# Patient Record
Sex: MALE | Race: WHITE | NOT HISPANIC OR LATINO | Employment: OTHER | URBAN - METROPOLITAN AREA
[De-identification: names, ages, dates, MRNs, and addresses within clinical notes are randomized per-mention and may not be internally consistent; named-entity substitution may affect disease eponyms.]

---

## 2017-01-10 RX ORDER — AMLODIPINE BESYLATE 10 MG/1
TABLET ORAL
Qty: 90 TABLET | Refills: 1 | Status: SHIPPED | OUTPATIENT
Start: 2017-01-10 | End: 2017-05-30 | Stop reason: SDUPTHER

## 2017-01-24 RX ORDER — VALSARTAN 320 MG/1
TABLET ORAL
Qty: 90 TABLET | Refills: 1 | Status: SHIPPED | OUTPATIENT
Start: 2017-01-24 | End: 2017-06-13 | Stop reason: SDUPTHER

## 2017-02-13 ENCOUNTER — LAB VISIT (OUTPATIENT)
Dept: LAB | Facility: HOSPITAL | Age: 77
End: 2017-02-13
Attending: FAMILY MEDICINE
Payer: MEDICARE

## 2017-02-13 DIAGNOSIS — E78.5 HYPERLIPIDEMIA LDL GOAL <100: ICD-10-CM

## 2017-02-13 DIAGNOSIS — I10 ESSENTIAL HYPERTENSION: ICD-10-CM

## 2017-02-13 DIAGNOSIS — E11.49 TYPE 2 DIABETES MELLITUS WITH NEUROLOGICAL MANIFESTATIONS: ICD-10-CM

## 2017-02-13 LAB
ALBUMIN SERPL BCP-MCNC: 4.1 G/DL
ALP SERPL-CCNC: 60 U/L
ALT SERPL W/O P-5'-P-CCNC: 15 U/L
ANION GAP SERPL CALC-SCNC: 10 MMOL/L
AST SERPL-CCNC: 18 U/L
BILIRUB SERPL-MCNC: 0.9 MG/DL
BUN SERPL-MCNC: 19 MG/DL
CALCIUM SERPL-MCNC: 9.6 MG/DL
CHLORIDE SERPL-SCNC: 105 MMOL/L
CO2 SERPL-SCNC: 24 MMOL/L
CREAT SERPL-MCNC: 1.1 MG/DL
EST. GFR  (AFRICAN AMERICAN): >60 ML/MIN/1.73 M^2
EST. GFR  (NON AFRICAN AMERICAN): >60 ML/MIN/1.73 M^2
GLUCOSE SERPL-MCNC: 88 MG/DL
POTASSIUM SERPL-SCNC: 4.4 MMOL/L
PROT SERPL-MCNC: 6.9 G/DL
SODIUM SERPL-SCNC: 139 MMOL/L

## 2017-02-13 PROCEDURE — 36415 COLL VENOUS BLD VENIPUNCTURE: CPT | Mod: PO

## 2017-02-13 PROCEDURE — 80053 COMPREHEN METABOLIC PANEL: CPT

## 2017-02-13 PROCEDURE — 83036 HEMOGLOBIN GLYCOSYLATED A1C: CPT

## 2017-02-14 LAB
ESTIMATED AVG GLUCOSE: 114 MG/DL
HBA1C MFR BLD HPLC: 5.6 %

## 2017-02-16 ENCOUNTER — OFFICE VISIT (OUTPATIENT)
Dept: FAMILY MEDICINE | Facility: CLINIC | Age: 77
End: 2017-02-16
Payer: MEDICARE

## 2017-02-16 VITALS
BODY MASS INDEX: 23.51 KG/M2 | SYSTOLIC BLOOD PRESSURE: 132 MMHG | OXYGEN SATURATION: 96 % | HEART RATE: 52 BPM | HEIGHT: 74 IN | RESPIRATION RATE: 18 BRPM | WEIGHT: 183.19 LBS | DIASTOLIC BLOOD PRESSURE: 64 MMHG

## 2017-02-16 DIAGNOSIS — M10.9 GOUT OF FOOT, UNSPECIFIED CAUSE, UNSPECIFIED CHRONICITY, UNSPECIFIED LATERALITY: ICD-10-CM

## 2017-02-16 DIAGNOSIS — E78.5 HYPERLIPIDEMIA LDL GOAL <100: ICD-10-CM

## 2017-02-16 DIAGNOSIS — E11.49 TYPE 2 DIABETES MELLITUS WITH NEUROLOGICAL MANIFESTATIONS: Primary | ICD-10-CM

## 2017-02-16 DIAGNOSIS — I10 ESSENTIAL HYPERTENSION: ICD-10-CM

## 2017-02-16 DIAGNOSIS — K21.9 GASTROESOPHAGEAL REFLUX DISEASE, ESOPHAGITIS PRESENCE NOT SPECIFIED: ICD-10-CM

## 2017-02-16 DIAGNOSIS — Z12.5 ENCOUNTER FOR SCREENING FOR MALIGNANT NEOPLASM OF PROSTATE: ICD-10-CM

## 2017-02-16 PROCEDURE — 1159F MED LIST DOCD IN RCRD: CPT | Mod: S$GLB,,, | Performed by: FAMILY MEDICINE

## 2017-02-16 PROCEDURE — 4010F ACE/ARB THERAPY RXD/TAKEN: CPT | Mod: S$GLB,,, | Performed by: FAMILY MEDICINE

## 2017-02-16 PROCEDURE — 3044F HG A1C LEVEL LT 7.0%: CPT | Mod: S$GLB,,, | Performed by: FAMILY MEDICINE

## 2017-02-16 PROCEDURE — 99499 UNLISTED E&M SERVICE: CPT | Mod: S$GLB,,, | Performed by: FAMILY MEDICINE

## 2017-02-16 PROCEDURE — 1126F AMNT PAIN NOTED NONE PRSNT: CPT | Mod: S$GLB,,, | Performed by: FAMILY MEDICINE

## 2017-02-16 PROCEDURE — 99999 PR PBB SHADOW E&M-EST. PATIENT-LVL III: CPT | Mod: PBBFAC,,, | Performed by: FAMILY MEDICINE

## 2017-02-16 PROCEDURE — 1157F ADVNC CARE PLAN IN RCRD: CPT | Mod: S$GLB,,, | Performed by: FAMILY MEDICINE

## 2017-02-16 PROCEDURE — 3075F SYST BP GE 130 - 139MM HG: CPT | Mod: S$GLB,,, | Performed by: FAMILY MEDICINE

## 2017-02-16 PROCEDURE — 99214 OFFICE O/P EST MOD 30 MIN: CPT | Mod: S$GLB,,, | Performed by: FAMILY MEDICINE

## 2017-02-16 PROCEDURE — 3078F DIAST BP <80 MM HG: CPT | Mod: S$GLB,,, | Performed by: FAMILY MEDICINE

## 2017-02-16 PROCEDURE — 1160F RVW MEDS BY RX/DR IN RCRD: CPT | Mod: S$GLB,,, | Performed by: FAMILY MEDICINE

## 2017-02-16 RX ORDER — METFORMIN HYDROCHLORIDE 500 MG/1
500 TABLET ORAL 2 TIMES DAILY WITH MEALS
Qty: 180 TABLET | Refills: 3
Start: 2017-02-16 | End: 2017-08-28 | Stop reason: SDUPTHER

## 2017-02-16 NOTE — MR AVS SNAPSHOT
[facility]  [address] [facility]  [place] [address]  Phone: [phone]  Fax: [phone]                  Carolann Waite   2017 9:00 AM   Office Visit    Description:  Male : 1940   Provider:  LENNY Wells MD   Department:  [facility]           Reason for Visit     Follow-up           Diagnoses this Visit        Comments    Type 2 diabetes mellitus with neurological manifestations    -  Primary     Essential hypertension         Hyperlipidemia LDL goal <100         Gastroesophageal reflux disease, esophagitis presence not specified         Gout of foot, unspecified cause, unspecified chronicity, unspecified laterality         Encounter for screening for malignant neoplasm of prostate                To Do List           Future Appointments        Provider Department Dept Phone    2017 8:00 AM LAB, [place] [facility] [phone]    2017 10:00 AM LAB, [place] [facility] [phone]    2017 9:00 AM LENNY Wells MD [facility] [phone]      Goals (5 Years of Data)     None      Follow-Up and Disposition     Return in about 3 months (around 2017), or if symptoms worsen or fail to improve.    Follow-up and Disposition History       These Medications        Disp Refills Start End    metformin (GLUCOPHAGE) 500 MG tablet 180 tablet 3 2017     Take 1 tablet (500 mg total) by mouth 2 (two) times daily with meals. - Oral    Pharmacy: [facility] - [address] #: [phone]         [facility]     [facility] Nurse Care Line -  Assistance  Registered nurses in the [facility] provide clinical advisement, health education, appointment booking, and other advisory services.  Call for this free service at [phone].             Medications           Message regarding Medications     Verify the  changes and/or additions to your medication regime listed below are the same as discussed with your clinician today.  If any of these changes or additions are incorrect, please notify your healthcare provider.        CHANGE how you are taking these medications     Start Taking Instead of    metformin (GLUCOPHAGE) 500 MG tablet metformin (GLUCOPHAGE) 500 MG tablet    Dosage:  Take 1 tablet (500 mg total) by mouth 2 (two) times daily with meals. Dosage:  1000 mg qam and 500 mg qpm    Reason for Change:  Reorder            Verify that the below list of medications is an accurate representation of the medications you are currently taking.  If none reported, the list may be blank. If incorrect, please contact your healthcare provider. Carry this list with you in case of emergency.           Current Medications     ACCU-CHEK MILENA PLUS METER Misc USE AS DIRECTED    ACCU-CHEK MILENA PLUS TEST STRP Strp USE TO CHECK BLOOD SUGAR DAILY    acyclovir (ZOVIRAX) 400 MG tablet     amlodipine (NORVASC) 10 MG tablet TAKE 1 TABLET EVERY DAY    atorvastatin (LIPITOR) 20 MG tablet TAKE 1 TABLET ONE TIME DAILY    etodolac (LODINE) 200 MG Cap Take 1 capsule (200 mg total) by mouth every 8 (eight) hours as needed. To use for gout attack related pain instead of indocin.    fluticasone (FLONASE) 50 mcg/actuation nasal spray 2 sprays by Nasal route daily as needed. 1 - 2 Spray(s) Nasal daily.    metformin (GLUCOPHAGE) 500 MG tablet Take 1 tablet (500 mg total) by mouth 2 (two) times daily with meals.    pantoprazole (PROTONIX) 40 MG tablet TAKE 1 TABLET ONE TIME DAILY    valsartan (DIOVAN) 320 MG tablet TAKE 1 TABLET ONE TIME DAILY    fexofenadine (ALLEGRA) 180 MG tablet Take 1 tablet by mouth daily as needed.     triamcinolone acetonide 0.1% (KENALOG) 0.1 % cream            Clinical Reference Information           Your Vitals Were     BP Pulse Resp Height Weight SpO2    132/64 (BP Location: Left arm, Patient Position: Sitting, BP Method:  "Manual) 52 18 6' 2" (1.88 m) 83.1 kg (183 lb 3.2 oz) 96%    BMI                23.52 kg/m2          Blood Pressure          Most Recent Value    BP  132/64      Allergies as of 2/16/2017     No Known Drug Allergies      Immunizations Administered on Date of Encounter - 2/16/2017     None      Orders Placed During Today's Visit     Future Labs/Procedures Expected by Expires    CBC auto differential  2/16/2017 8/15/2017    Comprehensive metabolic panel  2/16/2017 8/15/2017    Hemoglobin A1c  2/16/2017 8/15/2017    Lipid panel  2/16/2017 8/15/2017    Microalbumin/creatinine urine ratio  2/16/2017 8/15/2017    PSA, Screening  2/16/2017 8/15/2017    TSH  2/16/2017 8/15/2017    Uric acid  2/16/2017 4/17/2018      Smoking Cessation     If you would like to quit smoking:   You may be eligible for free services if you are a [place] resident and started smoking cigarettes before September 1, 1988.  Call the Smoking Cessation [employer] ([facility]) toll free at ([phone] or [phone] 547-7845.   Call 4-825-QUIT-NOW if you do not meet the above criteria.            Language Assistance Services     ATTENTION: Language assistance services are available, free of charge. Please call [phone].      ATENCIÓN: Si habla español, tiene a curtis disposición servicios gratuitos de asistencia lingüística. Llame al [phone].     CHÚ Ý: N?u b?n nói Ti?ng Vi?t, có các d?ch v? h? tr? ngôn ng? mi?n phí dành cho b?n. G?i s? [phone].         [facility] complies with applicable Federal civil rights laws and does not discriminate on the basis of race, color, national origin, age, disability, or sex.        "

## 2017-02-16 NOTE — PROGRESS NOTES
Subjective:       Patient ID: Carolann Waite is a 76 y.o. male.    Chief Complaint: Follow-up (3 month f/u)    HPI Comments: Here for f/u chronic medical issues.  States doing well overall.    Hypertension   This is a chronic problem. The current episode started more than 1 year ago. The problem is controlled. Pertinent negatives include no chest pain, palpitations or shortness of breath. Past treatments include angiotensin blockers. The current treatment provides moderate improvement. There are no compliance problems.    Hyperlipidemia   This is a chronic problem. The current episode started more than 1 year ago. The problem is controlled. Recent lipid tests were reviewed and are normal. Pertinent negatives include no chest pain or shortness of breath. Current antihyperlipidemic treatment includes statins. The current treatment provides moderate improvement of lipids. There are no compliance problems.    Diabetes   He presents for his follow-up diabetic visit. He has type 2 diabetes mellitus. His disease course has been stable. Pertinent negatives for hypoglycemia include no nervousness/anxiousness. Pertinent negatives for diabetes include no chest pain and no fatigue. Current diabetic treatment includes oral agent (monotherapy). He is compliant with treatment all of the time. An ACE inhibitor/angiotensin II receptor blocker is being taken.     Review of Systems   Constitutional: Negative for chills, fatigue and fever.   Respiratory: Negative for cough, chest tightness and shortness of breath.    Cardiovascular: Negative for chest pain, palpitations and leg swelling.   Gastrointestinal: Negative for abdominal distention and abdominal pain.   Endocrine: Negative for cold intolerance and heat intolerance.   Skin: Negative for rash and wound.   Psychiatric/Behavioral: Negative for dysphoric mood. The patient is not nervous/anxious.        Objective:      Physical Exam   Constitutional: He appears well-developed and  well-nourished.   HENT:   Head: Normocephalic and atraumatic.   Cardiovascular: Normal rate, regular rhythm and normal heart sounds.    Pulmonary/Chest: Effort normal and breath sounds normal.   Abdominal: Soft. Bowel sounds are normal.   Musculoskeletal: Normal range of motion.   Psychiatric: He has a normal mood and affect.   Nursing note and vitals reviewed.      Assessment:       1. Type 2 diabetes mellitus with neurological manifestations    2. Essential hypertension    3. Hyperlipidemia LDL goal <100    4. Gastroesophageal reflux disease, esophagitis presence not specified    5. Gout of foot, unspecified cause, unspecified chronicity, unspecified laterality    6. Encounter for screening for malignant neoplasm of prostate         Plan:       Type 2 diabetes mellitus with neurological manifestations  -     CBC auto differential; Future; Expected date: 2/16/17  -     Comprehensive metabolic panel; Future; Expected date: 2/16/17  -     Hemoglobin A1c; Future; Expected date: 2/16/17  -     Lipid panel; Future; Expected date: 2/16/17  -     Microalbumin/creatinine urine ratio; Future; Expected date: 2/16/17  -     PSA, Screening; Future; Expected date: 2/16/17  -     TSH; Future; Expected date: 2/16/17  -     Uric acid; Future; Expected date: 2/16/17    Essential hypertension  -     CBC auto differential; Future; Expected date: 2/16/17  -     Comprehensive metabolic panel; Future; Expected date: 2/16/17  -     Hemoglobin A1c; Future; Expected date: 2/16/17  -     Lipid panel; Future; Expected date: 2/16/17  -     Microalbumin/creatinine urine ratio; Future; Expected date: 2/16/17  -     PSA, Screening; Future; Expected date: 2/16/17  -     TSH; Future; Expected date: 2/16/17  -     Uric acid; Future; Expected date: 2/16/17    Hyperlipidemia LDL goal <100  -     CBC auto differential; Future; Expected date: 2/16/17  -     Comprehensive metabolic panel; Future; Expected date: 2/16/17  -     Hemoglobin A1c; Future;  Expected date: 2/16/17  -     Lipid panel; Future; Expected date: 2/16/17  -     Microalbumin/creatinine urine ratio; Future; Expected date: 2/16/17  -     PSA, Screening; Future; Expected date: 2/16/17  -     TSH; Future; Expected date: 2/16/17  -     Uric acid; Future; Expected date: 2/16/17    Gastroesophageal reflux disease, esophagitis presence not specified  -     CBC auto differential; Future; Expected date: 2/16/17  -     Comprehensive metabolic panel; Future; Expected date: 2/16/17  -     Hemoglobin A1c; Future; Expected date: 2/16/17  -     Lipid panel; Future; Expected date: 2/16/17  -     Microalbumin/creatinine urine ratio; Future; Expected date: 2/16/17  -     PSA, Screening; Future; Expected date: 2/16/17  -     TSH; Future; Expected date: 2/16/17  -     Uric acid; Future; Expected date: 2/16/17    Gout of foot, unspecified cause, unspecified chronicity, unspecified laterality  -     CBC auto differential; Future; Expected date: 2/16/17  -     Comprehensive metabolic panel; Future; Expected date: 2/16/17  -     Hemoglobin A1c; Future; Expected date: 2/16/17  -     Lipid panel; Future; Expected date: 2/16/17  -     Microalbumin/creatinine urine ratio; Future; Expected date: 2/16/17  -     PSA, Screening; Future; Expected date: 2/16/17  -     TSH; Future; Expected date: 2/16/17  -     Uric acid; Future; Expected date: 2/16/17    Encounter for screening for malignant neoplasm of prostate   -     PSA, Screening; Future; Expected date: 2/16/17    Other orders  -     metformin (GLUCOPHAGE) 500 MG tablet; Take 1 tablet (500 mg total) by mouth 2 (two) times daily with meals.  Dispense: 180 tablet; Refill: 3    Will monitor chronic medical issues and continue current plan of care.  hga1c and lipid at goal  Eye and foot exam utd    Return in about 3 months (around 5/16/2017), or if symptoms worsen or fail to improve.

## 2017-05-09 ENCOUNTER — TELEPHONE (OUTPATIENT)
Dept: FAMILY MEDICINE | Facility: CLINIC | Age: 77
End: 2017-05-09

## 2017-05-09 NOTE — TELEPHONE ENCOUNTER
----- Message from Callie Smith sent at 5/9/2017 12:40 PM CDT -----  Contact: pt   Patient called and asked if you will be able to see him today for his allergies I offered other location he wants  To be seen in [place] only. [phone]

## 2017-05-16 RX ORDER — ATORVASTATIN CALCIUM 20 MG/1
TABLET, FILM COATED ORAL
Qty: 90 TABLET | Refills: 1 | Status: SHIPPED | OUTPATIENT
Start: 2017-05-16 | End: 2017-10-02 | Stop reason: SDUPTHER

## 2017-05-17 ENCOUNTER — LAB VISIT (OUTPATIENT)
Dept: LAB | Facility: HOSPITAL | Age: 77
End: 2017-05-17
Attending: FAMILY MEDICINE
Payer: MEDICARE

## 2017-05-17 DIAGNOSIS — E11.49 TYPE 2 DIABETES MELLITUS WITH NEUROLOGICAL MANIFESTATIONS: ICD-10-CM

## 2017-05-17 DIAGNOSIS — K21.9 GASTROESOPHAGEAL REFLUX DISEASE, ESOPHAGITIS PRESENCE NOT SPECIFIED: ICD-10-CM

## 2017-05-17 DIAGNOSIS — E78.5 HYPERLIPIDEMIA LDL GOAL <100: ICD-10-CM

## 2017-05-17 DIAGNOSIS — Z12.5 ENCOUNTER FOR SCREENING FOR MALIGNANT NEOPLASM OF PROSTATE: ICD-10-CM

## 2017-05-17 DIAGNOSIS — I10 ESSENTIAL HYPERTENSION: ICD-10-CM

## 2017-05-17 DIAGNOSIS — M10.9 GOUT OF FOOT, UNSPECIFIED CAUSE, UNSPECIFIED CHRONICITY, UNSPECIFIED LATERALITY: ICD-10-CM

## 2017-05-17 LAB
ALBUMIN SERPL BCP-MCNC: 4.2 G/DL
ALP SERPL-CCNC: 57 U/L
ALT SERPL W/O P-5'-P-CCNC: 12 U/L
ANION GAP SERPL CALC-SCNC: 8 MMOL/L
AST SERPL-CCNC: 16 U/L
BASOPHILS # BLD AUTO: 0.04 K/UL
BASOPHILS NFR BLD: 0.6 %
BILIRUB SERPL-MCNC: 0.9 MG/DL
BUN SERPL-MCNC: 20 MG/DL
CALCIUM SERPL-MCNC: 9.6 MG/DL
CHLORIDE SERPL-SCNC: 105 MMOL/L
CHOLEST/HDLC SERPL: 3.1 {RATIO}
CO2 SERPL-SCNC: 27 MMOL/L
COMPLEXED PSA SERPL-MCNC: 1.6 NG/ML
CREAT SERPL-MCNC: 1.4 MG/DL
DIFFERENTIAL METHOD: ABNORMAL
EOSINOPHIL # BLD AUTO: 0.1 K/UL
EOSINOPHIL NFR BLD: 0.9 %
ERYTHROCYTE [DISTWIDTH] IN BLOOD BY AUTOMATED COUNT: 12.9 %
EST. GFR  (AFRICAN AMERICAN): 56.8 ML/MIN/1.73 M^2
EST. GFR  (NON AFRICAN AMERICAN): 49.1 ML/MIN/1.73 M^2
GLUCOSE SERPL-MCNC: 120 MG/DL
HCT VFR BLD AUTO: 45 %
HDL/CHOLESTEROL RATIO: 32.4 %
HDLC SERPL-MCNC: 182 MG/DL
HDLC SERPL-MCNC: 59 MG/DL
HGB BLD-MCNC: 15.9 G/DL
LDLC SERPL CALC-MCNC: 98.2 MG/DL
LYMPHOCYTES # BLD AUTO: 2.4 K/UL
LYMPHOCYTES NFR BLD: 33.7 %
MCH RBC QN AUTO: 31.5 PG
MCHC RBC AUTO-ENTMCNC: 35.3 %
MCV RBC AUTO: 89 FL
MONOCYTES # BLD AUTO: 0.9 K/UL
MONOCYTES NFR BLD: 13.5 %
NEUTROPHILS # BLD AUTO: 3.6 K/UL
NEUTROPHILS NFR BLD: 51 %
NONHDLC SERPL-MCNC: 123 MG/DL
PLATELET # BLD AUTO: 165 K/UL
PMV BLD AUTO: 10.6 FL
POTASSIUM SERPL-SCNC: 4.2 MMOL/L
PROT SERPL-MCNC: 7.1 G/DL
RBC # BLD AUTO: 5.04 M/UL
SODIUM SERPL-SCNC: 140 MMOL/L
TRIGL SERPL-MCNC: 124 MG/DL
TSH SERPL DL<=0.005 MIU/L-ACNC: 1.82 UIU/ML
URATE SERPL-MCNC: 6.6 MG/DL
WBC # BLD AUTO: 6.97 K/UL

## 2017-05-17 PROCEDURE — 84550 ASSAY OF BLOOD/URIC ACID: CPT

## 2017-05-17 PROCEDURE — 85025 COMPLETE CBC W/AUTO DIFF WBC: CPT

## 2017-05-17 PROCEDURE — 83036 HEMOGLOBIN GLYCOSYLATED A1C: CPT

## 2017-05-17 PROCEDURE — 36415 COLL VENOUS BLD VENIPUNCTURE: CPT | Mod: PO

## 2017-05-17 PROCEDURE — 84153 ASSAY OF PSA TOTAL: CPT

## 2017-05-17 PROCEDURE — 84443 ASSAY THYROID STIM HORMONE: CPT

## 2017-05-17 PROCEDURE — 80053 COMPREHEN METABOLIC PANEL: CPT

## 2017-05-17 PROCEDURE — 80061 LIPID PANEL: CPT

## 2017-05-18 LAB
ESTIMATED AVG GLUCOSE: 126 MG/DL
HBA1C MFR BLD HPLC: 6 %

## 2017-05-24 ENCOUNTER — OFFICE VISIT (OUTPATIENT)
Dept: FAMILY MEDICINE | Facility: CLINIC | Age: 77
End: 2017-05-24
Payer: MEDICARE

## 2017-05-24 VITALS
HEART RATE: 52 BPM | WEIGHT: 181 LBS | DIASTOLIC BLOOD PRESSURE: 64 MMHG | BODY MASS INDEX: 23.23 KG/M2 | HEIGHT: 74 IN | SYSTOLIC BLOOD PRESSURE: 128 MMHG

## 2017-05-24 DIAGNOSIS — I10 ESSENTIAL HYPERTENSION: ICD-10-CM

## 2017-05-24 DIAGNOSIS — E11.49 TYPE 2 DIABETES MELLITUS WITH NEUROLOGICAL MANIFESTATIONS: Primary | ICD-10-CM

## 2017-05-24 DIAGNOSIS — M10.9 GOUT OF FOOT, UNSPECIFIED CAUSE, UNSPECIFIED CHRONICITY, UNSPECIFIED LATERALITY: ICD-10-CM

## 2017-05-24 DIAGNOSIS — E78.5 HYPERLIPIDEMIA LDL GOAL <100: ICD-10-CM

## 2017-05-24 PROCEDURE — 3044F HG A1C LEVEL LT 7.0%: CPT | Mod: S$GLB,,, | Performed by: FAMILY MEDICINE

## 2017-05-24 PROCEDURE — 99499 UNLISTED E&M SERVICE: CPT | Mod: S$GLB,,, | Performed by: FAMILY MEDICINE

## 2017-05-24 PROCEDURE — 1159F MED LIST DOCD IN RCRD: CPT | Mod: S$GLB,,, | Performed by: FAMILY MEDICINE

## 2017-05-24 PROCEDURE — 99999 PR PBB SHADOW E&M-EST. PATIENT-LVL III: CPT | Mod: PBBFAC,,, | Performed by: FAMILY MEDICINE

## 2017-05-24 PROCEDURE — 1126F AMNT PAIN NOTED NONE PRSNT: CPT | Mod: S$GLB,,, | Performed by: FAMILY MEDICINE

## 2017-05-24 PROCEDURE — 1160F RVW MEDS BY RX/DR IN RCRD: CPT | Mod: S$GLB,,, | Performed by: FAMILY MEDICINE

## 2017-05-24 PROCEDURE — 4010F ACE/ARB THERAPY RXD/TAKEN: CPT | Mod: S$GLB,,, | Performed by: FAMILY MEDICINE

## 2017-05-24 PROCEDURE — 99214 OFFICE O/P EST MOD 30 MIN: CPT | Mod: S$GLB,,, | Performed by: FAMILY MEDICINE

## 2017-05-24 PROCEDURE — 3078F DIAST BP <80 MM HG: CPT | Mod: S$GLB,,, | Performed by: FAMILY MEDICINE

## 2017-05-24 PROCEDURE — 3074F SYST BP LT 130 MM HG: CPT | Mod: S$GLB,,, | Performed by: FAMILY MEDICINE

## 2017-05-24 RX ORDER — AZELASTINE HYDROCHLORIDE 0.5 MG/ML
SOLUTION/ DROPS OPHTHALMIC
Status: ON HOLD | COMMUNITY
Start: 2017-05-09 | End: 2018-08-15

## 2017-05-24 RX ORDER — LANCETS
EACH MISCELLANEOUS
COMMUNITY
Start: 2017-04-17 | End: 2022-03-09 | Stop reason: CLARIF

## 2017-05-24 NOTE — PROGRESS NOTES
Subjective:       Patient ID: Carolann Waite is a 76 y.o. male.    Chief Complaint: 3 Month Follow-up, Daibetes MEllitus    Here for f/u chronic medical issues.  States doing well.  Had recent labs.  Home bp running 130s/70s.      Hypertension   This is a chronic problem. The current episode started more than 1 year ago. The problem is controlled. Pertinent negatives include no chest pain, palpitations or shortness of breath. Past treatments include angiotensin blockers and calcium channel blockers.   Diabetes   He presents for his follow-up diabetic visit. He has type 2 diabetes mellitus. His disease course has been stable. Pertinent negatives for hypoglycemia include no nervousness/anxiousness. Pertinent negatives for diabetes include no chest pain and no fatigue. An ACE inhibitor/angiotensin II receptor blocker is being taken.   Hyperlipidemia   This is a chronic problem. The current episode started more than 1 year ago. The problem is controlled. Recent lipid tests were reviewed and are normal. Pertinent negatives include no chest pain or shortness of breath. Current antihyperlipidemic treatment includes statins.     Review of Systems   Constitutional: Negative for chills, fatigue and fever.   Respiratory: Negative for cough, chest tightness and shortness of breath.    Cardiovascular: Negative for chest pain, palpitations and leg swelling.   Gastrointestinal: Negative for abdominal distention and abdominal pain.   Endocrine: Negative for cold intolerance and heat intolerance.   Skin: Negative for rash.   Psychiatric/Behavioral: Negative for dysphoric mood. The patient is not nervous/anxious.        Objective:      Physical Exam   Constitutional: He appears well-developed and well-nourished.   HENT:   Head: Normocephalic and atraumatic.   Cardiovascular: Normal rate, regular rhythm and normal heart sounds.    Pulmonary/Chest: Effort normal and breath sounds normal.   Abdominal: Soft. Bowel sounds are normal.    Psychiatric: He has a normal mood and affect.   Nursing note and vitals reviewed.      Assessment:       1. Type 2 diabetes mellitus with neurological manifestations    2. Hyperlipidemia LDL goal <100    3. Essential hypertension    4. Gout of foot, unspecified cause, unspecified chronicity, unspecified laterality        Plan:       Type 2 diabetes mellitus with neurological manifestations  -     Ambulatory referral to Optometry  -     CBC auto differential; Future; Expected date: 05/24/2017  -     Comprehensive metabolic panel; Future; Expected date: 05/24/2017  -     Hemoglobin A1c; Future; Expected date: 05/24/2017  -     TSH; Future; Expected date: 05/24/2017  -     Microalbumin/creatinine urine ratio; Future; Expected date: 05/24/2017    Hyperlipidemia LDL goal <100  -     CBC auto differential; Future; Expected date: 05/24/2017  -     Comprehensive metabolic panel; Future; Expected date: 05/24/2017  -     Hemoglobin A1c; Future; Expected date: 05/24/2017  -     TSH; Future; Expected date: 05/24/2017  -     Microalbumin/creatinine urine ratio; Future; Expected date: 05/24/2017    Essential hypertension  -     CBC auto differential; Future; Expected date: 05/24/2017  -     Comprehensive metabolic panel; Future; Expected date: 05/24/2017  -     Hemoglobin A1c; Future; Expected date: 05/24/2017  -     TSH; Future; Expected date: 05/24/2017  -     Microalbumin/creatinine urine ratio; Future; Expected date: 05/24/2017    Gout of foot, unspecified cause, unspecified chronicity, unspecified laterality  -     Uric acid; Future; Expected date: 05/24/2017      Labs look great.  Will monitor chronic medical issues and continue current plan of care.    Return in about 6 months (around 11/24/2017), or if symptoms worsen or fail to improve.

## 2017-05-31 RX ORDER — AMLODIPINE BESYLATE 10 MG/1
TABLET ORAL
Qty: 90 TABLET | Refills: 1 | Status: SHIPPED | OUTPATIENT
Start: 2017-05-31 | End: 2017-10-16 | Stop reason: SDUPTHER

## 2017-06-06 ENCOUNTER — TELEPHONE (OUTPATIENT)
Dept: FAMILY MEDICINE | Facility: CLINIC | Age: 77
End: 2017-06-06

## 2017-06-06 NOTE — TELEPHONE ENCOUNTER
----- Message from Indira Barclay sent at 6/5/2017  2:48 PM CDT -----  Contact:  call  [phone]    Calling to  Speak to the  Nurse // please  Call  For  details

## 2017-06-07 ENCOUNTER — OFFICE VISIT (OUTPATIENT)
Dept: PODIATRY | Facility: CLINIC | Age: 77
End: 2017-06-07
Payer: MEDICARE

## 2017-06-07 VITALS — HEIGHT: 74 IN | BODY MASS INDEX: 23.25 KG/M2 | WEIGHT: 181.19 LBS

## 2017-06-07 DIAGNOSIS — M20.5X9 HALLUX LIMITUS, UNSPECIFIED LATERALITY: ICD-10-CM

## 2017-06-07 DIAGNOSIS — L60.2 ONYCHAUXIS: ICD-10-CM

## 2017-06-07 DIAGNOSIS — M21.6X9 CAVUS DEFORMITY, UNSPECIFIED LATERALITY: ICD-10-CM

## 2017-06-07 DIAGNOSIS — M19.90 ARTHRITIS: ICD-10-CM

## 2017-06-07 DIAGNOSIS — M20.10 HALLUX ABDUCTO VALGUS, UNSPECIFIED LATERALITY: ICD-10-CM

## 2017-06-07 DIAGNOSIS — E08.42 DIABETIC POLYNEUROPATHY ASSOCIATED WITH DIABETES MELLITUS DUE TO UNDERLYING CONDITION: Primary | ICD-10-CM

## 2017-06-07 PROCEDURE — 99213 OFFICE O/P EST LOW 20 MIN: CPT | Mod: S$GLB,,,

## 2017-06-07 PROCEDURE — 3044F HG A1C LEVEL LT 7.0%: CPT | Mod: S$GLB,,,

## 2017-06-07 PROCEDURE — 99999 PR PBB SHADOW E&M-EST. PATIENT-LVL III: CPT | Mod: PBBFAC,,,

## 2017-06-07 PROCEDURE — 99499 UNLISTED E&M SERVICE: CPT | Mod: S$GLB,,,

## 2017-06-07 PROCEDURE — 1126F AMNT PAIN NOTED NONE PRSNT: CPT | Mod: S$GLB,,,

## 2017-06-07 NOTE — PROGRESS NOTES
Chief Complaint   Patient presents with    Diabetic Foot Exam     HgbA1c: 6.0 5/17/17 PCP: [facility] 5/24/17       Referred by Dr. Wells  History of present illness: Patient presents to the clinic for at risk diabetic foot care.   Patient is reporting thick and yellow discolored painful toenails and burning tingling and numbness to the extremities.  Patient does not wear diabetic shoes.    Review of Systems:   Vascular : negative rest pain, claudication, bruising   Musculoskeletal:+ for joint pain   Skin: negative for rashes, open wounds and lesions, + for thickened, painful and discolored toenails  Neurological: + for burning, tingling, paresthesia and numbness   All other unremarkable.    Past Medical, Family and Social History reviewed.    Constitutional:   Patient is oriented to person, place, and time. Vital signs are normal. Appears well-developed and well-nourished.     Vascular:   Dorsalis pedis pulses are 2+ on the right side, and 2+ on the left side.   Posterior tibial pulses are 2+ on the right side, and 2+ on the left side.   - digital hair growth, capillary fill time to all toes <3 seconds, toes are cool to touch  + swelling feet and ankles    Skin/Dermatological:   Skin is thin, warm, shiny and atrophic. No cyanosis or clubbing. No rashes noted. No open wounds.   Nails yellow discolored, thickened 3 mm, elongated 3 mm with subungual debris and tenderness    Musculoskeletal:   Multiple digitial and forefoot spurring.  Mild bunions, hammertoes and bunionettes observed.  Decreased range of motion of bilateral midtarsal, subtalar joints, ankle joint dorsiflexion is restricted bilaterally. Muscle strength to tibialis anterior, extensor hallucis longus, extensor digitorum longus, peroneal muscles, flexor hallucis/digotorum longus, posterior tibial and gastrosoleal complex is 5/5.    Neurological:   + deficits to sharp/dull, light touch or vibratory sensation bilateral feet     Assessment/Plan:   #1  Diabetes, neuropathy, foot deformity: Discussed diabetic footcare, daily foot inspections, tight blood sugar control and proper shoe gear.   #2 Onychomycosis/onychocryptosis:  Discussed etiology of the problem and treatment options, including topical, oral and laser therapy.  With patient permission the toenails were debrided to the base with nail nipper.  Patient tolerated well.  Patient will continue Kerydin 5% solution topically at night for up to a year.  Keep feet clean and dry.  Avoid cotton socks.  Lysol to this shoes.    RTC 6 months.

## 2017-06-14 RX ORDER — VALSARTAN 320 MG/1
TABLET ORAL
Qty: 90 TABLET | Refills: 1 | Status: SHIPPED | OUTPATIENT
Start: 2017-06-14 | End: 2017-11-01 | Stop reason: SDUPTHER

## 2017-06-28 ENCOUNTER — OFFICE VISIT (OUTPATIENT)
Dept: OPTOMETRY | Facility: CLINIC | Age: 77
End: 2017-06-28
Payer: MEDICARE

## 2017-06-28 DIAGNOSIS — Z13.5 GLAUCOMA SCREENING: ICD-10-CM

## 2017-06-28 DIAGNOSIS — H01.00B BLEPHARITIS OF UPPER AND LOWER EYELIDS OF BOTH EYES, UNSPECIFIED TYPE: ICD-10-CM

## 2017-06-28 DIAGNOSIS — E11.9 DIABETES MELLITUS TYPE 2 WITHOUT RETINOPATHY: Primary | ICD-10-CM

## 2017-06-28 DIAGNOSIS — H43.812 POSTERIOR VITREOUS DETACHMENT, LEFT: ICD-10-CM

## 2017-06-28 DIAGNOSIS — H52.03 HYPEROPIA WITH ASTIGMATISM AND PRESBYOPIA, BILATERAL: ICD-10-CM

## 2017-06-28 DIAGNOSIS — H52.203 HYPEROPIA WITH ASTIGMATISM AND PRESBYOPIA, BILATERAL: ICD-10-CM

## 2017-06-28 DIAGNOSIS — H01.00A BLEPHARITIS OF UPPER AND LOWER EYELIDS OF BOTH EYES, UNSPECIFIED TYPE: ICD-10-CM

## 2017-06-28 DIAGNOSIS — H52.4 HYPEROPIA WITH ASTIGMATISM AND PRESBYOPIA, BILATERAL: ICD-10-CM

## 2017-06-28 DIAGNOSIS — H25.13 NUCLEAR SCLEROSIS, BILATERAL: ICD-10-CM

## 2017-06-28 PROCEDURE — 92015 DETERMINE REFRACTIVE STATE: CPT | Mod: S$GLB,,, | Performed by: OPTOMETRIST

## 2017-06-28 PROCEDURE — 99499 UNLISTED E&M SERVICE: CPT | Mod: S$GLB,,, | Performed by: OPTOMETRIST

## 2017-06-28 PROCEDURE — 92014 COMPRE OPH EXAM EST PT 1/>: CPT | Mod: S$GLB,,, | Performed by: OPTOMETRIST

## 2017-06-28 PROCEDURE — 99999 PR PBB SHADOW E&M-EST. PATIENT-LVL II: CPT | Mod: PBBFAC,,, | Performed by: OPTOMETRIST

## 2017-06-28 RX ORDER — BLOOD SUGAR DIAGNOSTIC
STRIP MISCELLANEOUS
Qty: 200 STRIP | Refills: 11 | Status: SHIPPED | OUTPATIENT
Start: 2017-06-28 | End: 2018-08-28 | Stop reason: SDUPTHER

## 2017-06-28 NOTE — LETTER
June 28, 2017      LENNY Wells MD  [address] [facility] [address] [place] [address]           [facility]  [address] [facility] [address] [place] [address]  Phone: [phone]  Fax: [phone]          Patient: Carolann Waite   MR Number: [id number]   YOB: 1940   Date of Visit: 6/28/2017       Dear Dr. LENNY Wells:    Thank you for referring Carolann Waite to me for evaluation. Attached you will find relevant portions of my assessment and plan of care.    If you have questions, please do not hesitate to call me. I look forward to following Carolann Waite along with you.    Sincerely,    MAGNOLIA Mcintosh, OD    Enclosure  CC:  No Recipients    If you would like to receive this communication electronically, please contact [email or web address] or [phone] to request more information on [facility] [employer] access.    For providers and/or their staff who would like to refer a patient to [facility], please contact us through our one-stop-shop provider referral line, [facility] , at [phone].    If you feel you have received this communication in error or would no longer like to receive these types of communications, please e-mail [email or web address]

## 2017-06-28 NOTE — PATIENT INSTRUCTIONS
BLEPHARITIS & TREATMENT   Definition  Blepharitis is an inflammation of the eyelid margin, which causes itchy, irritated, and often red eyes. One common cause is staphylococcus, skin bacteria, which can thrive in these conditions and can possibly cause eye damage such as corneal scarring.   Occurrence  Blepharitis is a very common problem seen particularly in people with a tendency toward oily skin, dandruff, or dry eyes. Though most frequently seen later in life, blepharitis can begin in childhood.  It can also be associated with hormonal changes (puberty, menopause), or changes in medications.  It is also common in patients with Rosacea.  Symptoms  Lid Crusting - The lids are often reddened, somewhat swollen and scaly appearing at the base of the lashes. These scales, as they become coarser, form crusts that cause the lids to stick together. This is especially prominent upon waking.   Itching - The inflammation of the lids will cause itching and irritation.   Tearing/ Light Sensitivity -The eyes may tear more frequently and may also be sensitive to bright light.  Treatment  Treatment is aimed at lowering the number of bacteria along the eyelid margin and decreasing the scales by lid hygiene and in some cases antibiotic/steroid medications. The goal is to control this problem and prevent it from becoming a more serious condition. Treatment is focused on keeping the condition under control by routine daily lid hygiene. Unfortunately, if the treatment is stopped the symptoms often recur.    1. Upon waking, place a clean, warm, wet, washcloth over your closed eyelids (upper and lower) for several minutes.  This may be accomplished by allowing warm shower water to run with eyes closed.  2. Place a small amount of diluted (1/4 strength) Baby Shampoo or a commercial lid cleaning solution on a cotton swab, washcloth, or your clean fingertip. Gently pull down on your lower lid and use a horizontal back and forth motion to  scrub the edge of your lid at the base of the eyelashes. Do not scrub the inside of the lid or the skin on the outside. Close the eye and use the cotton swab to scrub along the base of the upper lid lashes. Rinse the shampoo away with warm water.   3. Additionally your doctor may ask that you use an antibiotic/steroid drop or ointment for a few weeks. Use as directed.   Perform this procedure 2 times a day for the first 7 days and then cut back to once a day. (Or per your doctors recommendation.) You may need to continue lid scrubs on a daily basis, though some find they can successfully control their blepharitis symptoms with scrubs done 2 to 3 times a week.    Medication--use as directed if medication is prescribed    ________________________________________________________________________    FLASHES / FLOATERS / POSTERIOR VITREOUS DETACHMENT    Call the clinic if you have any further changes in symptoms.  Including:  Increased numbers of floaters or flashing lights, dimness or darkness that moves through or stays constant in your vision, or any pain in the eye (s).            Early Cataracts--not visually significant for surgery consultation.    What Are Cataracts?  A clear lens in the eye focuses light. This lets the eye see images sharply. With age, the lens slowly becomes cloudy. The cloudy lens is a cataract. A cataract scatters light and makes it hard for the eye to focus. Cataracts often form in both eyes. But one lens may cloud faster than the other.      The Aging of Your Lens    Your lens may cloud so slowly that you don`t notice any vision changes at first. But as the cataract gets worse, the eye has a harder time focusing. In early stages, glasses may help you see better. As the lens gets cloudier, your doctor may recommend surgery to restore your vision.  DRY EYES:  Use Over The Counter artificial tears as needed for dry eye symptoms.  Some common brands include:  Systane, Optive, and Refresh.  These  "drops can be used as frequently as desired, but may be most helpful use during long periods of concentrated work.  For example, reading / working at the computer.  Avoid drops that "get redness out", as these contain medication that may further irritate the eyes.    ALLERGY EYES / SYMPTOMS:    Over the counter medications include--Zaditor and Alaway  Use as directed 1-2 drops daily for symptoms of itching / watering eyes.  These drops will not help for dry eye or exposure symptoms.    DIABETES AND THE EYE / DIABETIC RETINOPATHY    Diabetic retinopathy is a condition occurring in persons with diabetes, which causes progressive damage to the retina, the light sensitive lining at the back of the eye. It is a serious sight-threatening complication of diabetes.    Diabetic retinopathy is the result of damage to the tiny blood vessels that nourish the retina. They leak blood and other fluids that cause swelling of retinal tissue and clouding of vision. The condition usually affects both eyes. The longer a person has diabetes, the more likely they will develop diabetic retinopathy. If left untreated, diabetic retinopathy can cause blindness.  There are two basic types of diabetic retinopathy:    Background or nonproliferative diabetic retinopathy (NPDR)  Nonproliferative diabetic retinopathy (NPDR) is the earliest stage of diabetic retinopathy. With this condition, damaged blood vessels in the retina begin to leak extra fluid and small amounts of blood into the eye. Sometimes, deposits of cholesterol or other fats from the blood may leak into the retina. Many people with diabetes have mild NPDR, which usually does not affect their vision. However, if their vision is affected, it is the result of macular edema and macular ischemia.    If vision is affected due to macular changes, a consult with a Retina Specialist may be advised.  This is an ophthalmologist that treats retina conditions, including diabetic retinopathy. "     Proliferative diabetic retinopathy (PDR)  Proliferative diabetic retinopathy (PDR) mainly occurs when many of the blood vessels in the retina close, preventing enough blood flow. In an attempt to supply blood to the area where the original vessels closed, the retina responds by growing new blood vessels. This is called neovascularization. However, these new blood vessels are abnormal and do not supply the retina with proper blood flow. The new vessels are also often accompanied by scar tissue that may cause the retina to wrinkle or detach. PDR may cause more severe vision loss than NPDR because it can affect both central and peripheral vision.     A patient diagnosed with proliferative diabetic eye disease will be referred to a retinal specialist for consultation.    Often there are no visual symptoms in the early stages of diabetic retinopathy. That is why our eye care professionals recommend that everyone with diabetes have a comprehensive dilated eye examination once a year. Early detection and treatment can limit the potential for significant vision loss from diabetic retinopathy.

## 2017-06-28 NOTE — PROGRESS NOTES
HPI     Annual Exam    Additional comments: DLE 6-16 (cristiane)    ocular health exam            Diabetic Eye Exam    Additional comments: BSL controlled           Blurred Vision    Additional comments: at both near & distance --- trouble w/ night   driving, headlights bothersome           Dry Eye    Additional comments: OU tearing -- no gtts           Eye Pain    Additional comments: OS sensitive to pressure when touching/pressing on   it           Comments   Hemoglobin A1C       Date                     Value               Ref Range             Status                05/17/2017               6.0                 4.5 - 6.2 %           Final              Comment:    According to ADA guidelines, hemoglobin A1C <7.0% represents  optimal   control in non-pregnant diabetic patients.  Different  metrics may apply   to specific populations.   Standards of Medical Care in Diabetes -   2016.  For the purpose of screening for the presence of diabetes:  <5.7%       Consistent with the absence of diabetes  5.7-6.4%  Consistent with   increasing risk for diabetes   (prediabetes)  >or=6.5%  Consistent with   diabetes  Currently no consensus exists for use of hemoglobin A1C  for   diagnosis of diabetes for children.         02/13/2017               5.6                 4.5 - 6.2 %           Final              Comment:    According to ADA guidelines, hemoglobin A1C <7.0% represents  optimal   control in non-pregnant diabetic patients.  Different  metrics may apply   to specific populations.   Standards of Medical Care in Diabetes -   2016.  For the purpose of screening for the presence of diabetes:  <5.7%       Consistent with the absence of diabetes  5.7-6.4%  Consistent with   increasing risk for diabetes   (prediabetes)  >or=6.5%  Consistent with   diabetes  Currently no consensus exists for use of hemoglobin A1C  for   diagnosis of diabetes for children.         11/14/2016               5.7                 4.5 - 6.2 %            Final              Comment:    According to ADA guidelines, hemoglobin A1C <7.0% represents  optimal   control in non-pregnant diabetic patients.  Different  metrics may apply   to specific populations.   Standards of Medical Care in Diabetes -   2016.  For the purpose of screening for the presence of diabetes:  <5.7%       Consistent with the absence of diabetes  5.7-6.4%  Consistent with   increasing risk for diabetes   (prediabetes)  >or=6.5%  Consistent with   diabetes  Currently no consensus exists for use of hemoglobin A1C  for   diagnosis of diabetes for children.    ----------    Agree above  Good control glucose  BP reported doing well  Strain and blur when reading rebeka small print  Some dry eye and tearing, same  No other new issues         Last edited by MAGNOLIA Mcintosh, OD on 6/28/2017  9:38 AM. (History)        ROS     Positive for: Eyes    Negative for: Constitutional, Gastrointestinal, Neurological, Skin,   Genitourinary, Musculoskeletal, HENT, Endocrine, Cardiovascular,   Respiratory, Psychiatric, Allergic/Imm, Heme/Lymph    Last edited by MAGNOLIA Mcintosh, OD on 6/28/2017  9:38 AM. (History)        Assessment /Plan     For exam results, see Encounter Report.    Diabetes mellitus type 2 without retinopathy    Nuclear sclerosis, bilateral    Posterior vitreous detachment, left    Glaucoma screening    Blepharitis of upper and lower eyelids of both eyes, unspecified type    Hyperopia with astigmatism and presbyopia, bilateral      1. No ret/ no csme, gave info, control glucose, annual DFE  2. Early vis sig, not ready for consult  3. Rd precautions given  4. Not suspect  5. Longstanding w/ transient s/s  Encouraged resume lid hygiene / scrubs qhs and ATs daily  Message if not effective    6. Updated specs rx, gave copy, fill prn    Discussed and educated patient on current findings /plan.  RTC 1 year, prn if any changes / issues

## 2017-08-02 ENCOUNTER — OFFICE VISIT (OUTPATIENT)
Dept: OPTOMETRY | Facility: CLINIC | Age: 77
End: 2017-08-02
Payer: MEDICARE

## 2017-08-02 DIAGNOSIS — H10.829 ROSACEA BLEPHAROCONJUNCTIVITIS: Primary | ICD-10-CM

## 2017-08-02 PROCEDURE — 92012 INTRM OPH EXAM EST PATIENT: CPT | Mod: S$GLB,,, | Performed by: OPTOMETRIST

## 2017-08-02 PROCEDURE — 99999 PR PBB SHADOW E&M-EST. PATIENT-LVL II: CPT | Mod: PBBFAC,,, | Performed by: OPTOMETRIST

## 2017-08-02 RX ORDER — NEOMYCIN SULFATE, POLYMYXIN B SULFATE, AND DEXAMETHASONE 3.5; 10000; 1 MG/G; [USP'U]/G; MG/G
OINTMENT OPHTHALMIC
Qty: 3.5 G | Refills: 1 | Status: SHIPPED | OUTPATIENT
Start: 2017-08-02 | End: 2018-04-04

## 2017-08-02 RX ORDER — KETOTIFEN FUMARATE 0.35 MG/ML
1 SOLUTION/ DROPS OPHTHALMIC 2 TIMES DAILY
COMMUNITY
End: 2018-04-04

## 2017-08-02 NOTE — PROGRESS NOTES
HPI     Eye Problem    Additional comments: Red, itchy, burning eyes.           Comments   DLS: 6/28/17    Pt states still having trouble with itching, burning and red eyes. Has   been doing the lid scrubs BID OU.     Gtts: Zaditor BID OU       Last edited by Cheryle Quintana on 8/2/2017  3:17 PM. (History)        ROS     Positive for: Eyes    Negative for: Constitutional, Gastrointestinal, Neurological, Skin,   Genitourinary, Musculoskeletal, HENT, Endocrine, Cardiovascular,   Respiratory, Psychiatric, Allergic/Imm, Heme/Lymph    Last edited by MAGNOLIA Mcintosh, OD on 8/2/2017  3:53 PM. (History)        Assessment /Plan     For exam results, see Encounter Report.    Rosacea blepharoconjunctivitis  -     neomycin-polymyxin-dexamethasone (MAXITROL) 3.5 mg/g-10,000 unit/g-0.1 % Oint; Apply sparingly to eyelid margins OU, qhs x 7 days then prn to control blepharitis symptoms  Dispense: 3.5 g; Refill: 1      Discussed s/s and probable chronic nature as patient ages  Continue lid scrubs qhs  ATs daily tid+ rebeka with near work    Add maxitrol dom qhs for 7 days the prn for flare ups  Discussed possible course of oral doxy   Call/ message with report 7-10 days

## 2017-08-09 ENCOUNTER — TELEPHONE (OUTPATIENT)
Dept: OPTOMETRY | Facility: CLINIC | Age: 77
End: 2017-08-09

## 2017-08-09 DIAGNOSIS — H10.829 ROSACEA BLEPHAROCONJUNCTIVITIS: Primary | ICD-10-CM

## 2017-08-09 RX ORDER — DOXYCYCLINE 50 MG/1
50 CAPSULE ORAL DAILY
Qty: 30 CAPSULE | Refills: 0 | Status: SHIPPED | OUTPATIENT
Start: 2017-08-09 | End: 2017-09-08

## 2017-08-09 NOTE — TELEPHONE ENCOUNTER
----- Message from Bambi Ro sent at 8/9/2017  2:12 PM CDT -----  Contact: self  Wants to try Doxy --- sched for recheck 8-17.    ----- Message -----  From: MAGNOLIA Mcintosh OD  Sent: 8/9/2017   1:25 PM  To: Bambi Ro    Next on the list to add: Oral doxycycline.      I can send to pharmacy, and have pt follow up next week.  Advise pt that bleph symtpoms for many patients are not cured, but only controlled with meds.      ----- Message -----  From: Bambi Ro  Sent: 8/9/2017   1:15 PM  To: MAGNOLIA Mcintosh OD        ----- Message -----  From: Glenda Morfin  Sent: 8/9/2017  11:31 AM  To: Dayna [facility] Staff    Meds that were prescribed have not cleared everything up.  Please call back at [phone] cell

## 2017-08-17 ENCOUNTER — OFFICE VISIT (OUTPATIENT)
Dept: OPTOMETRY | Facility: CLINIC | Age: 77
End: 2017-08-17
Payer: MEDICARE

## 2017-08-17 DIAGNOSIS — H10.829 ROSACEA BLEPHAROCONJUNCTIVITIS: Primary | ICD-10-CM

## 2017-08-17 PROCEDURE — 92012 INTRM OPH EXAM EST PATIENT: CPT | Mod: S$GLB,,, | Performed by: OPTOMETRIST

## 2017-08-17 PROCEDURE — 99999 PR PBB SHADOW E&M-EST. PATIENT-LVL III: CPT | Mod: PBBFAC,,, | Performed by: OPTOMETRIST

## 2017-08-17 NOTE — PROGRESS NOTES
HPI     Eye Problem    Additional comments: f/u bleph - OU, pt started on doxycycine 50mg po x 1   week // pt states no improvement, c/o OU red, irritated --- OD tearing //   discontinued gtts & dom when started on Doxy           Eye Pain    Additional comments: can feel some irritation & discomfort OS only when   touching on it           Comments   Agree above  Complaint still w/ redness and crusting worse in am  Still lid scrubs qhs  D/c gtts/ dom x 1 week  Doxy as directed x 1 week         Last edited by MAGNOLIA Mcintosh, OD on 8/17/2017 10:28 AM. (History)        ROS     Positive for: Eyes    Negative for: Constitutional, Gastrointestinal, Neurological, Skin,   Genitourinary, Musculoskeletal, HENT, Endocrine, Cardiovascular,   Respiratory, Psychiatric, Allergic/Imm, Heme/Lymph    Last edited by MAGNOLIA Mcintosh, OD on 8/17/2017 10:28 AM. (History)        Assessment /Plan     For exam results, see Encounter Report.    Rosacea blepharoconjunctivitis      Improved per appearance and history  Continue lid hygiene w/ scrubs qhs  Add ATs daily tid+ rebeka with near work  Continue oral doxy as previous--discussed again long term care and chronic nature of condition    Call/ message 2 weeks with updated, consider Dr Jarquin if worsening

## 2017-08-29 RX ORDER — METFORMIN HYDROCHLORIDE 500 MG/1
TABLET ORAL
Qty: 180 TABLET | Refills: 3 | Status: SHIPPED | OUTPATIENT
Start: 2017-08-29 | End: 2018-11-29 | Stop reason: ALTCHOICE

## 2017-09-11 RX ORDER — PANTOPRAZOLE SODIUM 40 MG/1
TABLET, DELAYED RELEASE ORAL
Qty: 90 TABLET | Refills: 1 | Status: SHIPPED | OUTPATIENT
Start: 2017-09-11 | End: 2018-01-16 | Stop reason: SDUPTHER

## 2017-09-11 NOTE — PROGRESS NOTES
Refill Authorization Note     is requesting a refill authorization.    Brief assessment and rational for refill: APPROVE; prr  Name of medication: pantoprazole 40 mg  How patient will take medication: t1t po daily  Amount/Quantity of medication ordered: 90  Medication reconciliation completed: No        Refills Authorized: Yes  If authorized number of refills: 1        Medication Therapy Plan: Efficacy of pantoprazole for GERD not commented on recently.  Approve for 6mo   Comments:

## 2017-10-03 NOTE — PROGRESS NOTES
Refill Authorization Note     is requesting a refill authorization.    Brief assessment and rational for refill: APPROVE: prr  Name of medication: ATORVASTATIN CALCIUM 20 MG Tablet  How patient will take medication: t1t po daily   Amount/Quantity of medication ordered: 90d  Medication reconciliation completed: No        Refills Authorized: Yes  If authorized number of refills: 1        Medication Therapy Plan: last lip 5/17.  Will approve 6 mo.  Based on ASCVD risk calculator pt may benefit from high intens statin (40mg vs 20mg)   Comments:   Lab Results   Component Value Date    CHOL 182 05/17/2017    CHOL 176 11/14/2016    CHOL 178 05/18/2016     Lab Results   Component Value Date    HDL 59 05/17/2017    HDL 50 11/14/2016    HDL 58 05/18/2016     Lab Results   Component Value Date    LDLCALC 98.2 05/17/2017    LDLCALC 94.2 11/14/2016    LDLCALC 100.4 05/18/2016     Lab Results   Component Value Date    TRIG 124 05/17/2017    TRIG 159 (H) 11/14/2016    TRIG 98 05/18/2016     Lab Results   Component Value Date    CHOLHDL 32.4 05/17/2017    CHOLHDL 28.4 11/14/2016    CHOLHDL 32.6 05/18/2016      The 10-year ASCVD risk score (Zachariah PEREZ Jr., et al., 2013) is: 47.4%    Values used to calculate the score:      Age: 77 years      Sex: Male      Is Non- : No      Diabetic: Yes      Tobacco smoker: Yes      Systolic Blood Pressure: 128 mmHg      Is BP treated: Yes      HDL Cholesterol: 59 mg/dL      Total Cholesterol: 182 mg/dL     Lab Results   Component Value Date    ALT 12 05/17/2017    AST 16 05/17/2017    ALKPHOS 57 05/17/2017    BILITOT 0.9 05/17/2017

## 2017-10-04 ENCOUNTER — OFFICE VISIT (OUTPATIENT)
Dept: FAMILY MEDICINE | Facility: CLINIC | Age: 77
End: 2017-10-04
Payer: MEDICARE

## 2017-10-04 VITALS
DIASTOLIC BLOOD PRESSURE: 70 MMHG | HEART RATE: 60 BPM | BODY MASS INDEX: 23.51 KG/M2 | HEIGHT: 74 IN | SYSTOLIC BLOOD PRESSURE: 148 MMHG | WEIGHT: 183.19 LBS

## 2017-10-04 DIAGNOSIS — M54.50 ACUTE LEFT-SIDED LOW BACK PAIN WITHOUT SCIATICA: ICD-10-CM

## 2017-10-04 DIAGNOSIS — S29.012A STRAIN OF MUSCLE AND TENDON OF BACK WALL OF THORAX, INITIAL ENCOUNTER: Primary | ICD-10-CM

## 2017-10-04 LAB
BILIRUB UR QL STRIP: NEGATIVE
CLARITY UR: CLEAR
COLOR UR: YELLOW
GLUCOSE UR QL STRIP: NEGATIVE
HGB UR QL STRIP: NEGATIVE
KETONES UR QL STRIP: NEGATIVE
LEUKOCYTE ESTERASE UR QL STRIP: NEGATIVE
NITRITE UR QL STRIP: NEGATIVE
PH UR STRIP: 6 [PH] (ref 5–8)
PROT UR QL STRIP: NEGATIVE
SP GR UR STRIP: <=1.005 (ref 1–1.03)
URN SPEC COLLECT METH UR: ABNORMAL

## 2017-10-04 PROCEDURE — 99999 PR PBB SHADOW E&M-EST. PATIENT-LVL III: CPT | Mod: PBBFAC,,, | Performed by: PHYSICIAN ASSISTANT

## 2017-10-04 PROCEDURE — 99214 OFFICE O/P EST MOD 30 MIN: CPT | Mod: S$GLB,,, | Performed by: PHYSICIAN ASSISTANT

## 2017-10-04 PROCEDURE — 81003 URINALYSIS AUTO W/O SCOPE: CPT | Mod: PO

## 2017-10-04 RX ORDER — ATORVASTATIN CALCIUM 20 MG/1
TABLET, FILM COATED ORAL
Qty: 90 TABLET | Refills: 1 | Status: SHIPPED | OUTPATIENT
Start: 2017-10-04 | End: 2018-02-23 | Stop reason: SDUPTHER

## 2017-10-04 RX ORDER — MELOXICAM 15 MG/1
15 TABLET ORAL DAILY
Qty: 7 TABLET | Refills: 0 | Status: SHIPPED | OUTPATIENT
Start: 2017-10-04 | End: 2017-10-11

## 2017-10-04 NOTE — PROGRESS NOTES
Subjective:       Patient ID: Carolann Waite is a 77 y.o. male    Chief Complaint: Back Pain (L side)    HPI  Mr Waite presents today CO left sided mid back pain for the past 1 week.  Pain began after he did some lifting while doing yard work.  He denies any dysuria, no urinary frequency, he admits to some urinary incontinence but no more than usual.  No fever, no abdominal pain. His back pain is worse with certain positions.  He is requesting urine testing today because he is diabetic and he is concerned that his pain is coming form his left kidney.      Review of Systems   Constitutional: Negative for chills and fever.   Cardiovascular: Negative for chest pain.   Gastrointestinal: Negative for abdominal pain.   Genitourinary: Negative for difficulty urinating, dysuria, flank pain and frequency.   Musculoskeletal: Positive for back pain. Negative for arthralgias.   Skin: Negative for rash and wound.   Neurological: Negative for numbness.         Objective:   Physical Exam   Constitutional: He is oriented to person, place, and time. He appears well-developed and well-nourished. No distress.   HENT:   Head: Normocephalic and atraumatic.   Right Ear: External ear normal.   Left Ear: External ear normal.   Nose: Nose normal.   Mouth/Throat: Oropharynx is clear and moist.   Eyes: Conjunctivae and EOM are normal. Pupils are equal, round, and reactive to light.   Neck: Normal range of motion. Neck supple. No JVD present.   Cardiovascular: Normal rate, regular rhythm and normal heart sounds.  Exam reveals no gallop and no friction rub.    No murmur heard.  Pulmonary/Chest: Effort normal and breath sounds normal. No respiratory distress. He has no wheezes. He has no rales.   Abdominal: Soft. Bowel sounds are normal. He exhibits no distension and no mass. There is no tenderness. There is no guarding.   Musculoskeletal: Normal range of motion. He exhibits no edema.   ttp of the left mid back over the musculature, no crepitus  felt, no edema or obvious deformity   Neurological: He is alert and oriented to person, place, and time.   Skin: Skin is warm and dry.   Psychiatric: He has a normal mood and affect. His behavior is normal. Judgment and thought content normal.         Assessment:       1. Strain of muscle and tendon of back wall of thorax, initial encounter  meloxicam (MOBIC) 15 MG tablet   2. Acute left-sided low back pain without sciatica  URINALYSIS         Plan:       Strain of muscle and tendon of back wall of thorax, initial encounter  -     meloxicam (MOBIC) 15 MG tablet; Take 1 tablet (15 mg total) by mouth once daily.  Dispense: 7 tablet; Refill: 0    Acute left-sided low back pain without sciatica  -     URINALYSIS

## 2017-10-11 ENCOUNTER — TELEPHONE (OUTPATIENT)
Dept: FAMILY MEDICINE | Facility: CLINIC | Age: 77
End: 2017-10-11

## 2017-10-17 RX ORDER — AMLODIPINE BESYLATE 10 MG/1
TABLET ORAL
Qty: 90 TABLET | Refills: 1 | Status: SHIPPED | OUTPATIENT
Start: 2017-10-17 | End: 2018-03-05 | Stop reason: SDUPTHER

## 2017-10-17 NOTE — TELEPHONE ENCOUNTER
Refill Authorization Note     is requesting a refill authorization.    Brief assessment and rational for refill: APPROVE: prr  Name of medication: AMLODIPINE BESYLATE 10 MG Tablet  How patient will take medication: t1t po daily   Amount/Quantity of medication ordered: 90d   Medication reconciliation completed: No        Refills Authorized: Yes  If authorized number of refills: 1        Medication Therapy Plan: Last BP elevated although was in pain.  Normally controlled.  Will approve 6 mo from last visit  Comments:   BP Readings from Last 3 Encounters:   10/04/17 (!) 148/70   05/24/17 128/64   02/16/17 132/64

## 2017-10-30 NOTE — TELEPHONE ENCOUNTER
----- Message from Caroline Valentine sent at 10/30/2017  8:28 AM CDT -----  Patient requested refill on  Etodolac 200mg, call into [facility] pharmacy at  [phone]. Please call patient at  [phone], patient states he is out of medicaton,  if you have any questions. Thank you.           [facility]/pharmacy #[id number] - [place] - [address] [place] [address] [address]  Phone: [phone] Fax: [phone]

## 2017-10-31 RX ORDER — ETODOLAC 200 MG/1
CAPSULE ORAL
Qty: 30 CAPSULE | Refills: 0 | OUTPATIENT
Start: 2017-10-31

## 2017-10-31 NOTE — TELEPHONE ENCOUNTER
Request is already pending in another encounter.  Please consult Patient Station for more details.  Thanks

## 2017-10-31 NOTE — PROGRESS NOTES
Refill Authorization Note     is requesting a refill authorization.    Brief assessment and rational for refill: APPROVE; prr  Name of medication: etodolac (LODINE) 200 MG Cap  How patient will take medication: t1t po q8h prn gout  Amount/Quantity of medication ordered: 90  Medication reconciliation completed: No        Refills Authorized: Yes  If authorized number of refills: 1     Medication-related problems identified: Drug-drug interaction  Medication Therapy Plan: Pt last seen 5/17.  last kidney labs WNL. BP normally controlled.  Kimo 6 mo.  Of note; recently seen by NP for muscle strain and was prescribed meloxicam, also an NSAID.    Comments:   Lab Results   Component Value Date    CREATININE 1.4 05/17/2017    BUN 20 05/17/2017     05/17/2017    K 4.2 05/17/2017     05/17/2017    CO2 27 05/17/2017      BP Readings from Last 3 Encounters:   10/04/17 (!) 148/70   05/24/17 128/64   02/16/17 132/64

## 2017-11-01 RX ORDER — ETODOLAC 200 MG/1
200 CAPSULE ORAL EVERY 8 HOURS PRN
Qty: 90 CAPSULE | Refills: 1 | Status: SHIPPED | OUTPATIENT
Start: 2017-11-01

## 2017-11-01 RX ORDER — VALSARTAN 320 MG/1
TABLET ORAL
Qty: 90 TABLET | Refills: 0 | Status: SHIPPED | OUTPATIENT
Start: 2017-11-01 | End: 2018-01-03 | Stop reason: SDUPTHER

## 2017-11-01 NOTE — PROGRESS NOTES
Refill Authorization Note     is requesting a refill authorization.    Brief assessment and rational for refill: APPROVE: prr  Name of medication: VALSARTAN 320 MG Tablet  How patient will take medication: t1t po daily   Amount/Quantity of medication ordered: 90d   Medication reconciliation completed: No        Refills Authorized: Yes  If authorized number of refills: 0        Medication Therapy Plan: BP uncontrolled; will RTC in 1 mo w/ f/u labs already scheduled.  Approve 3 mo   Comments:   Lab Results   Component Value Date    CREATININE 1.4 05/17/2017    BUN 20 05/17/2017     05/17/2017    K 4.2 05/17/2017     05/17/2017    CO2 27 05/17/2017      BP Readings from Last 3 Encounters:   10/04/17 (!) 148/70   05/24/17 128/64   02/16/17 132/64

## 2017-12-07 ENCOUNTER — PATIENT OUTREACH (OUTPATIENT)
Dept: ADMINISTRATIVE | Facility: HOSPITAL | Age: 77
End: 2017-12-07

## 2017-12-07 NOTE — LETTER
December 7, 2017    Carolann Waite  [address] [address]             [facility]  [address]  [place] [address]  Phone: [phone] Dear Mr. Waite:    [facility] is committed to your overall health!    Your last recorded [facility] blood pressure reading was elevated.  Please schedule a nurse visit or Doctor visit to have your blood pressure retaken.    Please take any prescribed medication as directed the day of the appointment.  Please bring in a home blood pressure machine to compare if you have one.      Please schedule these appointments at your earliest convenience by calling [phone] or going to [email or web address].       Sincerely,    Wendy Goodwin  Clinical Care Coordinator  [facility] [address] [place] [address]  Phone: [phone]   Fax: [phone]

## 2018-01-04 NOTE — PROGRESS NOTES
Refill Authorization Note     is requesting a refill authorization.    Brief assessment and rationale for refill: APPROVE: prr  Amount/Quantity of medication ordered: 90d         Refills Authorized: Yes  If authorized number of refills: 2           Medication Therapy Plan: BP normally controlled; was recently elevated 2/2 muscle strain; kidney labs WNL; approve 9 more mo   Name and strength of medication: VALSARTAN 320 MG Tablet  How patient will take medication: t1t po daily   Medication reconciliation completed: No  Comments:   Lab Results   Component Value Date    CREATININE 1.01 11/06/2017    BUN 21 11/06/2017     11/06/2017    K 4.4 11/06/2017     11/06/2017    CO2 24 11/06/2017      BP Readings from Last 3 Encounters:   10/04/17 (!) 148/70   05/24/17 128/64   02/16/17 132/64

## 2018-01-05 RX ORDER — VALSARTAN 320 MG/1
TABLET ORAL
Qty: 90 TABLET | Refills: 2 | Status: SHIPPED | OUTPATIENT
Start: 2018-01-05 | End: 2018-08-14

## 2018-01-08 ENCOUNTER — PES CALL (OUTPATIENT)
Dept: ADMINISTRATIVE | Facility: CLINIC | Age: 78
End: 2018-01-08

## 2018-01-17 NOTE — PROGRESS NOTES
Refill Authorization Note     is requesting a refill authorization.    Brief assessment and rationale for refill: APPROVE: prr  Amount/Quantity of medication ordered: 90d         Refills Authorized: Yes  If authorized number of refills: 0           Medication Therapy Plan: GERD not commented on recently; kapil 3more mo   Name and strength of medication: PANTOPRAZOLE SODIUM 40 MG Tablet Delayed Release  How patient will take medication: t1t po daily   Medication reconciliation completed: No  Comments:   Lab Results   Component Value Date    WBC 7.33 11/06/2017    HGB 16.0 11/06/2017    HCT 46.5 11/06/2017    MCV 93 11/06/2017     11/06/2017

## 2018-01-18 RX ORDER — PANTOPRAZOLE SODIUM 40 MG/1
TABLET, DELAYED RELEASE ORAL
Qty: 90 TABLET | Refills: 0 | Status: SHIPPED | OUTPATIENT
Start: 2018-01-18 | End: 2018-03-26 | Stop reason: SDUPTHER

## 2018-02-23 NOTE — PROGRESS NOTES
Refill Authorization Note     is requesting a refill authorization.    Brief assessment and rationale for refill: APPROVE; prr  Amount/Quantity of medication ordered: 90d        Refills Authorized: Yes  If authorized number of refills: 2           Medication Therapy Plan: Lastlipids 11/17; approve 9 more mo   Name and strength of medication: ATORVASTATIN CALCIUM 20 MG Tablet  How patient will take medication: t1t po daily  Medication reconciliation completed: No  Comments:   Lab Results   Component Value Date    CHOL 203 (H) 11/06/2017    CHOL 182 05/17/2017    CHOL 176 11/14/2016     Lab Results   Component Value Date    HDL 62 11/06/2017    HDL 59 05/17/2017    HDL 50 11/14/2016     Lab Results   Component Value Date    LDLCALC 112.4 11/06/2017    LDLCALC 98.2 05/17/2017    LDLCALC 94.2 11/14/2016     Lab Results   Component Value Date    TRIG 143 11/06/2017    TRIG 124 05/17/2017    TRIG 159 (H) 11/14/2016     Lab Results   Component Value Date    CHOLHDL 30.5 11/06/2017    CHOLHDL 32.4 05/17/2017    CHOLHDL 28.4 11/14/2016

## 2018-02-26 RX ORDER — ATORVASTATIN CALCIUM 20 MG/1
TABLET, FILM COATED ORAL
Qty: 90 TABLET | Refills: 2 | Status: SHIPPED | OUTPATIENT
Start: 2018-02-26

## 2018-03-06 NOTE — PROGRESS NOTES
Refill Authorization Note     is requesting a refill authorization.    Brief assessment and rationale for refill: APPROVE: prr  Amount/Quantity of medication ordered: 90d         Refills Authorized: Yes  If authorized number of refills: 0           Medication Therapy Plan: BP typically controlled but latest reading 2/2 to acute issue; will approve 3 more mo; will need an annual before next refill  Name and strength of medication: AMLODIPINE BESYLATE 10 MG Tablet  How patient will take medication: t1t po daily   Medication reconciliation completed: No  Comments:   BP Readings from Last 3 Encounters:   10/04/17 (!) 148/70   05/24/17 128/64   02/16/17 132/64

## 2018-03-07 RX ORDER — AMLODIPINE BESYLATE 10 MG/1
TABLET ORAL
Qty: 90 TABLET | Refills: 0 | Status: SHIPPED | OUTPATIENT
Start: 2018-03-07 | End: 2018-05-10 | Stop reason: SDUPTHER

## 2018-03-26 DIAGNOSIS — K21.9 GASTROESOPHAGEAL REFLUX DISEASE, ESOPHAGITIS PRESENCE NOT SPECIFIED: Primary | ICD-10-CM

## 2018-03-27 ENCOUNTER — OFFICE VISIT (OUTPATIENT)
Dept: NEUROSURGERY | Facility: CLINIC | Age: 78
End: 2018-03-27
Payer: MEDICARE

## 2018-03-27 VITALS
HEIGHT: 74 IN | DIASTOLIC BLOOD PRESSURE: 69 MMHG | BODY MASS INDEX: 23.97 KG/M2 | WEIGHT: 186.75 LBS | SYSTOLIC BLOOD PRESSURE: 156 MMHG | HEART RATE: 59 BPM

## 2018-03-27 DIAGNOSIS — M54.12 CERVICAL RADICULOPATHY: ICD-10-CM

## 2018-03-27 PROCEDURE — 3078F DIAST BP <80 MM HG: CPT | Mod: CPTII,S$GLB,, | Performed by: NEUROLOGICAL SURGERY

## 2018-03-27 PROCEDURE — 99204 OFFICE O/P NEW MOD 45 MIN: CPT | Mod: S$GLB,,, | Performed by: NEUROLOGICAL SURGERY

## 2018-03-27 PROCEDURE — 99999 PR PBB SHADOW E&M-EST. PATIENT-LVL IV: CPT | Mod: PBBFAC,,, | Performed by: NEUROLOGICAL SURGERY

## 2018-03-27 PROCEDURE — 3077F SYST BP >= 140 MM HG: CPT | Mod: CPTII,S$GLB,, | Performed by: NEUROLOGICAL SURGERY

## 2018-03-27 RX ORDER — MELOXICAM 7.5 MG/1
7.5 TABLET ORAL DAILY
COMMUNITY
End: 2018-03-27 | Stop reason: CLARIF

## 2018-03-27 RX ORDER — GABAPENTIN 300 MG/1
300 CAPSULE ORAL DAILY
Status: ON HOLD | COMMUNITY
End: 2018-08-15

## 2018-03-27 NOTE — LETTER
May 1, 2018      Gabe Panchal MD  [address] [facility] [address]           [facility]  [address] [facility] [address] [place] [address]  Phone: [phone]  Fax: [phone]          Patient: Carolann Waite   MR Number: [id number]   YOB: 1940   Date of Visit: 3/27/2018       Dear Dr. Gabe Panchal:    Thank you for referring Carolann Waite to me for evaluation. Attached you will find relevant portions of my assessment and plan of care.    If you have questions, please do not hesitate to call me. I look forward to following Carolann Waite along with you.    Sincerely,    Gabe Collins  CC:  No Recipients    If you would like to receive this communication electronically, please contact [email or web address] or [phone] to request more information on [employer] access.    For providers and/or their staff who would like to refer a patient to [facility], please contact us through our one-stop-shop provider referral line, Rambo Ruiz, at [phone].    If you feel you have received this communication in error or would no longer like to receive these types of communications, please e-mail [email or web address]

## 2018-03-27 NOTE — PROGRESS NOTES
Neurosurgery History and Physical    Patient ID: Carolann Waite is a 77 y.o. male.    Chief Complaint   Patient presents with    Cervical Spine Pain (C-spine)     pt states about 20years ago he had a pinched nerve in neck and went to PT and helped, this onset has been going on for about 3 months, from base of skull into LUE into fingertips causing numbness and tingling, pain when moving head to the left under scapula and shooting down LUE, some tenderness to right shoulder, has had headaches but bought a new pillow and has stopped, PT for 8 wks and is helping some       Review of Systems   Constitutional: Negative.    HENT: Negative.    Eyes: Negative.    Respiratory: Negative.    Cardiovascular: Negative.    Gastrointestinal: Negative.    Endocrine: Negative.    Genitourinary: Negative.    Musculoskeletal: Positive for back pain and neck pain.   Skin: Negative.    Allergic/Immunologic: Negative.    Neurological: Positive for numbness. Negative for weakness.   Hematological: Negative.    Psychiatric/Behavioral: Negative.        Past Medical History:   Diagnosis Date    Arthritis     Cataract     OU    Colon polyps     Diabetes     Diabetes mellitus, type 2     Gout     HTN (hypertension)     Hyperlipidemia LDL goal <100     Impotence     Melanoma     Left forearm, s/p excision     Social History     Social History    Marital status:      Spouse name: N/A    Number of children: N/A    Years of education: N/A     Occupational History    Not on file.     Social History Main Topics    Smoking status: Current Some Day Smoker     Types: Cigars    Smokeless tobacco: Never Used      Comment: smokes a cigar on occasion    Alcohol use No    Drug use: No    Sexual activity: Yes     Partners: Female     Other Topics Concern    Not on file     Social History Narrative    No narrative on file     Family History   Problem Relation Age of Onset    Diabetes Mother     Heart disease Brother 56     mi     Cancer Brother      Prostate    Heart attack Brother     Cancer Father      lymphoma    Hypertension Father     Nephrolithiasis Father      Review of patient's allergies indicates:   Allergen Reactions    No known drug allergies        Current Outpatient Prescriptions:     ACCU-CHEK MILENA PLUS METER Misc, USE AS DIRECTED, Disp: 1 each, Rfl: 1    ACCU-CHEK MILENA PLUS TEST STRP Strp, CHECK BLOOD SUGAR TWICE DAILY, Disp: 200 strip, Rfl: 11    ACCU-CHEK SOFTCLIX LANCETS Misc, , Disp: , Rfl:     acyclovir (ZOVIRAX) 400 MG tablet, , Disp: , Rfl: 1    amLODIPine (NORVASC) 10 MG tablet, TAKE 1 TABLET EVERY DAY, Disp: 90 tablet, Rfl: 0    atorvastatin (LIPITOR) 20 MG tablet, TAKE 1 TABLET EVERY DAY, Disp: 90 tablet, Rfl: 2    etodolac (LODINE) 200 MG Cap, Take 1 capsule (200 mg total) by mouth every 8 (eight) hours as needed. To use for gout attack related pain instead of indocin., Disp: 90 capsule, Rfl: 1    fexofenadine (ALLEGRA) 180 MG tablet, Take 1 tablet by mouth daily as needed. , Disp: , Rfl:     fluticasone (FLONASE) 50 mcg/actuation nasal spray, 2 sprays by Nasal route daily as needed. 1 - 2 Spray(s) Nasal daily., Disp: 16 g, Rfl: 2    gabapentin (NEURONTIN) 300 MG capsule, Take 300 mg by mouth once daily., Disp: , Rfl:     metformin (GLUCOPHAGE) 500 MG tablet, TAKE 1 TABLET TWICE DAILY WITH MEALS, Disp: 180 tablet, Rfl: 3    pantoprazole (PROTONIX) 40 MG tablet, TAKE 1 TABLET EVERY DAY, Disp: 90 tablet, Rfl: 0    triamcinolone acetonide 0.1% (KENALOG) 0.1 % cream, , Disp: , Rfl:     valsartan (DIOVAN) 320 MG tablet, TAKE 1 TABLET EVERY DAY, Disp: 90 tablet, Rfl: 2    azelastine (OPTIVAR) 0.05 % ophthalmic solution, , Disp: , Rfl:     ketotifen (ZADITOR) 0.025 % (0.035 %) ophthalmic solution, Place 1 drop into both eyes 2 (two) times daily., Disp: , Rfl:     neomycin-polymyxin-dexamethasone (MAXITROL) 3.5 mg/g-10,000 unit/g-0.1 % Oint, Apply sparingly to eyelid margins OU, qhs x 7 days then  "prn to control blepharitis symptoms, Disp: 3.5 g, Rfl: 1  Blood pressure (!) 156/69, pulse (!) 59, height 6' 2" (1.88 m), weight 84.7 kg (186 lb 11.7 oz).      Neurologic Exam     Mental Status   Oriented to person, place, and time.   Attention: normal. Concentration: normal.   Speech: speech is normal   Level of consciousness: alert  Knowledge: good.     Cranial Nerves     CN II   Visual acuity: normal    CN III, IV, VI   Pupils are equal, round, and reactive to light.  Extraocular motions are normal.     CN V   Facial sensation intact.     CN VII   Facial expression full, symmetric.     CN VIII   Hearing: intact    CN IX, X   Palate: symmetric    CN XI   CN XI normal.     CN XII   CN XII normal.     Motor Exam   Muscle bulk: normal  Overall muscle tone: normal  Right arm pronator drift: absent  Left arm pronator drift: absent    Strength   Right deltoid: 5/5  Left deltoid: 5/5  Right biceps: 5/5  Left biceps: 5/5  Right triceps: 5/5  Left triceps: 5/5  Right wrist flexion: 5/5  Left wrist flexion: 5/5  Right wrist extension: 5/5  Left wrist extension: 5/5  Right interossei: 5/5  Left interossei: 5/5  Right iliopsoas: 5/5  Left iliopsoas: 5/5  Right quadriceps: 5/5  Left quadriceps: 5/5  Right hamstrin/5  Left hamstrin/5  Right anterior tibial: 5/5  Left anterior tibial: 5/5  Right posterior tibial: 5/5  Left posterior tibial: 5/5  Right peroneal: 5/5  Left peroneal: 5/5  Right gastroc: 5/5  Left gastroc: 5/5    Sensory Exam   Light touch normal.     Gait, Coordination, and Reflexes     Gait  Gait: normal    Coordination   Romberg: negative  Finger to nose coordination: normal    Tremor   Resting tremor: absent    Reflexes   Right brachioradialis: 2+  Left brachioradialis: 2+  Right biceps: 2+  Left biceps: 2+  Right triceps: 2+  Left triceps: 2+  Right patellar: 3+  Left patellar: 3+  Right achilles: 1+  Left achilles: 1+  Right plantar: normal  Left plantar: normal  Right Arriaga: absent  Left Arriaga: " "absent  Right ankle clonus: absent (3 beats)  Left ankle clonus: absent (3 beats)      Physical Exam   Constitutional: He is oriented to person, place, and time. He appears well-developed and well-nourished.   HENT:   Head: Normocephalic and atraumatic.   Eyes: EOM are normal. Pupils are equal, round, and reactive to light.   Neck: Normal range of motion. Neck supple.   Cardiovascular: Normal rate and regular rhythm.    Pulmonary/Chest: Effort normal.   Abdominal: Soft.   Musculoskeletal: Normal range of motion.   Neurological: He is alert and oriented to person, place, and time. He has a normal Finger-Nose-Finger Test and a normal Romberg Test. Gait normal.   Reflex Scores:       Tricep reflexes are 2+ on the right side and 2+ on the left side.       Bicep reflexes are 2+ on the right side and 2+ on the left side.       Brachioradialis reflexes are 2+ on the right side and 2+ on the left side.       Patellar reflexes are 3+ on the right side and 3+ on the left side.       Achilles reflexes are 1+ on the right side and 1+ on the left side.  Skin: Skin is warm and dry.   Psychiatric: He has a normal mood and affect. His speech is normal and behavior is normal. Judgment and thought content normal.   Nursing note and vitals reviewed.      Vital Signs  Pulse: (!) 59  BP: (!) 156/69  Pain Score:   4  Pain Loc: Neck  Height and Weight  Height: 6' 2" (188 cm)  Weight: 84.7 kg (186 lb 11.7 oz)  BSA (Calculated - sq m): 2.1 sq meters  BMI (Calculated): 24  Weight in (lb) to have BMI = 25: 194.3]    Provider dictation:  I reviewed the imaging. 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 There is no spinal cord compression or significant central stenosis. There is bilateral foraminal stenosis at C7-T1, C6-C7 and C5-C6. The stenosis is perhaps slightly more severe on the left.     He complains of left-sided neck pain radiating to his left scapula, to the " medial aspect of his arm into his elbow and then numbness and paresthesias involving all fingers, but perhaps more so fingers 3-5. The pain and radiation is consistently related to neck position and is exacerbated by turning his head to the left.     On exam, he has no weakness or dermatomal numbness. He has negative Tinel's at the elbows and wrists bilaterally. He has diffusely brisk but symmetric DTRs with no august myelopathy.    I suspect that his pain is due to left C7 or C8 radiculopathy. I have recommended he continue PT, which he states has been helping, and I will refer him to Dr Ibanez for a cervical DARLENE. We will have him follow up with Jesica Simental in 3 months. If he is still symptomatic at that time, an EMG/NCT of the upper extremities would be useful to help differentiate between C7 or C8 radiculopathy and ulnar neuropathy.    Visit Diagnosis:  Cervical radiculopathy

## 2018-03-27 NOTE — PROGRESS NOTES
Refill Authorization Note     is requesting a refill authorization.    Brief assessment and rationale for refill: APPROVE: needs labs  Amount/Quantity of medication ordered: 90d         Refills Authorized: Yes  If authorized number of refills: 0        Medication-related problems identified: Requires labs  Medication Therapy Plan: GERD not commented on recently; kapil 3 more; will order mag to be ordered at another time   Name and strength of medication: PANTOPRAZOLE SODIUM 40 MG Tablet Delayed Release  How patient will take medication: t1t po daily   Medication reconciliation completed: No  Comments:   Lab Results   Component Value Date    WBC 7.33 11/06/2017    HGB 16.0 11/06/2017    HCT 46.5 11/06/2017    MCV 93 11/06/2017     11/06/2017    No results found for: MG

## 2018-03-28 RX ORDER — PANTOPRAZOLE SODIUM 40 MG/1
TABLET, DELAYED RELEASE ORAL
Qty: 90 TABLET | Refills: 0 | Status: SHIPPED | OUTPATIENT
Start: 2018-03-28 | End: 2018-05-30 | Stop reason: SDUPTHER

## 2018-03-29 ENCOUNTER — TELEPHONE (OUTPATIENT)
Dept: PAIN MEDICINE | Facility: CLINIC | Age: 78
End: 2018-03-29

## 2018-03-29 ENCOUNTER — TELEPHONE (OUTPATIENT)
Dept: NEUROSURGERY | Facility: CLINIC | Age: 78
End: 2018-03-29

## 2018-03-29 NOTE — TELEPHONE ENCOUNTER
Pt was calling to inquire about if pain management would call him or if he needed to call. I informed that pain management would call him to schedule appt. Pt verbalized understanding.     ----- Message from Myrtle Yin sent at 3/29/2018  9:43 AM CDT -----  Contact: Patient  Patient would like to speak with someone regarding the pain management doctor that the doctor was going to refer him to.  Please call patient to discuss.  Call Back#[phone]  Thanks

## 2018-03-29 NOTE — TELEPHONE ENCOUNTER
----- Message from Carie Barnes LPN sent at 3/29/2018 10:12 AM CDT -----  Dr. Koch is requesting for pt to see Dr. Ibanez for Cervical DARLENE. Please call pt and schedule appt. Thanks in advance.

## 2018-04-02 DIAGNOSIS — M54.12 CERVICAL RADICULOPATHY: Primary | ICD-10-CM

## 2018-04-02 RX ORDER — SODIUM CHLORIDE, SODIUM LACTATE, POTASSIUM CHLORIDE, CALCIUM CHLORIDE 600; 310; 30; 20 MG/100ML; MG/100ML; MG/100ML; MG/100ML
INJECTION, SOLUTION INTRAVENOUS CONTINUOUS
Status: CANCELLED | OUTPATIENT
Start: 2018-04-30

## 2018-04-02 NOTE — TELEPHONE ENCOUNTER
Spoke with patient. Cervical steroid injection scheduled for April 30th. Patient advised to follow up with Dr. Barba.

## 2018-04-26 DIAGNOSIS — M50.30 DDD (DEGENERATIVE DISC DISEASE), CERVICAL: Primary | ICD-10-CM

## 2018-04-30 ENCOUNTER — HOSPITAL ENCOUNTER (OUTPATIENT)
Dept: RADIOLOGY | Facility: HOSPITAL | Age: 78
Discharge: HOME | End: 2018-04-30
Attending: ANESTHESIOLOGY | Admitting: ANESTHESIOLOGY
Payer: MEDICARE

## 2018-04-30 ENCOUNTER — HOSPITAL ENCOUNTER (OUTPATIENT)
Facility: HOSPITAL | Age: 78
Discharge: HOME | End: 2018-04-30
Attending: ANESTHESIOLOGY | Admitting: ANESTHESIOLOGY
Payer: MEDICARE

## 2018-04-30 ENCOUNTER — SURGERY (OUTPATIENT)
Age: 78
End: 2018-04-30

## 2018-04-30 VITALS
HEART RATE: 55 BPM | TEMPERATURE: 98 F | OXYGEN SATURATION: 98 % | SYSTOLIC BLOOD PRESSURE: 133 MMHG | DIASTOLIC BLOOD PRESSURE: 66 MMHG | RESPIRATION RATE: 16 BRPM

## 2018-04-30 DIAGNOSIS — M50.30 DDD (DEGENERATIVE DISC DISEASE), CERVICAL: ICD-10-CM

## 2018-04-30 DIAGNOSIS — M54.12 CERVICAL RADICULOPATHY: Primary | ICD-10-CM

## 2018-04-30 PROCEDURE — 62321 NJX INTERLAMINAR CRV/THRC: CPT | Mod: PO | Performed by: ANESTHESIOLOGY

## 2018-04-30 PROCEDURE — 25500020 PHARM REV CODE 255: Mod: PO | Performed by: ANESTHESIOLOGY

## 2018-04-30 PROCEDURE — 63600175 PHARM REV CODE 636 W HCPCS: Mod: PO | Performed by: ANESTHESIOLOGY

## 2018-04-30 PROCEDURE — 99152 MOD SED SAME PHYS/QHP 5/>YRS: CPT | Mod: ,,, | Performed by: ANESTHESIOLOGY

## 2018-04-30 PROCEDURE — 76000 FLUOROSCOPY <1 HR PHYS/QHP: CPT | Mod: TC,PO

## 2018-04-30 PROCEDURE — 62321 NJX INTERLAMINAR CRV/THRC: CPT | Mod: ,,, | Performed by: ANESTHESIOLOGY

## 2018-04-30 PROCEDURE — A4216 STERILE WATER/SALINE, 10 ML: HCPCS | Mod: PO | Performed by: ANESTHESIOLOGY

## 2018-04-30 PROCEDURE — 25000003 PHARM REV CODE 250: Mod: PO | Performed by: ANESTHESIOLOGY

## 2018-04-30 RX ORDER — MIDAZOLAM HYDROCHLORIDE 2 MG/2ML
INJECTION, SOLUTION INTRAMUSCULAR; INTRAVENOUS
Status: DISCONTINUED | OUTPATIENT
Start: 2018-04-30 | End: 2018-04-30 | Stop reason: HOSPADM

## 2018-04-30 RX ORDER — METHYLPREDNISOLONE ACETATE 80 MG/ML
INJECTION, SUSPENSION INTRA-ARTICULAR; INTRALESIONAL; INTRAMUSCULAR; SOFT TISSUE
Status: DISCONTINUED | OUTPATIENT
Start: 2018-04-30 | End: 2018-04-30 | Stop reason: HOSPADM

## 2018-04-30 RX ORDER — SODIUM CHLORIDE, SODIUM LACTATE, POTASSIUM CHLORIDE, CALCIUM CHLORIDE 600; 310; 30; 20 MG/100ML; MG/100ML; MG/100ML; MG/100ML
INJECTION, SOLUTION INTRAVENOUS CONTINUOUS
Status: DISCONTINUED | OUTPATIENT
Start: 2018-04-30 | End: 2018-04-30 | Stop reason: HOSPADM

## 2018-04-30 RX ORDER — LIDOCAINE HYDROCHLORIDE 10 MG/ML
INJECTION, SOLUTION EPIDURAL; INFILTRATION; INTRACAUDAL; PERINEURAL
Status: DISCONTINUED | OUTPATIENT
Start: 2018-04-30 | End: 2018-04-30 | Stop reason: HOSPADM

## 2018-04-30 RX ORDER — SODIUM CHLORIDE 9 MG/ML
INJECTION, SOLUTION INTRAMUSCULAR; INTRAVENOUS; SUBCUTANEOUS
Status: DISCONTINUED | OUTPATIENT
Start: 2018-04-30 | End: 2018-04-30 | Stop reason: HOSPADM

## 2018-04-30 RX ADMIN — SODIUM CHLORIDE, SODIUM LACTATE, POTASSIUM CHLORIDE, CALCIUM CHLORIDE: 600; 310; 30; 20 INJECTION, SOLUTION INTRAVENOUS at 01:04

## 2018-04-30 RX ADMIN — LIDOCAINE HYDROCHLORIDE 5 ML: 10 INJECTION, SOLUTION EPIDURAL; INFILTRATION; INTRACAUDAL; PERINEURAL at 01:04

## 2018-04-30 RX ADMIN — METHYLPREDNISOLONE ACETATE 80 MG: 80 INJECTION, SUSPENSION INTRA-ARTICULAR; INTRALESIONAL; INTRAMUSCULAR; SOFT TISSUE at 01:04

## 2018-04-30 RX ADMIN — IOHEXOL 2 ML: 300 INJECTION, SOLUTION INTRAVENOUS at 01:04

## 2018-04-30 RX ADMIN — MIDAZOLAM HYDROCHLORIDE 2 MG: 1 INJECTION, SOLUTION INTRAMUSCULAR; INTRAVENOUS at 01:04

## 2018-04-30 RX ADMIN — SODIUM CHLORIDE 4 ML: 9 INJECTION INTRAMUSCULAR; INTRAVENOUS; SUBCUTANEOUS at 01:04

## 2018-04-30 NOTE — DISCHARGE SUMMARY
[facility]  Discharge Note  Short Stay    Admit Date: 4/30/2018    Discharge Date: 4/30/2018    Attending Physician: Keny Ibanez MD     Discharge Provider: Keny Ibanez    Diagnoses:  Active Hospital Problems    Diagnosis  POA    *Cervical radiculopathy [M54.12]  Yes      Resolved Hospital Problems    Diagnosis Date Resolved POA   No resolved problems to display.       Discharged Condition: good    Final Diagnoses: Cervical radiculopathy [M54.12]    Disposition: Home or Self Care    Hospital Course: no complications, uneventful    Outcome of Hospitalization, Treatment, Procedure, or Surgery:  Patient was admitted for outpatient procedure. The patient underwent procedure without complications and are discharged home    Follow up/Patient Instructions:  Follow up as scheduled/Patient has received instructions and follow up date    Medications:  Continue previous medications      Discharge Procedure Orders  Call MD for:  temperature >100.4     Call MD for:  severe uncontrolled pain     Call MD for:  redness, tenderness, or signs of infection (pain, swelling, redness, odor or green/yellow discharge around incision site)     Call MD for:  severe persistent headache     No dressing needed           Discharge Procedure Orders (must include Diet, Follow-up, Activity):    Discharge Procedure Orders (must include Diet, Follow-up, Activity)  Call MD for:  temperature >100.4     Call MD for:  severe uncontrolled pain     Call MD for:  redness, tenderness, or signs of infection (pain, swelling, redness, odor or green/yellow discharge around incision site)     Call MD for:  severe persistent headache     No dressing needed

## 2018-04-30 NOTE — OP NOTE
PROCEDURE DATE: 4/30/2018    Procedure: C7-T1 cervical interlaminar epidural steroid injection under utilizing fluoroscopy.    Diagnosis: Cervical Radiculopathy    POSTOP DIAGNOSIS: SAME    Physician: Keny Ibanez MD    Medications injected:  Methylprednisone 80mg followed by a slow injection of 4 mL sterile, preservative-free normal saline.    Local anesthetic used: Lidocaine 1%, 4 ml.    Sedation Medications: 2mg versed    Complications:  none    Estimated blood loss: none    Technique:  A time-out was taken to identify patient and procedure prior to starting the procedure.  With the patient laying in a prone position with the neck in a mid-flexed forward position, the area was prepped and draped in the usual sterile fashion using ChloraPrep and a fenestrated drape.  The area was determined under AP fluoroscopic guidance.  Local anesthetic was given using a 25-gauge 1.5 inch needle by raising a wheal and then infiltrating ventrally.  A 3.5 inch 20-gauge Touhy needle was introduced under fluoroscopic guidance to meet the lamina of C7.  The needle was then hinged under the lamina then advanced using loss of resistance technique.  Once the tip of the needle was in the desired position, the contrast dye Omnipaque was injected to determine placement and no uptake.  The steroid was then injected slowly followed by a slow injection of 4 mL of the sterile preservative-free normal saline.  The patient tolerated the procedure well.    The patient was monitored after the procedure and was given post-procedure and discharge instructions to follow at home. The patient was discharged in a stable condition.

## 2018-04-30 NOTE — H&P
CC: Neck pain    HPI: The patient is a 78yo man with a history of cervical radiculopathy here for cervical DARLENE. There are no major changes in history and physical from 3/27/18 by Dr. Koch.    Past Medical History:   Diagnosis Date    Arthritis     Cataract     OU    Colon polyps     Diabetes     Diabetes mellitus, type 2     Gout     HTN (hypertension)     Hyperlipidemia LDL goal <100     Impotence     Melanoma     Left forearm, s/p excision       Past Surgical History:   Procedure Laterality Date    cancer of skin (nose)  2/2014    basal cell ca    CIRCUMCISION, PRIMARY      COLONOSCOPY  7/31/2009  [facility]    Normal colon and TI.    COLONOSCOPY W/ POLYPECTOMY  8/7/2006  Radhames    Three cecal polyps, largest 4mm x 12mm.  TUBULAR ADENOMAS.  One 2 mm in the hepatic flexure.  TUBULAR ADENOMAS.    [facility]    removal of lipoma      R arm    removal of melanoma  2009    L arm    TONSILLECTOMY         Family History   Problem Relation Age of Onset    Diabetes Mother     Heart disease Brother 56     mi    Cancer Brother      Prostate    Heart attack Brother     Cancer Father      lymphoma    Hypertension Father     Nephrolithiasis Father        Social History     Social History    Marital status:      Spouse name: N/A    Number of children: N/A    Years of education: N/A     Social History Main Topics    Smoking status: Current Some Day Smoker     Types: Cigars    Smokeless tobacco: Never Used      Comment: smokes a cigar on occasion    Alcohol use Yes    Drug use: No    Sexual activity: Yes     Partners: Female     Other Topics Concern    None     Social History Narrative    None       No current facility-administered medications for this encounter.        Review of patient's allergies indicates:   Allergen Reactions    No known drug allergies        Vitals:    04/30/18 1303   BP: (!) 159/72   Pulse: (!) 55       REVIEW OF SYSTEMS:     GENERAL:  No weight loss, malaise or fevers.  HEENT:  No recent changes in vision or hearing  NECK: Negative for lumps, no difficulty with swallowing.  RESPIRATORY: Negative for cough, wheezing or shortness of breath, patient denies any recent URI.  CARDIOVASCULAR: Negative for chest pain, leg swelling or palpitations.  GI: Negative for abdominal discomfort, blood in stools or black stools or change in bowel habits.  MUSCULOSKELETAL: See HPI.  SKIN: Negative for lesions, rash, and itching.  PSYCH: No suicidal or homicidal ideations, no current mood disturbances.  HEMATOLOGY/LYMPHOLOGY: Negative for prolonged bleeding, bruising easily or swollen nodes. Patient is not currently taking any anti-coagulants  ENDO: No history of diabetes or thyroid dysfunction  NEURO: No history of syncope, paralysis, seizures or tremors.All other reviewed and negative other than HPI.    Physical exam:  Gen: A and O x3, pleasant, well-groomed  Skin: No rashes or obvious lesions  HEENT: PERRLA, no obvious deformities on ears or in canals. No thyroid masses, trachea midline, no palpable lymph nodes in neck, axilla.  CVS: Regular rate and rhythm, normal S1 and S2, no murmurs.  Resp: Clear to auscultation bilaterally.  Abdomen: Soft, NT/ND, normal bowel sounds present.  Musculoskeletal/Neuro: Moving all extremities    Assessment:  Cervical radiculopathy  -     Case Request Operating Room: INJECTION-STEROID-EPIDURAL-CERVICAL  -     Activity as tolerated; Standing  -     Place in Outpatient; Standing  -     Diet NPO; Standing  -     lactated ringers infusion; Inject into the vein continuous.  -     Notify physician ; Standing  -     Notify physician ; Standing  -     Notify physician (specify); Standing  -     Place 18-22 gaua peripheral IV ; Standing  -     Verify informed consent; Standing  -     Vital signs; Standing

## 2018-04-30 NOTE — DISCHARGE INSTRUCTIONS
Home care instructions  Apply ice pack to the injection site for 20 minutes periods for the first 24 hrs for soreness/discomfort at injection site DO NOT USE HEAT FOR 24 HOURS  Keep site clean and dry for 24 hours, remove bandaid when desired  Do not drive until tomorrow  Take care when walking after a lumbar injection  Avoid strenuous activities for 2 days  Make take 2 weeks to feel the full effects   Resume home medication as prescribed today  Resume Aspirin, Plavix, or Coumadin the day after the procedure unless otherwise instructed.    SEE IMMEDIATE MEDICAL HELP FOR:  Severe increase in your usual pain or appearance of new pain  Prolonged or increasing weakness or numbness in the legs or arms  Drainage, redness, active bleeding, or increased swelling at the injection site  Temperature over 100.0 degrees F.  Headache that increases when your head is upright and decreases when you lie flat    CALL 911 OR GO DIRECTLY TO EMERGENCY DEPARTMENT FOR:  Shortness of breath, chest pain, or problems breathing        Recovery After Procedural Sedation (Adult)  You have been given medicine by vein to make you sleep during your surgery. This may have included both a pain medicine and sleeping medicine. Most of the effects have worn off. But you may still have some drowsiness for the next 6 to 8 hours.  Home care  Follow these guidelines when you get home:  · For the next 8 hours, you should be watched by a responsible adult. This person should make sure your condition is not getting worse.  · Don't drink any alcohol for the next 24 hours.  · Don't drive, operate dangerous machinery, or make important business or personal decisions during the next 24 hours.  Note: Your healthcare provider may tell you not to take any medicine by mouth for pain or sleep in the next 4 hours. These medicines may react with the medicines you were given in the hospital. This could cause a much stronger response than usual.  Follow-up care  Follow up  with your healthcare provider if you are not alert and back to your usual level of activity within 12 hours.  When to seek medical advice  Call your healthcare provider right away if any of these occur:  · Drowsiness gets worse  · Weakness or dizziness gets worse  · Repeated vomiting  · You can't be awakened   Date Last Reviewed: 10/18/2016  © 8253-5460 [employer]. [address] [place] [address], [place] [address]. All rights reserved. This information is not intended as a substitute for professional medical care. Always follow your healthcare professional's instructions.

## 2018-05-01 ENCOUNTER — TELEPHONE (OUTPATIENT)
Dept: PAIN MEDICINE | Facility: CLINIC | Age: 78
End: 2018-05-01

## 2018-05-01 NOTE — TELEPHONE ENCOUNTER
----- Message from Mary Khan sent at 5/1/2018  8:17 AM CDT -----  Type:  Sooner Apoointment Request    Caller is requesting a sooner appointment.  Caller declined first available appointment listed below.  Caller will not accept being placed on the waitlist and is requesting a message be sent to doctor.    Name of Caller: Carolann  When is the first available appointment?  5/29/18  Symptoms:  Patient was advised to book a two to three week follow up appointment for 4/30/18 injections  Best Call Back Number:  [phone] home or cell [phone]  Additional Information: Calendar is open for the rest of May.

## 2018-05-11 RX ORDER — AMLODIPINE BESYLATE 10 MG/1
TABLET ORAL
Qty: 90 TABLET | Refills: 0 | Status: SHIPPED | OUTPATIENT
Start: 2018-05-11 | End: 2018-07-16 | Stop reason: SDUPTHER

## 2018-05-11 NOTE — PROGRESS NOTES
Refill Authorization Note     is requesting a refill authorization.    Brief assessment and rationale for refill: APPROVE: needs annual  Amount/Quantity of medication ordered: 90d         Refills Authorized: Yes  If authorized number of refills: 0        Medication-related problems identified: Requires appointment  Medication Therapy Plan: Bp elevated; kapil 3 more  Name and strength of medication: AMLODIPINE BESYLATE 10 MG Tablet  How patient will take medication: Daily   Medication reconciliation completed: No  Comments:   BP Readings from Last 3 Encounters:   04/30/18 133/66   04/04/18 (!) 144/70   03/27/18 (!) 156/69

## 2018-05-14 NOTE — TELEPHONE ENCOUNTER
Spoke with patient to make annual appt and he states he will call back- he is driving at the moment.

## 2018-05-17 ENCOUNTER — TELEPHONE (OUTPATIENT)
Dept: INTERNAL MEDICINE | Facility: CLINIC | Age: 78
End: 2018-05-17

## 2018-05-17 NOTE — TELEPHONE ENCOUNTER
----- Message from Candace Powers sent at 5/17/2018 12:22 PM CDT -----  Contact: [phone]/PT  Calling TO speak with nurse regarding appt that was offered to him earlier part of the week. Pt states he changed physicians

## 2018-05-17 NOTE — TELEPHONE ENCOUNTER
Patient returned call to clinic to inform that he has changed PCP's and will not longer be seeing Dr Wells.     Dr Wells removed from patient care team, patient advised to call if there was ever anything he needed int he future. Thanked us for our help, and voiced understanding.

## 2018-05-17 NOTE — TELEPHONE ENCOUNTER
----- Message from Candace Powers sent at 5/17/2018 12:22 PM CDT -----  Contact: [phone]/PT  Calling TO speak with nurse regarding appt that was offered to him earlier part of the week. Pt states he changed physicians

## 2018-05-30 DIAGNOSIS — E11.8 TYPE 2 DIABETES MELLITUS WITH COMPLICATION, WITHOUT LONG-TERM CURRENT USE OF INSULIN: ICD-10-CM

## 2018-05-30 DIAGNOSIS — K21.9 GASTROESOPHAGEAL REFLUX DISEASE, ESOPHAGITIS PRESENCE NOT SPECIFIED: ICD-10-CM

## 2018-05-30 DIAGNOSIS — Z51.81 ENCOUNTER FOR MONITORING LONG-TERM PROTON PUMP INHIBITOR THERAPY: Primary | ICD-10-CM

## 2018-05-30 DIAGNOSIS — Z79.899 ENCOUNTER FOR MONITORING LONG-TERM PROTON PUMP INHIBITOR THERAPY: Primary | ICD-10-CM

## 2018-06-01 RX ORDER — PANTOPRAZOLE SODIUM 40 MG/1
TABLET, DELAYED RELEASE ORAL
Qty: 90 TABLET | Refills: 0 | Status: SHIPPED | OUTPATIENT
Start: 2018-06-01

## 2018-06-01 NOTE — PROGRESS NOTES
Refill Authorization Note     is requesting a refill authorization.    Brief assessment and rationale for refill: APPROVE; needs labs and annual  Amount/Quantity of medication ordered: 90d         Refills Authorized: Yes  If authorized number of refills: 0        Medication-related problems identified:   Requires appointment  Requires labs  Medication Therapy Plan: Kimo 3 more; needs a1c and mag/b12; kimo 3 more  Name and strength of medication: PANTOPRAZOLE SODIUM 40 MG Tablet Delayed Release  How patient will take medication: t1t po daily   Medication reconciliation completed: No  Comments:   Lab Results   Component Value Date    WBC 7.33 11/06/2017    RBC 5.02 11/06/2017    HCT 46.5 11/06/2017    MCV 93 11/06/2017     11/06/2017     No results found for: MG  No results found for: [id number]

## 2018-06-18 ENCOUNTER — OFFICE VISIT (OUTPATIENT)
Dept: NEUROSURGERY | Facility: CLINIC | Age: 78
End: 2018-06-18
Payer: MEDICARE

## 2018-06-18 VITALS
BODY MASS INDEX: 23.67 KG/M2 | SYSTOLIC BLOOD PRESSURE: 143 MMHG | DIASTOLIC BLOOD PRESSURE: 69 MMHG | WEIGHT: 184.44 LBS | HEART RATE: 63 BPM | HEIGHT: 74 IN

## 2018-06-18 DIAGNOSIS — M54.12 CERVICAL RADICULOPATHY: Primary | ICD-10-CM

## 2018-06-18 PROCEDURE — 3078F DIAST BP <80 MM HG: CPT | Mod: CPTII,S$GLB,, | Performed by: PHYSICIAN ASSISTANT

## 2018-06-18 PROCEDURE — 99999 PR PBB SHADOW E&M-EST. PATIENT-LVL III: CPT | Mod: PBBFAC,,, | Performed by: PHYSICIAN ASSISTANT

## 2018-06-18 PROCEDURE — 99214 OFFICE O/P EST MOD 30 MIN: CPT | Mod: S$GLB,,, | Performed by: PHYSICIAN ASSISTANT

## 2018-06-18 PROCEDURE — 3077F SYST BP >= 140 MM HG: CPT | Mod: CPTII,S$GLB,, | Performed by: PHYSICIAN ASSISTANT

## 2018-06-18 NOTE — PROGRESS NOTES
Neurosurgery History & Physical    Patient ID: Carolann Waite is a 77 y.o. male.    Chief Complaint   Patient presents with    Follow-up     3 month follow up cervical radiculopathy. The pain, numbness, and tingling noted to LUE has resolved since pt has completed PT. Pt still has c/o neck pain and logan shoulder pain. Occasional headaches. DARLENE with Dr. Ibanez on 4/30. Pt is unsure if it helped with pain.       Review of Systems   Constitutional: Negative for chills, diaphoresis, fatigue and fever.   HENT: Negative for congestion, hearing loss, rhinorrhea and sore throat.    Eyes: Negative for photophobia, pain, redness and visual disturbance.   Respiratory: Negative for cough, chest tightness, shortness of breath and wheezing.    Cardiovascular: Negative for chest pain, palpitations and leg swelling.   Gastrointestinal: Negative for abdominal distention, abdominal pain, constipation, diarrhea, nausea and vomiting.   Genitourinary: Negative for difficulty urinating, dysuria, frequency, hematuria and urgency.   Musculoskeletal: Positive for neck pain. Negative for back pain, gait problem, myalgias and neck stiffness.   Skin: Negative for pallor and rash.   Neurological: Negative for dizziness, seizures, speech difficulty, weakness, light-headedness, numbness and headaches.   Psychiatric/Behavioral: Negative for confusion, hallucinations and sleep disturbance.       Past Medical History:   Diagnosis Date    Arthritis     Cataract     OU    Colon polyps     Diabetes     Diabetes mellitus, type 2     Gout     HTN (hypertension)     Hyperlipidemia LDL goal <100     Impotence     Melanoma     Left forearm, s/p excision     Social History     Social History    Marital status:      Spouse name: N/A    Number of children: N/A    Years of education: N/A     Occupational History    Not on file.     Social History Main Topics    Smoking status: Current Some Day Smoker     Types: Cigars    Smokeless  tobacco: Never Used      Comment: smokes a cigar on occasion    Alcohol use Yes    Drug use: No    Sexual activity: Yes     Partners: Female     Other Topics Concern    Not on file     Social History Narrative    No narrative on file     Family History   Problem Relation Age of Onset    Diabetes Mother     Heart disease Brother 56        mi    Cancer Brother         Prostate    Heart attack Brother     Cancer Father         lymphoma    Hypertension Father     Nephrolithiasis Father      Review of patient's allergies indicates:   Allergen Reactions    No known drug allergies        Current Outpatient Prescriptions:     ACCU-CHEK MILENA PLUS METER Misc, USE AS DIRECTED, Disp: 1 each, Rfl: 1    ACCU-CHEK MILENA PLUS TEST STRP Strp, CHECK BLOOD SUGAR TWICE DAILY, Disp: 200 strip, Rfl: 11    ACCU-CHEK SOFTCLIX LANCETS Misc, , Disp: , Rfl:     acyclovir (ZOVIRAX) 400 MG tablet, , Disp: , Rfl: 1    amLODIPine (NORVASC) 10 MG tablet, TAKE 1 TABLET EVERY DAY, Disp: 90 tablet, Rfl: 0    atorvastatin (LIPITOR) 20 MG tablet, TAKE 1 TABLET EVERY DAY, Disp: 90 tablet, Rfl: 2    azelastine (OPTIVAR) 0.05 % ophthalmic solution, , Disp: , Rfl:     etodolac (LODINE) 200 MG Cap, Take 1 capsule (200 mg total) by mouth every 8 (eight) hours as needed. To use for gout attack related pain instead of indocin., Disp: 90 capsule, Rfl: 1    fexofenadine (ALLEGRA) 180 MG tablet, Take 1 tablet by mouth daily as needed. , Disp: , Rfl:     fluticasone (FLONASE) 50 mcg/actuation nasal spray, 2 sprays by Nasal route daily as needed. 1 - 2 Spray(s) Nasal daily., Disp: 16 g, Rfl: 2    gabapentin (NEURONTIN) 300 MG capsule, Take 300 mg by mouth once daily., Disp: , Rfl:     metformin (GLUCOPHAGE) 500 MG tablet, TAKE 1 TABLET TWICE DAILY WITH MEALS, Disp: 180 tablet, Rfl: 3    pantoprazole (PROTONIX) 40 MG tablet, TAKE 1 TABLET EVERY DAY, Disp: 90 tablet, Rfl: 0    triamcinolone acetonide 0.1% (KENALOG) 0.1 % cream, , Disp: ,  "Rfl:     valsartan (DIOVAN) 320 MG tablet, TAKE 1 TABLET EVERY DAY, Disp: 90 tablet, Rfl: 2  Blood pressure (!) 143/69, pulse 63, height 6' 2" (1.88 m), weight 83.7 kg (184 lb 6.6 oz).      Neurologic Exam  Mental Status   Oriented to person, place, and time.   Attention: normal. Concentration: normal.   Speech: speech is normal   Level of consciousness: alert  Knowledge: good.      Cranial Nerves      CN II   Visual acuity: normal     CN III, IV, VI   Pupils are equal, round, and reactive to light.  Extraocular motions are normal.      CN V   Facial sensation intact.      CN VII   Facial expression full, symmetric.      CN VIII   Hearing: intact     CN IX, X   Palate: symmetric     CN XI   CN XI normal.      CN XII   CN XII normal.      Motor Exam   Muscle bulk: normal  Overall muscle tone: normal  Right arm pronator drift: absent  Left arm pronator drift: absent     Strength   Right deltoid: 5/5  Left deltoid: 5/5  Right biceps: 5/5  Left biceps: 5/5  Right triceps: 5/5  Left triceps: 5/5  Right wrist flexion: 5/5  Left wrist flexion: 5/5  Right wrist extension: 5/5  Left wrist extension: 5/5  Right interossei: 5/5  Left interossei: 5/5  Right iliopsoas: 5/5  Left iliopsoas: 5/5  Right quadriceps: 5/5  Left quadriceps: 5/5  Right hamstrin/5  Left hamstrin/5  Right anterior tibial: 5/5  Left anterior tibial: 5/5  Right posterior tibial: 5/5  Left posterior tibial: 5/5  Right peroneal: 5/5  Left peroneal: 5/5  Right gastroc: 5/5  Left gastroc: 5/5     Sensory Exam   Light touch normal.      Gait, Coordination, and Reflexes      Gait  Gait: normal     Coordination   Romberg: negative  Finger to nose coordination: normal     Tremor   Resting tremor: absent     Reflexes   Right brachioradialis: 2+  Left brachioradialis: 2+  Right biceps: 2+  Left biceps: 2+  Right triceps: 2+  Left triceps: 2+  Right patellar: 3+  Left patellar: 3+  Right achilles: 1+  Left achilles: 1+  Right plantar: normal  Left plantar: " normal  Right Arriaga: absent  Left Arriaga: absent  Right ankle clonus: absent   Left ankle clonus: absent     Physical Exam  Constitutional: He is oriented to person, place, and time. He appears well-developed and well-nourished.   HENT:   Head: Normocephalic and atraumatic.   Eyes: EOM are normal. Pupils are equal, round, and reactive to light.   Neck: Normal range of motion. Neck supple.   Cardiovascular: Normal rate and regular rhythm.    Pulmonary/Chest: Effort normal.   Abdominal: Soft.   Musculoskeletal: Normal range of motion.   Neurological: He is alert and oriented to person, place, and time. He has a normal Finger-Nose-Finger Test and a normal Romberg Test. Gait normal.   Reflex Scores:       Tricep reflexes are 2+ on the right side and 2+ on the left side.       Bicep reflexes are 2+ on the right side and 2+ on the left side.       Brachioradialis reflexes are 2+ on the right side and 2+ on the left side.       Patellar reflexes are 3+ on the right side and 3+ on the left side.       Achilles reflexes are 1+ on the right side and 1+ on the left side.  Skin: Skin is warm and dry.   Psychiatric: He has a normal mood and affect. His speech is normal and behavior is normal. Judgment and thought content normal.   Nursing note and vitals reviewed.    Oswestry Disability Index score: 16%    Patient Health Questionnaire score: 2    Provider dictation:    Mr. Waite is a 77 year old male who presents for neurosurgical follow-up. Patient was seen by Dr. Koch on 3/27/2018 for neck and left arm pain. He complained of left-sided neck pain radiating to his left scapula, to the medial aspect of his arm into his elbow and then numbness and paresthesias involving all fingers, but perhaps more so fingers 3-5. He has completed 12 weeks of PT and underwent a C7-T1 interlaminar DARLENE with Dr. Ibanez on 4/30/18. He reports resolution of left arm pain and numbness following completion of PT. He denies any significant  improvment since the DARLENE. He currently complains of continued posterior neck pain and shoulder pain with occasional headaches.     Imaging shows multilevel spondylotic disease and facet degeneration. 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 There is no spinal cord compression or significant central stenosis. There is bilateral foraminal stenosis at C7-T1, C6-C7 and C5-C6. The stenosis is perhaps slightly more severe on the left.    On exam, he has no weakness or dermatomal numbness. He has negative Tinel's at the elbows and wrists bilaterally. He has diffusely brisk but symmetric DTRs with no august myelopathy.    Mr. Waite has improved significantly following PT. I am sending another referral for him to continue PT at [facility] as requested since this provided good relief. We will send a message to Dr. Ibanez's office to assure patient has a scheduled follow-up since the DARLENE. He can follow-up with us on a prn basis. If his symptoms return we will obtain an EMG/NCT of the upper extremities to help differentiate between C7 or C8 radiculopathy and ulnar neuropathy. He was informed to call the clinic with any further questions or concerns.     1. Cervical radiculopathy

## 2018-06-20 ENCOUNTER — TELEPHONE (OUTPATIENT)
Dept: PAIN MEDICINE | Facility: CLINIC | Age: 78
End: 2018-06-20

## 2018-06-20 NOTE — TELEPHONE ENCOUNTER
----- Message from Carie Barnes LPN sent at 6/20/2018  1:58 PM CDT -----  Dr. Koch is requesting that pt f/u with Dr. Ibanez. Please call pt to schedule appt. Thanks in advance.

## 2018-07-11 ENCOUNTER — OFFICE VISIT (OUTPATIENT)
Dept: FAMILY MEDICINE | Facility: CLINIC | Age: 78
End: 2018-07-11
Payer: MEDICARE

## 2018-07-11 VITALS
SYSTOLIC BLOOD PRESSURE: 132 MMHG | OXYGEN SATURATION: 97 % | HEART RATE: 57 BPM | HEIGHT: 74 IN | WEIGHT: 183 LBS | DIASTOLIC BLOOD PRESSURE: 66 MMHG | BODY MASS INDEX: 23.49 KG/M2

## 2018-07-11 DIAGNOSIS — E11.49 TYPE 2 DIABETES MELLITUS WITH NEUROLOGICAL MANIFESTATIONS: ICD-10-CM

## 2018-07-11 DIAGNOSIS — Z00.00 ENCOUNTER FOR PREVENTIVE HEALTH EXAMINATION: Primary | ICD-10-CM

## 2018-07-11 DIAGNOSIS — Z86.010 HISTORY OF COLON POLYPS: ICD-10-CM

## 2018-07-11 DIAGNOSIS — M54.12 CERVICAL RADICULOPATHY: ICD-10-CM

## 2018-07-11 DIAGNOSIS — I10 ESSENTIAL HYPERTENSION: ICD-10-CM

## 2018-07-11 DIAGNOSIS — E78.5 HYPERLIPIDEMIA LDL GOAL <100: ICD-10-CM

## 2018-07-11 DIAGNOSIS — M10.9 GOUT OF FOOT, UNSPECIFIED CAUSE, UNSPECIFIED CHRONICITY, UNSPECIFIED LATERALITY: ICD-10-CM

## 2018-07-11 DIAGNOSIS — K21.9 GASTROESOPHAGEAL REFLUX DISEASE, ESOPHAGITIS PRESENCE NOT SPECIFIED: ICD-10-CM

## 2018-07-11 PROCEDURE — 99999 PR PBB SHADOW E&M-EST. PATIENT-LVL V: CPT | Mod: PBBFAC,,, | Performed by: NURSE PRACTITIONER

## 2018-07-11 PROCEDURE — 3078F DIAST BP <80 MM HG: CPT | Mod: CPTII,S$GLB,, | Performed by: NURSE PRACTITIONER

## 2018-07-11 PROCEDURE — 3044F HG A1C LEVEL LT 7.0%: CPT | Mod: CPTII,S$GLB,, | Performed by: NURSE PRACTITIONER

## 2018-07-11 PROCEDURE — 3075F SYST BP GE 130 - 139MM HG: CPT | Mod: CPTII,S$GLB,, | Performed by: NURSE PRACTITIONER

## 2018-07-11 PROCEDURE — G0439 PPPS, SUBSEQ VISIT: HCPCS | Mod: S$GLB,,, | Performed by: NURSE PRACTITIONER

## 2018-07-11 NOTE — PROGRESS NOTES
"Carolann Waite presented for a  Medicare AWV and comprehensive Health Risk Assessment today. The following components were reviewed and updated:    · Medical history  · Family History  · Social history  · Allergies and Current Medications  · Health Risk Assessment  · Health Maintenance  · Care Team     ** See Completed Assessments for Annual Wellness Visit within the encounter summary.**       The following assessments were completed:  · Living Situation  · CAGE  · Depression Screening  · Timed Get Up and Go  · Whisper Test  · Cognitive Function Screening          · Nutrition Screening  · ADL Screening  · PAQ Screening    Vitals:    07/11/18 0756   BP: 132/66   BP Location: Left arm   Patient Position: Sitting   BP Method: Medium (Manual)   Pulse: (!) 57   SpO2: 97%   Weight: 83 kg (182 lb 15.7 oz)   Height: 6' 2" (1.88 m)     Body mass index is 23.49 kg/m².  Physical Exam   Constitutional: He is oriented to person, place, and time. He appears well-developed. No distress.   HENT:   Head: Normocephalic.   Eyes: Conjunctivae are normal.   Cardiovascular: Normal rate, regular rhythm and normal heart sounds.    Pulmonary/Chest: Effort normal and breath sounds normal. No respiratory distress.   Neurological: He is alert and oriented to person, place, and time. No cranial nerve deficit.   Skin: Skin is warm.   Psychiatric: He has a normal mood and affect. His behavior is normal.         Diagnoses and health risks identified today and associated recommendations/orders:    1. Encounter for preventive health examination  Reviewed health maintenance and provided recommendations   Declines tdap at this time     2. Cervical radiculopathy  Improved with injection  Continue PT   Followed by Nanci.      3. Essential hypertension  Stable.   Controlled on current medications.  Followed by Tito.      4. Hyperlipidemia LDL goal <100  Continue to monitor   Followed by Gabe Panchal MD .      5. 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   Last a1c 5.9  Followed by Gabe Panchal MD .      6. Gastroesophageal reflux disease, esophagitis presence not specified  Stable.     Followed by Gabe Panchal MD .      7. History of colon polyps  Stable.     Followed by Gabe Panchal MD .      8. Gout of foot, unspecified cause, unspecified chronicity, unspecified laterality  Stable.   Continue to monitor uric acid  Followed by Gabe Panchal MD .        Provided Carolann with a 5-10 year written screening schedule and personal prevention plan. Recommendations were developed using the USPSTF age appropriate recommendations. Education, counseling, and referrals were provided as needed. After Visit Summary printed and given to patient which includes a list of additional screenings\tests needed.    Follow-up in about 1 year (around 7/11/2019).    Paige Barrow NP

## 2018-07-11 NOTE — PATIENT INSTRUCTIONS
Counseling and Referral of Other Preventative  (Italic type indicates deductible and co-insurance are waived)    Patient Name: Carolann Waite  Today's Date: 7/11/2018    Health Maintenance       Date Due Completion Date    TETANUS VACCINE 12/13/2017 12/13/2007    Foot Exam 06/07/2018 6/7/2017 (Done)    Override on 6/7/2017: Done    Override on 8/18/2016: Done    Override on 11/5/2015: Done    Influenza Vaccine 08/01/2018 11/16/2016    Hemoglobin A1c 10/04/2018 4/4/2018    Lipid Panel 11/06/2018 11/6/2017    Eye Exam 06/04/2019 6/4/2018 (Done)    Override on 6/4/2018: Done    Low Dose Statin 07/11/2019 7/11/2018    Colonoscopy 10/08/2021 10/8/2014        No orders of the defined types were placed in this encounter.    The following information is provided to all patients.  This information is to help you find resources for any of the problems found today that may be affecting your health:                Living healthy guide: www.[facility].[place].gov      Understanding Diabetes: www.diabetes.org      Eating healthy: www.cdc.gov/healthyweight      CDC home safety checklist: www.cdc.gov/steadi/patient.html      Agency on Aging: www.goea.[place].gov      Alcoholics anonymous (AA): www.aa.org      Physical Activity: www.marleen.nih.gov/lg8ngwj      Tobacco use: www.quitwithusla.org

## 2018-07-17 NOTE — PROGRESS NOTES
Refill Authorization Note     is requesting a refill authorization.    Brief assessment and rationale for refill: APPROVE; pt needs annual  Amount/Quantity of medication ordered: 90d        Refills Authorized: Yes  If authorized number of refills: 0        Medication-related problems identified: Requires appointment  Medication Therapy Plan: Labs WNL; BP controlled; pt needs annual appt, NTBS; approve 3 more   Name and strength of medication: AMLODIPINE BESYLATE 10 MG Tablet  How patient will take medication: t1t qd  Medication reconciliation completed: No  Comments:     BP Readings from Last 3 Encounters:   07/11/18 132/66   06/18/18 (!) 143/69   04/30/18 133/66     Pulse Readings from Last 3 Encounters:   07/11/18 (!) 57   06/18/18 63   04/30/18 (!) 55     Lab Results   Component Value Date    ALT 31 11/06/2017    AST 23 11/06/2017    ALKPHOS 69 11/06/2017    BILITOT 1.1 11/06/2017

## 2018-07-18 RX ORDER — AMLODIPINE BESYLATE 10 MG/1
TABLET ORAL
Qty: 90 TABLET | Refills: 0 | Status: SHIPPED | OUTPATIENT
Start: 2018-07-18

## 2018-07-18 NOTE — TELEPHONE ENCOUNTER
Please schedule patient for annual appointment.  It looks like the pt might be seeing a Dr. Panchal but I'm not sure. Thanks!

## 2018-07-25 ENCOUNTER — PES CALL (OUTPATIENT)
Dept: ADMINISTRATIVE | Facility: CLINIC | Age: 78
End: 2018-07-25

## 2018-07-25 ENCOUNTER — OFFICE VISIT (OUTPATIENT)
Dept: PAIN MEDICINE | Facility: CLINIC | Age: 78
End: 2018-07-25
Payer: MEDICARE

## 2018-07-25 VITALS
HEIGHT: 74 IN | SYSTOLIC BLOOD PRESSURE: 145 MMHG | BODY MASS INDEX: 23.19 KG/M2 | WEIGHT: 180.69 LBS | TEMPERATURE: 97 F | OXYGEN SATURATION: 96 % | HEART RATE: 64 BPM | DIASTOLIC BLOOD PRESSURE: 67 MMHG | RESPIRATION RATE: 20 BRPM

## 2018-07-25 DIAGNOSIS — M47.812 SPONDYLOSIS OF CERVICAL REGION WITHOUT MYELOPATHY OR RADICULOPATHY: ICD-10-CM

## 2018-07-25 DIAGNOSIS — M54.12 CERVICAL RADICULOPATHY: Primary | ICD-10-CM

## 2018-07-25 DIAGNOSIS — M50.30 DDD (DEGENERATIVE DISC DISEASE), CERVICAL: ICD-10-CM

## 2018-07-25 PROCEDURE — 99214 OFFICE O/P EST MOD 30 MIN: CPT | Mod: S$GLB,,, | Performed by: ANESTHESIOLOGY

## 2018-07-25 PROCEDURE — 3077F SYST BP >= 140 MM HG: CPT | Mod: CPTII,S$GLB,, | Performed by: ANESTHESIOLOGY

## 2018-07-25 PROCEDURE — 99999 PR PBB SHADOW E&M-EST. PATIENT-LVL IV: CPT | Mod: PBBFAC,,, | Performed by: ANESTHESIOLOGY

## 2018-07-25 PROCEDURE — 3078F DIAST BP <80 MM HG: CPT | Mod: CPTII,S$GLB,, | Performed by: ANESTHESIOLOGY

## 2018-07-25 RX ORDER — SODIUM CHLORIDE, SODIUM LACTATE, POTASSIUM CHLORIDE, CALCIUM CHLORIDE 600; 310; 30; 20 MG/100ML; MG/100ML; MG/100ML; MG/100ML
INJECTION, SOLUTION INTRAVENOUS CONTINUOUS
Status: CANCELLED | OUTPATIENT
Start: 2018-08-15

## 2018-07-25 NOTE — H&P (VIEW-ONLY)
This note was completed with dictation software and grammatical errors may exist.    CC:Neck pain    HPI: Patient is a 77-year-old man with a history of type 2 diabetes, hypertension, cervical DDD who initially presented in referral from Dr. Koch for neck pain radiating into the left arm.  He was sent for an epidural steroid injection which I performed, C7/T1 DARLENE on 4/30/18 for which she reports complete relief of his left arm pain.  He had been doing physical therapy and continued after the injection, states that he feels the injection may have resolved his arm pain and the physical therapy was also helping with range of motion.  He continues to have neck pain and left scapular pain enough that it is bothersome, he would like to see what else he can do for this.  He denies any more numbness or tingling in his hand, no weakness.  His pain is fairly constant, does not seem to have any major exacerbating or relieving factors.  He does have some decreased range of motion with turning his head laterally but denies any sharp pain when doing this.      ROS: He reports a runny nose, cough, easy bruising, urinary frequency, joint stiffness, back pain and loss of balance.  Balance of review of systems is negative.    Past Medical History:   Diagnosis Date    Arthritis     Cataract     OU    Colon polyps     Diabetes     Diabetes mellitus, type 2     Gout     HTN (hypertension)     Hyperlipidemia LDL goal <100     Impotence     Melanoma     Left forearm, s/p excision       Past Surgical History:   Procedure Laterality Date    cancer of skin (nose)  2/2014    basal cell ca    CIRCUMCISION, PRIMARY      COLONOSCOPY  7/31/2009  Radhames    Normal colon and TI.    COLONOSCOPY W/ POLYPECTOMY  8/7/2006  Radhames    Three cecal polyps, largest 4mm x 12mm.  TUBULAR ADENOMAS.  One 2 mm in the hepatic flexure.  TUBULAR ADENOMAS.    FLEXIBLE SIGMOIDOSCOPY  ~2001  [facility]    removal of lipoma      R arm    removal of  "melanoma  2009    L arm    TONSILLECTOMY         Social History     Social History    Marital status:      Spouse name: N/A    Number of children: N/A    Years of education: N/A     Social History Main Topics    Smoking status: Current Some Day Smoker     Types: Cigars    Smokeless tobacco: Never Used      Comment: smokes a cigar on occasion    Alcohol use Yes      Comment: 1 drink per month    Drug use: No    Sexual activity: Yes     Partners: Female     Other Topics Concern    None     Social History Narrative    None         Medications/Allergies: See med card    Vitals:    07/25/18 1022   BP: (!) 145/67   Pulse: 64   Resp: 20   Temp: 97.4 °F (36.3 °C)   TempSrc: Oral   SpO2: 96%   Weight: 81.9 kg (180 lb 10.7 oz)   Height: 6' 2" (1.88 m)   PainSc:   4   PainLoc: Neck         Physical exam:  Gen: A and O x3, pleasant, well-groomed  Skin: No rashes or obvious lesions  HEENT: PERRLA, no obvious deformities on ears or in canals.Trachea midline.  CVS: Regular rate and rhythm, normal palpable pulses.  Resp: Clear to auscultation bilaterally, no wheezes or rales.  Abdomen: Soft, NT/ND.  Musculoskeletal: Able to heel walk, toe walk. No antalgic gait.     Neuro:  Upper extremities: 5/5 strength bilaterally   Reflexes: Brachioradialis 3+, Bicep 3+, Tricep 3+.   Sensory: Intact and symmetrical to light touch and pinprick in C2-T1 dermatomes bilaterally.    Cervical Spine:  Cervical spine: Range of motion is mildly reduced with flexion, moderately reduced with extension with mild increased pain on extension, mildly decreased with lateral rotation with mild increased pain on end maneuver to each side.  Spurling's maneuver causes neck pain to either side but no radicular pain.  Myofascial exam: There is tenderness to palpation over the left cervical paraspinous region and into the left rhomboids.      Imaging:  3/22/18 MRI C-spine  C2-C3 demonstrates some similar mild dorsal disc bulge and spurring with mild " lateral recess, inferior neural foraminal narrowing left more so than right.  C3-C4 demonstrates a marginal dorsal disc bulge and left paracentral predominantly and annular fissuring.  There is mild to moderate right as well as moderate to significant left neural foraminal narrowing.  C4-C5 demonstrates some marginal dorsal disc bulging and minimal annular fissuring along with mild neural foraminal narrowing left more so than right.  C5-C6 demonstrates some dorsal disc bulge, spurring and annular fissuring.  There is moderate to significant neural foraminal narrowing bilaterally along with facet hypertrophy.  C6-C7 demonstrates some disc space narrowing and broad-based dorsal disc bulge, spurring.  There is moderate to significant neural foraminal narrowing bilaterally left more so than right area in  C7-T1 demonstrates marginal dorsal disc bulge with moderate right and moderate to significant left neural foraminal narrowing along with posterior element hypertrophy, spurring and similar changes at T1-T2 although incompletely included.  AP canal diameter at C5-6 level is approximately 1 cm.    Assessment:   Patient is a 77-year-old man with a history of type 2 diabetes, hypertension, cervical DDD who initially presented in referral from Dr. Koch for neck pain radiating into the left arm.     1. Cervical radiculopathy     2. DDD (degenerative disc disease), cervical     3. Spondylosis of cervical region without myelopathy or radiculopathy         Plan:  1.  He continues to have neck pain to the left side, no longer has any radicular pain.  He does have significant foraminal narrowing out to the left side at C6/7, canal narrowing at C7/T1 and he also has some narrowing in the left foramen up at C3/4.  We discussed that his pain may be coming from his lower cervical spine and I will set up a second C7/T1 DARLENE to cover both the C6/7 and C7/T1 levels.  If he is not having relief with this, he does have some  distribution in the C3/4 area so consideration could be made for transforaminal approach to that level.  I will have him follow-up in 3 weeks after the injection or sooner as needed.    Thank you for referring this interesting patient, and I look forward to continuing to collaborate in his care.

## 2018-07-25 NOTE — LETTER
July 25, 2018      Sosa Koch MD  [address] [facility] [address] [place] [address]           [facility]  [address] [facility] [address] [place] [address]  Phone: [phone]  Fax: [phone]          Patient: Carolann Waite   MR Number: [id number]   YOB: 1940   Date of Visit: 7/25/2018       Dear Dr. Sosa Koch:    Thank you for referring Carolann Waite to me for evaluation. Attached you will find relevant portions of my assessment and plan of care.    If you have questions, please do not hesitate to call me. I look forward to following Carolann Waite along with you.    Sincerely,    Keny Ibanez MD    Enclosure  CC:  No Recipients    If you would like to receive this communication electronically, please contact [email or web address] or [phone] to request more information on [facility] access.    For providers and/or their staff who would like to refer a patient to [facility], please contact us through our one-stop-shop provider referral line, [facility], at [phone].    If you feel you have received this communication in error or would no longer like to receive these types of communications, please e-mail [email or web address]

## 2018-08-07 DIAGNOSIS — K21.9 GASTROESOPHAGEAL REFLUX DISEASE, ESOPHAGITIS PRESENCE NOT SPECIFIED: ICD-10-CM

## 2018-08-07 RX ORDER — PANTOPRAZOLE SODIUM 40 MG/1
TABLET, DELAYED RELEASE ORAL
Qty: 90 TABLET | Refills: 0 | OUTPATIENT
Start: 2018-08-07

## 2018-08-14 DIAGNOSIS — M50.30 DDD (DEGENERATIVE DISC DISEASE), CERVICAL: Primary | ICD-10-CM

## 2018-08-14 RX ORDER — LOSARTAN POTASSIUM 100 MG/1
50 TABLET ORAL EVERY MORNING
COMMUNITY

## 2018-08-15 ENCOUNTER — HOSPITAL ENCOUNTER (OUTPATIENT)
Facility: HOSPITAL | Age: 78
Discharge: HOME | End: 2018-08-15
Attending: ANESTHESIOLOGY | Admitting: ANESTHESIOLOGY
Payer: MEDICARE

## 2018-08-15 ENCOUNTER — HOSPITAL ENCOUNTER (OUTPATIENT)
Dept: RADIOLOGY | Facility: HOSPITAL | Age: 78
Discharge: HOME | End: 2018-08-15
Attending: ANESTHESIOLOGY | Admitting: ANESTHESIOLOGY
Payer: MEDICARE

## 2018-08-15 VITALS
SYSTOLIC BLOOD PRESSURE: 146 MMHG | BODY MASS INDEX: 23.1 KG/M2 | RESPIRATION RATE: 15 BRPM | DIASTOLIC BLOOD PRESSURE: 64 MMHG | HEART RATE: 67 BPM | WEIGHT: 180 LBS | HEIGHT: 74 IN | TEMPERATURE: 98 F | OXYGEN SATURATION: 100 %

## 2018-08-15 DIAGNOSIS — M50.30 DDD (DEGENERATIVE DISC DISEASE), CERVICAL: ICD-10-CM

## 2018-08-15 DIAGNOSIS — M54.12 CERVICAL RADICULOPATHY: Primary | ICD-10-CM

## 2018-08-15 LAB — GLUCOSE SERPL-MCNC: 101 MG/DL (ref 70–110)

## 2018-08-15 PROCEDURE — 71000033 HC RECOVERY, INTIAL HOUR: Mod: PO | Performed by: ANESTHESIOLOGY

## 2018-08-15 PROCEDURE — 82962 GLUCOSE BLOOD TEST: CPT | Mod: PO | Performed by: ANESTHESIOLOGY

## 2018-08-15 PROCEDURE — 99152 MOD SED SAME PHYS/QHP 5/>YRS: CPT | Mod: ,,, | Performed by: ANESTHESIOLOGY

## 2018-08-15 PROCEDURE — 25000003 PHARM REV CODE 250: Mod: PO | Performed by: ANESTHESIOLOGY

## 2018-08-15 PROCEDURE — 76000 FLUOROSCOPY <1 HR PHYS/QHP: CPT | Mod: TC,PO

## 2018-08-15 PROCEDURE — 63600175 PHARM REV CODE 636 W HCPCS: Mod: PO | Performed by: ANESTHESIOLOGY

## 2018-08-15 PROCEDURE — A4216 STERILE WATER/SALINE, 10 ML: HCPCS | Mod: PO | Performed by: ANESTHESIOLOGY

## 2018-08-15 PROCEDURE — 62321 NJX INTERLAMINAR CRV/THRC: CPT | Mod: ,,, | Performed by: ANESTHESIOLOGY

## 2018-08-15 PROCEDURE — 62320 NJX INTERLAMINAR CRV/THRC: CPT | Mod: PO | Performed by: ANESTHESIOLOGY

## 2018-08-15 PROCEDURE — 62321 NJX INTERLAMINAR CRV/THRC: CPT | Mod: PO | Performed by: ANESTHESIOLOGY

## 2018-08-15 PROCEDURE — 25500020 PHARM REV CODE 255: Mod: PO | Performed by: ANESTHESIOLOGY

## 2018-08-15 RX ORDER — LIDOCAINE HYDROCHLORIDE 10 MG/ML
1 INJECTION, SOLUTION EPIDURAL; INFILTRATION; INTRACAUDAL; PERINEURAL ONCE
Status: COMPLETED | OUTPATIENT
Start: 2018-08-15 | End: 2018-08-15

## 2018-08-15 RX ORDER — SODIUM CHLORIDE 9 MG/ML
INJECTION, SOLUTION INTRAMUSCULAR; INTRAVENOUS; SUBCUTANEOUS
Status: DISCONTINUED | OUTPATIENT
Start: 2018-08-15 | End: 2018-08-15 | Stop reason: HOSPADM

## 2018-08-15 RX ORDER — LIDOCAINE HYDROCHLORIDE 10 MG/ML
INJECTION, SOLUTION EPIDURAL; INFILTRATION; INTRACAUDAL; PERINEURAL
Status: DISCONTINUED | OUTPATIENT
Start: 2018-08-15 | End: 2018-08-15 | Stop reason: HOSPADM

## 2018-08-15 RX ORDER — SODIUM CHLORIDE, SODIUM LACTATE, POTASSIUM CHLORIDE, CALCIUM CHLORIDE 600; 310; 30; 20 MG/100ML; MG/100ML; MG/100ML; MG/100ML
INJECTION, SOLUTION INTRAVENOUS CONTINUOUS
Status: DISCONTINUED | OUTPATIENT
Start: 2018-08-15 | End: 2018-08-15 | Stop reason: HOSPADM

## 2018-08-15 RX ORDER — MIDAZOLAM HYDROCHLORIDE 2 MG/2ML
INJECTION, SOLUTION INTRAMUSCULAR; INTRAVENOUS
Status: DISCONTINUED | OUTPATIENT
Start: 2018-08-15 | End: 2018-08-15 | Stop reason: HOSPADM

## 2018-08-15 RX ORDER — METHYLPREDNISOLONE ACETATE 80 MG/ML
INJECTION, SUSPENSION INTRA-ARTICULAR; INTRALESIONAL; INTRAMUSCULAR; SOFT TISSUE
Status: DISCONTINUED | OUTPATIENT
Start: 2018-08-15 | End: 2018-08-15 | Stop reason: HOSPADM

## 2018-08-15 RX ADMIN — LIDOCAINE HYDROCHLORIDE 10 MG: 10 INJECTION, SOLUTION EPIDURAL; INFILTRATION; INTRACAUDAL; PERINEURAL at 12:08

## 2018-08-15 RX ADMIN — SODIUM CHLORIDE, SODIUM LACTATE, POTASSIUM CHLORIDE, AND CALCIUM CHLORIDE: .6; .31; .03; .02 INJECTION, SOLUTION INTRAVENOUS at 12:08

## 2018-08-15 NOTE — OP NOTE
PROCEDURE DATE: 8/15/2018    Procedure: C7-T1 cervical interlaminar epidural steroid injection under utilizing fluoroscopy.    Diagnosis: Cervical Radiculopathy    POSTOP DIAGNOSIS: SAME    Physician: Keny Ibanez MD    Medications injected:  Methylprednisone 80mg followed by a slow injection of 4 mL sterile, preservative-free normal saline.    Local anesthetic used: Lidocaine 1%, 4 ml.    Sedation Medications: 2mg versed    Complications:  none    Estimated blood loss: none    Technique:  A time-out was taken to identify patient and procedure prior to starting the procedure.  With the patient laying in a prone position with the neck in a mid-flexed forward position, the area was prepped and draped in the usual sterile fashion using ChloraPrep and a fenestrated drape.  The area was determined under AP fluoroscopic guidance.  Local anesthetic was given using a 25-gauge 1.5 inch needle by raising a wheal and then infiltrating ventrally.  A 3.5 inch 20-gauge Touhy needle was introduced under fluoroscopic guidance to meet the lamina of C7.  The needle was then hinged under the lamina then advanced using loss of resistance technique.  Once the tip of the needle was in the desired position, the contrast dye Omnipaque was injected to determine placement and no uptake.  The steroid was then injected slowly followed by a slow injection of 4 mL of the sterile preservative-free normal saline.  The patient tolerated the procedure well.    The patient was monitored after the procedure and was given post-procedure and discharge instructions to follow at home. The patient was discharged in a stable condition.

## 2018-08-15 NOTE — DISCHARGE SUMMARY
[facility]  Discharge Note  Short Stay    Admit Date: 8/15/2018    Discharge Date: 8/15/2018    Attending Physician: Keny Ibanez MD     Discharge Provider: Keny Ibanez    Diagnoses:  Active Hospital Problems    Diagnosis  POA    *Cervical radiculopathy [M54.12]  Yes      Resolved Hospital Problems   No resolved problems to display.       Discharged Condition: good    Final Diagnoses: Cervical radiculopathy [M54.12]    Disposition: Home or Self Care    Hospital Course: no complications, uneventful    Outcome of Hospitalization, Treatment, Procedure, or Surgery:  Patient was admitted for outpatient procedure. The patient underwent procedure without complications and are discharged home    Follow up/Patient Instructions:  Follow up as scheduled in Pain Management clinic in 3-4 weeks/Patient has received instructions and follow up date and time    Medications:  Continue previous medications    Discharge Procedure Orders   Call MD for:  temperature >100.4     Call MD for:  severe uncontrolled pain     Call MD for:  redness, tenderness, or signs of infection (pain, swelling, redness, odor or green/yellow discharge around incision site)     Call MD for:  severe persistent headache     No dressing needed         Discharge Procedure Orders (must include Diet, Follow-up, Activity):   Discharge Procedure Orders (must include Diet, Follow-up, Activity)   Call MD for:  temperature >100.4     Call MD for:  severe uncontrolled pain     Call MD for:  redness, tenderness, or signs of infection (pain, swelling, redness, odor or green/yellow discharge around incision site)     Call MD for:  severe persistent headache     No dressing needed

## 2018-08-15 NOTE — INTERVAL H&P NOTE
The patient has been examined and the H&P has been reviewed:    I concur with the findings and no changes have occurred since H&P was written.    Anesthesia/Surgery risks, benefits and alternative options discussed and understood by patient/family.          Active Hospital Problems    Diagnosis  POA    Cervical radiculopathy [M54.12]  Yes      Resolved Hospital Problems   No resolved problems to display.

## 2018-09-04 RX ORDER — BLOOD SUGAR DIAGNOSTIC
STRIP MISCELLANEOUS
Qty: 200 STRIP | Refills: 11 | Status: SHIPPED | OUTPATIENT
Start: 2018-09-04 | End: 2022-03-09 | Stop reason: CLARIF

## 2018-09-12 ENCOUNTER — OFFICE VISIT (OUTPATIENT)
Dept: PAIN MEDICINE | Facility: CLINIC | Age: 78
End: 2018-09-12
Payer: MEDICARE

## 2018-09-12 VITALS
TEMPERATURE: 97 F | DIASTOLIC BLOOD PRESSURE: 65 MMHG | OXYGEN SATURATION: 96 % | RESPIRATION RATE: 18 BRPM | WEIGHT: 178.44 LBS | BODY MASS INDEX: 22.91 KG/M2 | HEART RATE: 60 BPM | SYSTOLIC BLOOD PRESSURE: 138 MMHG

## 2018-09-12 DIAGNOSIS — M54.12 CERVICAL RADICULOPATHY: Primary | ICD-10-CM

## 2018-09-12 DIAGNOSIS — M50.30 DDD (DEGENERATIVE DISC DISEASE), CERVICAL: ICD-10-CM

## 2018-09-12 PROCEDURE — 1101F PT FALLS ASSESS-DOCD LE1/YR: CPT | Mod: CPTII,,, | Performed by: PHYSICIAN ASSISTANT

## 2018-09-12 PROCEDURE — 3075F SYST BP GE 130 - 139MM HG: CPT | Mod: CPTII,,, | Performed by: PHYSICIAN ASSISTANT

## 2018-09-12 PROCEDURE — 99999 PR PBB SHADOW E&M-EST. PATIENT-LVL V: CPT | Mod: PBBFAC,,, | Performed by: PHYSICIAN ASSISTANT

## 2018-09-12 PROCEDURE — 3078F DIAST BP <80 MM HG: CPT | Mod: CPTII,,, | Performed by: PHYSICIAN ASSISTANT

## 2018-09-12 PROCEDURE — 99215 OFFICE O/P EST HI 40 MIN: CPT | Mod: PBBFAC,PN | Performed by: PHYSICIAN ASSISTANT

## 2018-09-12 PROCEDURE — 99213 OFFICE O/P EST LOW 20 MIN: CPT | Mod: S$PBB,,, | Performed by: PHYSICIAN ASSISTANT

## 2018-09-12 RX ORDER — SODIUM CHLORIDE, SODIUM LACTATE, POTASSIUM CHLORIDE, CALCIUM CHLORIDE 600; 310; 30; 20 MG/100ML; MG/100ML; MG/100ML; MG/100ML
INJECTION, SOLUTION INTRAVENOUS CONTINUOUS
Status: CANCELLED | OUTPATIENT
Start: 2018-09-27

## 2018-09-12 NOTE — H&P (VIEW-ONLY)
This note was completed with dictation software and grammatical errors may exist.    CC:Neck pain    HPI: Patient is a 78-year-old man with a history of type 2 diabetes, hypertension, cervical DDD who initially presented in referral from Dr. Koch for neck pain radiating into the left arm.  He is status post C7-T1 cervical interlaminar epidural steroid injection on 08/15/2018 with 0% relief.  The patient is new to me.  He complains of aching and annoying left-sided neck pain radiating to the left scapular region.  This is worse with sitting for an extended period time.   he denies any pain in his left arm since his 1st injection. He denies numbness, weakness, bladder or bowel incontinence.    Pain intervention history:  He has done physical therapy with relief.  He is status post C7/T1 interlaminar epidural steroid injection on 04/30/2018 with 100% relief of his left arm pain.  He is status post C7-T1 cervical interlaminar epidural steroid injection on 08/15/2018 with 0% relief.    ROS: He reports a runny nose, cough, easy bruising, urinary frequency, joint stiffness, back pain and loss of balance.  Balance of review of systems is negative.    Past Medical History:   Diagnosis Date    Arthritis     Cataract     OU    Colon polyps     Diabetes     Diabetes mellitus, type 2     Gout     HTN (hypertension)     Hyperlipidemia LDL goal <100     Impotence     Melanoma     Left forearm, s/p excision    Squamous cell carcinoma     Head/nose       Past Surgical History:   Procedure Laterality Date    cancer of skin (nose)  2/2014    basal cell ca    CIRCUMCISION, PRIMARY      COLONOSCOPY  7/31/2009  [facility]    Normal colon and TI.    COLONOSCOPY N/A 10/8/2014    [facility]    COLONOSCOPY W/ POLYPECTOMY  8/7/2006  [facility]    Three cecal polyps, largest 4mm x 12mm.  TUBULAR ADENOMAS.  One 2 mm in the hepatic flexure.  TUBULAR ADENOMAS.    EPIDURAL STEROID INJECTION  INTO CERVICAL SPINE N/A 8/15/2018    Procedure: Injection-steroid-epidural-cervical;  Surgeon: Keny Ibanez MD;  Location: [facility] OR;  Service: Pain Management;  Laterality: N/A;  to left    FLEXIBLE SIGMOIDOSCOPY  ~2001  [facility]    Injection-steroid-epidural-cervical N/A 8/15/2018    Performed by [facility]y [facility]banez MD at [facility] OR    INJECTION-STEROID-EPIDURAL-CERVICAL N/A 4/30/2018    Performed by [facility]y [facility]z MD at [facility] OR    removal of lipoma      R arm    removal of melanoma  2009    L arm    TONSILLECTOMY         Social History     Socioeconomic History    Marital status:      Spouse name: None    Number of children: None    Years of education: None    Highest education level: None   Social Needs    Financial resource strain: None    Food insecurity - worry: None    Food insecurity - inability: None    Transportation needs - medical: None    Transportation needs - non-medical: None   Occupational History    None   Tobacco Use    Smoking status: Current Some Day Smoker     Types: Cigars    Smokeless tobacco: Never Used    Tobacco comment: smokes a cigar on occasion   Substance and Sexual Activity    Alcohol use: Yes     Comment: 1 drink per month    Drug use: No    Sexual activity: Yes     Partners: Female   Other Topics Concern    None   Social History Narrative    None         Medications/Allergies: See med card    Vitals:    09/12/18 0914   BP: 138/65   Pulse: 60   Resp: 18   Temp: 96.7 °F (35.9 °C)   TempSrc: Oral   SpO2: 96%   Weight: 81 kg (178 lb 7.4 oz)   PainSc:   4   PainLoc: Neck         Physical exam:  Gen: A and O x3, pleasant, well-groomed  Skin: No rashes or obvious lesions  HEENT: PERRLA, no obvious deformities on ears or in canals.Trachea midline.  CVS: Regular rate and rhythm, normal palpable pulses.  Resp: Clear to auscultation bilaterally, no wheezes or rales.  Abdomen: Soft, NT/ND.  Musculoskeletal: Able to heel walk, toe walk. No antalgic gait.      Neuro:  Upper extremities: 5/5 strength bilaterally   Reflexes: Brachioradialis 3+, Bicep 3+, Tricep 3+.   Sensory: Intact and symmetrical to light touch and pinprick in C2-T1 dermatomes bilaterally.    Cervical Spine:  Cervical spine: Range of motion is mildly reduced with flexion, moderately reduced with extension with mild increased pain on extension, mildly decreased with lateral rotation with mild increased pain on end maneuver to each side.  Spurling's maneuver causes neck pain to either side but no radicular pain.  Myofascial exam: There is tenderness to palpation over the left cervical paraspinous region and into the left rhomboids.      Imaging:  3/22/18 MRI C-spine  C2-C3 demonstrates some similar mild dorsal disc bulge and spurring with mild lateral recess, inferior neural foraminal narrowing left more so than right.  C3-C4 demonstrates a marginal dorsal disc bulge and left paracentral predominantly and annular fissuring.  There is mild to moderate right as well as moderate to significant left neural foraminal narrowing.  C4-C5 demonstrates some marginal dorsal disc bulging and minimal annular fissuring along with mild neural foraminal narrowing left more so than right.  C5-C6 demonstrates some dorsal disc bulge, spurring and annular fissuring.  There is moderate to significant neural foraminal narrowing bilaterally along with facet hypertrophy.  C6-C7 demonstrates some disc space narrowing and broad-based dorsal disc bulge, spurring.  There is moderate to significant neural foraminal narrowing bilaterally left more so than right area in  C7-T1 demonstrates marginal dorsal disc bulge with moderate right and moderate to significant left neural foraminal narrowing along with posterior element hypertrophy, spurring and similar changes at T1-T2 although incompletely included.  AP canal diameter at C5-6 level is approximately 1 cm.    Assessment:   Patient is a 78-year-old man with a history of type 2  diabetes, hypertension, cervical DDD who initially presented in referral from Dr. Koch for neck pain radiating into the left arm.     1. Cervical radiculopathy     2. DDD (degenerative disc disease), cervical         Plan:  1.  Since the patient did not have additional benefit following the C7/T1 interlaminar DARLENE, we discussed trying a different approach, more specific with a C3/4 transforaminal injection on the left side.  We discussed the risks involved and he would like to proceed.  If he does not have relief with this, we may consider left C4, 5, 6 and 7 diagnostic medial branch blocks.  2.  Follow-up in 4 weeks postprocedure or sooner as needed.

## 2018-09-12 NOTE — PROGRESS NOTES
This note was completed with dictation software and grammatical errors may exist.    CC:Neck pain    HPI: Patient is a 78-year-old man with a history of type 2 diabetes, hypertension, cervical DDD who initially presented in referral from Dr. Koch for neck pain radiating into the left arm.  He is status post C7-T1 cervical interlaminar epidural steroid injection on 08/15/2018 with 0% relief.  The patient is new to me.  He complains of aching and annoying left-sided neck pain radiating to the left scapular region.  This is worse with sitting for an extended period time.   he denies any pain in his left arm since his 1st injection. He denies numbness, weakness, bladder or bowel incontinence.    Pain intervention history:  He has done physical therapy with relief.  He is status post C7/T1 interlaminar epidural steroid injection on 04/30/2018 with 100% relief of his left arm pain.  He is status post C7-T1 cervical interlaminar epidural steroid injection on 08/15/2018 with 0% relief.    ROS: He reports a runny nose, cough, easy bruising, urinary frequency, joint stiffness, back pain and loss of balance.  Balance of review of systems is negative.    Past Medical History:   Diagnosis Date    Arthritis     Cataract     OU    Colon polyps     Diabetes     Diabetes mellitus, type 2     Gout     HTN (hypertension)     Hyperlipidemia LDL goal <100     Impotence     Melanoma     Left forearm, s/p excision    Squamous cell carcinoma     Head/nose       Past Surgical History:   Procedure Laterality Date    cancer of skin (nose)  2/2014    basal cell ca    CIRCUMCISION, PRIMARY      COLONOSCOPY  7/31/2009  [facility]    Normal colon and TI.    COLONOSCOPY N/A 10/8/2014    [facility]    COLONOSCOPY W/ POLYPECTOMY  8/7/2006  [facility]    Three cecal polyps, largest 4mm x 12mm.  TUBULAR ADENOMAS.  One 2 mm in the hepatic flexure.  TUBULAR ADENOMAS.    EPIDURAL STEROID INJECTION  INTO CERVICAL SPINE N/A 8/15/2018    Procedure: Injection-steroid-epidural-cervical;  Surgeon: Keny Ibanez MD;  Location: [facility] OR;  Service: Pain Management;  Laterality: N/A;  to left    FLEXIBLE SIGMOIDOSCOPY  ~2001  [facility]    Injection-steroid-epidural-cervical N/A 8/15/2018    Performed by [facility] at [facility] OR    INJECTION-STEROID-EPIDURAL-CERVICAL N/A 4/30/2018    Per[facility] by [facility] MD at [facility] OR    removal of lipoma      R arm    removal of melanoma  2009    L arm    TONSILLECTOMY         Social History     Socioeconomic History    Marital status:      Spouse name: None    Number of children: None    Years of education: None    Highest education level: None   Social Needs    Financial resource strain: None    Food insecurity - worry: None    Food insecurity - inability: None    Transportation needs - medical: None    Transportation needs - non-medical: None   Occupational History    None   Tobacco Use    Smoking status: Current Some Day Smoker     Types: Cigars    Smokeless tobacco: Never Used    Tobacco comment: smokes a cigar on occasion   Substance and Sexual Activity    Alcohol use: Yes     Comment: 1 drink per month    Drug use: No    Sexual activity: Yes     Partners: Female   Other Topics Concern    None   Social History Narrative    None         Medications/Allergies: See med card    Vitals:    09/12/18 0914   BP: 138/65   Pulse: 60   Resp: 18   Temp: 96.7 °F (35.9 °C)   TempSrc: Oral   SpO2: 96%   Weight: 81 kg (178 lb 7.4 oz)   PainSc:   4   PainLoc: Neck         Physical exam:  Gen: A and O x3, pleasant, well-groomed  Skin: No rashes or obvious lesions  HEENT: PERRLA, no obvious deformities on ears or in canals.Trachea midline.  CVS: Regular rate and rhythm, normal palpable pulses.  Resp: Clear to auscultation bilaterally, no wheezes or rales.  Abdomen: Soft, NT/ND.  Musculoskeletal: Able to heel walk, toe walk. No antalgic gait.      Neuro:  Upper extremities: 5/5 strength bilaterally   Reflexes: Brachioradialis 3+, Bicep 3+, Tricep 3+.   Sensory: Intact and symmetrical to light touch and pinprick in C2-T1 dermatomes bilaterally.    Cervical Spine:  Cervical spine: Range of motion is mildly reduced with flexion, moderately reduced with extension with mild increased pain on extension, mildly decreased with lateral rotation with mild increased pain on end maneuver to each side.  Spurling's maneuver causes neck pain to either side but no radicular pain.  Myofascial exam: There is tenderness to palpation over the left cervical paraspinous region and into the left rhomboids.      Imaging:  3/22/18 MRI C-spine  C2-C3 demonstrates some similar mild dorsal disc bulge and spurring with mild lateral recess, inferior neural foraminal narrowing left more so than right.  C3-C4 demonstrates a marginal dorsal disc bulge and left paracentral predominantly and annular fissuring.  There is mild to moderate right as well as moderate to significant left neural foraminal narrowing.  C4-C5 demonstrates some marginal dorsal disc bulging and minimal annular fissuring along with mild neural foraminal narrowing left more so than right.  C5-C6 demonstrates some dorsal disc bulge, spurring and annular fissuring.  There is moderate to significant neural foraminal narrowing bilaterally along with facet hypertrophy.  C6-C7 demonstrates some disc space narrowing and broad-based dorsal disc bulge, spurring.  There is moderate to significant neural foraminal narrowing bilaterally left more so than right area in  C7-T1 demonstrates marginal dorsal disc bulge with moderate right and moderate to significant left neural foraminal narrowing along with posterior element hypertrophy, spurring and similar changes at T1-T2 although incompletely included.  AP canal diameter at C5-6 level is approximately 1 cm.    Assessment:   Patient is a 78-year-old man with a history of type 2  diabetes, hypertension, cervical DDD who initially presented in referral from Dr. Koch for neck pain radiating into the left arm.     1. Cervical radiculopathy     2. DDD (degenerative disc disease), cervical         Plan:  1.  Since the patient did not have additional benefit following the C7/T1 interlaminar DARLENE, we discussed trying a different approach, more specific with a C3/4 transforaminal injection on the left side.  We discussed the risks involved and he would like to proceed.  If he does not have relief with this, we may consider left C4, 5, 6 and 7 diagnostic medial branch blocks.  2.  Follow-up in 4 weeks postprocedure or sooner as needed.

## 2018-09-26 DIAGNOSIS — R19.4 FREQUENT BOWEL MOVEMENTS: ICD-10-CM

## 2018-09-26 DIAGNOSIS — M50.30 DDD (DEGENERATIVE DISC DISEASE), CERVICAL: Primary | ICD-10-CM

## 2018-09-27 ENCOUNTER — HOSPITAL ENCOUNTER (OUTPATIENT)
Dept: RADIOLOGY | Facility: HOSPITAL | Age: 78
Discharge: HOME | End: 2018-09-27
Attending: ANESTHESIOLOGY | Admitting: ANESTHESIOLOGY
Payer: MEDICARE

## 2018-09-27 ENCOUNTER — HOSPITAL ENCOUNTER (OUTPATIENT)
Facility: HOSPITAL | Age: 78
Discharge: HOME | End: 2018-09-27
Attending: ANESTHESIOLOGY | Admitting: ANESTHESIOLOGY
Payer: MEDICARE

## 2018-09-27 DIAGNOSIS — M54.12 CERVICAL RADICULOPATHY: Primary | ICD-10-CM

## 2018-09-27 DIAGNOSIS — M50.30 DDD (DEGENERATIVE DISC DISEASE), CERVICAL: ICD-10-CM

## 2018-09-27 LAB — GLUCOSE SERPL-MCNC: 123 MG/DL (ref 70–110)

## 2018-09-27 PROCEDURE — 25000003 PHARM REV CODE 250: Mod: PO | Performed by: ANESTHESIOLOGY

## 2018-09-27 PROCEDURE — 64479 NJX AA&/STRD TFRM EPI C/T 1: CPT | Mod: PO | Performed by: ANESTHESIOLOGY

## 2018-09-27 PROCEDURE — 99152 MOD SED SAME PHYS/QHP 5/>YRS: CPT | Mod: ,,, | Performed by: ANESTHESIOLOGY

## 2018-09-27 PROCEDURE — 76000 FLUOROSCOPY <1 HR PHYS/QHP: CPT | Mod: TC,PO

## 2018-09-27 PROCEDURE — 63600175 PHARM REV CODE 636 W HCPCS: Mod: PO | Performed by: ANESTHESIOLOGY

## 2018-09-27 PROCEDURE — 64479 NJX AA&/STRD TFRM EPI C/T 1: CPT | Mod: LT,,, | Performed by: ANESTHESIOLOGY

## 2018-09-27 PROCEDURE — 25500020 PHARM REV CODE 255: Mod: PO | Performed by: ANESTHESIOLOGY

## 2018-09-27 PROCEDURE — 82962 GLUCOSE BLOOD TEST: CPT | Mod: PO | Performed by: ANESTHESIOLOGY

## 2018-09-27 RX ORDER — LIDOCAINE HYDROCHLORIDE 10 MG/ML
INJECTION, SOLUTION EPIDURAL; INFILTRATION; INTRACAUDAL; PERINEURAL
Status: DISCONTINUED | OUTPATIENT
Start: 2018-09-27 | End: 2018-09-27 | Stop reason: HOSPADM

## 2018-09-27 RX ORDER — DEXAMETHASONE SODIUM PHOSPHATE 10 MG/ML
INJECTION INTRAMUSCULAR; INTRAVENOUS
Status: DISCONTINUED | OUTPATIENT
Start: 2018-09-27 | End: 2018-09-27 | Stop reason: HOSPADM

## 2018-09-27 RX ORDER — SODIUM CHLORIDE, SODIUM LACTATE, POTASSIUM CHLORIDE, CALCIUM CHLORIDE 600; 310; 30; 20 MG/100ML; MG/100ML; MG/100ML; MG/100ML
INJECTION, SOLUTION INTRAVENOUS CONTINUOUS
Status: DISCONTINUED | OUTPATIENT
Start: 2018-09-27 | End: 2018-09-27 | Stop reason: HOSPADM

## 2018-09-27 RX ORDER — FENTANYL CITRATE 50 UG/ML
INJECTION, SOLUTION INTRAMUSCULAR; INTRAVENOUS
Status: DISCONTINUED | OUTPATIENT
Start: 2018-09-27 | End: 2018-09-27 | Stop reason: HOSPADM

## 2018-09-27 RX ORDER — MIDAZOLAM HYDROCHLORIDE 2 MG/2ML
INJECTION, SOLUTION INTRAMUSCULAR; INTRAVENOUS
Status: DISCONTINUED | OUTPATIENT
Start: 2018-09-27 | End: 2018-09-27 | Stop reason: HOSPADM

## 2018-09-27 RX ORDER — LIDOCAINE HYDROCHLORIDE 20 MG/ML
INJECTION, SOLUTION EPIDURAL; INFILTRATION; INTRACAUDAL; PERINEURAL
Status: DISCONTINUED | OUTPATIENT
Start: 2018-09-27 | End: 2018-09-27 | Stop reason: HOSPADM

## 2018-09-27 RX ORDER — LIDOCAINE HYDROCHLORIDE AND EPINEPHRINE 10; 10 MG/ML; UG/ML
INJECTION, SOLUTION INFILTRATION; PERINEURAL
Status: DISCONTINUED | OUTPATIENT
Start: 2018-09-27 | End: 2018-09-27 | Stop reason: HOSPADM

## 2018-09-27 RX ORDER — LIDOCAINE HYDROCHLORIDE 10 MG/ML
1 INJECTION, SOLUTION EPIDURAL; INFILTRATION; INTRACAUDAL; PERINEURAL ONCE
Status: COMPLETED | OUTPATIENT
Start: 2018-09-27 | End: 2018-09-27

## 2018-09-27 RX ADMIN — LIDOCAINE HYDROCHLORIDE 5 MG: 10 INJECTION, SOLUTION EPIDURAL; INFILTRATION; INTRACAUDAL at 11:09

## 2018-09-27 RX ADMIN — SODIUM CHLORIDE, SODIUM LACTATE, POTASSIUM CHLORIDE, AND CALCIUM CHLORIDE: .6; .31; .03; .02 INJECTION, SOLUTION INTRAVENOUS at 11:09

## 2018-09-27 NOTE — DISCHARGE INSTRUCTIONS
Recovery After Procedural Sedation (Adult)  You have been given medicine by vein to make you sleep during your surgery. This may have included both a pain medicine and sleeping medicine. Most of the effects have worn off. But you may still have some drowsiness for the next 6 to 8 hours.  Home care  Follow these guidelines when you get home:  · For the next 8 hours, you should be watched by a responsible adult. This person should make sure your condition is not getting worse.  · Don't drink any alcohol for the next 24 hours.  · Don't drive, operate dangerous machinery, or make important business or personal decisions during the next 24 hours.  Note: Your healthcare provider may tell you not to take any medicine by mouth for pain or sleep in the next 4 hours. These medicines may react with the medicines you were given in the hospital. This could cause a much stronger response than usual.  Follow-up care  Follow up with your healthcare provider if you are not alert and back to your usual level of activity within 12 hours.  When to seek medical advice  Call your healthcare provider right away if any of these occur:  · Drowsiness gets worse  · Weakness or dizziness gets worse  · Repeated vomiting  · You can't be awakened   Date Last Reviewed: 10/18/2016  © 3104-1402 [employer]. [address] [place] [address], [place] [address]. All rights reserved. This information is not intended as a substitute for professional medical care. Always follow your healthcare professional's instructions.    Home care instructions  Apply ice pack to the injection site for 20 minutes periods for the first 24 hrs for soreness/discomfort at injection site DO NOT USE HEAT FOR 24 HOURS  Keep site clean and dry for 24 hours, remove bandaid when desired  Do not drive until tomorrow  Take care when walking after a cervical injection  Avoid strenuous activities for 2 days  Make take 2 weeks to feel the full effects   Resume home medication  as prescribed today  Resume Aspirin, Plavix, or Coumadin the day after the procedure unless otherwise instructed.    SEE IMMEDIATE MEDICAL HELP FOR:  Severe increase in your usual pain or appearance of new pain  Prolonged or increasing weakness or numbness in the legs or arms  Drainage, redness, active bleeding, or increased swelling at the injection site  Temperature over 100.0 degrees F.  Headache that increases when your head is upright and decreases when you lie flat    CALL 911 OR GO DIRECTLY TO EMERGENCY DEPARTMENT FOR:  Shortness of breath, chest pain, or problems breathing

## 2018-09-27 NOTE — DISCHARGE SUMMARY
[facility]  Discharge Note  Short Stay    Admit Date: 9/27/2018    Discharge Date: 9/27/2018    Attending Physician: Keny Ibanez MD     Discharge Provider: Keny Ibanez    Diagnoses:  Active Hospital Problems    Diagnosis  POA    *Cervical radiculopathy [M54.12]  Yes      Resolved Hospital Problems   No resolved problems to display.       Discharged Condition: good    Final Diagnoses: Cervical radiculopathy [M54.12]    Disposition: Home or Self Care    Hospital Course: no complications, uneventful    Outcome of Hospitalization, Treatment, Procedure, or Surgery:  Patient was admitted for outpatient procedure. The patient underwent procedure without complications and are discharged home    Follow up/Patient Instructions:  Follow up as scheduled in Pain Management clinic in 3-4 weeks/Patient has received instructions and follow up date and time    Medications:  Continue previous medications    Discharge Procedure Orders   Call MD for:  temperature >100.4     Call MD for:  severe uncontrolled pain     Call MD for:  redness, tenderness, or signs of infection (pain, swelling, redness, odor or green/yellow discharge around incision site)     Call MD for:  severe persistent headache     No dressing needed         Discharge Procedure Orders (must include Diet, Follow-up, Activity):   Discharge Procedure Orders (must include Diet, Follow-up, Activity)   Call MD for:  temperature >100.4     Call MD for:  severe uncontrolled pain     Call MD for:  redness, tenderness, or signs of infection (pain, swelling, redness, odor or green/yellow discharge around incision site)     Call MD for:  severe persistent headache     No dressing needed

## 2018-09-27 NOTE — OP NOTE
PROCEDURE DATE: 9/27/2018    PROCEDURE: Left C3/4 transforaminal epidural steroid injection under fluoroscopy    DIAGNOSIS: Cervical radiculopathy  Post op diagnosis: Same    PHYSICIAN: Keny Ibanez MD    MEDICATIONS INJECTED:  Methylprednisolone 40mg (0.5ml) and 1.5ml 0.25% bupivicaine at each nerve root.     LOCAL ANESTHETIC INJECTED:  Lidocaine 1%. 4 ml per site.    SEDATION MEDICATIONS: 2mg versed, 50mcg fentanyl    ESTIMATED BLOOD LOSS:  none    COMPLICATIONS:  none    TECHNIQUE:   A time-out was taken to identify patient and procedure side prior to starting the procedure. The patient was placed in a supine position, prepped and draped in the usual sterile fashion using ChloraPrep and sterile towels.  The area to be injected was determined under fluoroscopic guidance in AP and then oblique view, tilted to the side of interest.  Local anesthetic was given by raising a wheal and going down to the hub of a 25-gauge 1.5 inch needle.  In oblique view, a 3.5 inch 25-gauge bent-tip spinal needle was introduced towards the posterior aspect of the neural foramen at the level of the exiting nerve root and advanced until contact made with the superior articular process at the entrance to the neural foramen.  The needle was walked just anteriorly and medially to enter the foramen but advanced staying as posterior as possible within the neural foramen. In an AP view, the needle was advanced to lie in the midline of the silhouettes of the articular pillars.  Omnipaque contrast dye was injected under real-time digital subtraction to confirm appropriate placement and that there was no vascular uptake.  After negative aspiration for blood or CSF, 2ml of 1% lidocaine with epinephrine was instilled and no increased heart rate or systemic symptoms were noted. The final medication was then injected. This was performed at the left C3/4 level(s). The patient tolerated the procedure well.    The patient was monitored after the  procedure.  Patient was given post procedure and discharge instructions to follow at home. The patient was discharged in a stable condition.

## 2018-09-28 VITALS
SYSTOLIC BLOOD PRESSURE: 134 MMHG | WEIGHT: 175 LBS | HEART RATE: 62 BPM | OXYGEN SATURATION: 100 % | BODY MASS INDEX: 22.46 KG/M2 | HEIGHT: 74 IN | TEMPERATURE: 98 F | RESPIRATION RATE: 16 BRPM | DIASTOLIC BLOOD PRESSURE: 74 MMHG

## 2018-10-17 ENCOUNTER — TELEPHONE (OUTPATIENT)
Dept: GASTROENTEROLOGY | Facility: CLINIC | Age: 78
End: 2018-10-17

## 2018-10-17 NOTE — TELEPHONE ENCOUNTER
Pt on his way to see PCP instr if PCP recommends EGD we will be able to sched over the phone. Pt verbs understanding

## 2018-10-17 NOTE — TELEPHONE ENCOUNTER
----- Message from Caroline Dobson sent at 10/17/2018  9:09 AM CDT -----  Type:  Sooner Appointment Request    Caller is requesting a sooner appointment.      Name of Caller:  Patient  When is the first available appointment?  October 31st  Symptoms:  Stomach issues  Best Call Back Number:  [phone] (cellphone)  Additional Information:  Patient was last seen in '2014

## 2018-10-25 ENCOUNTER — OFFICE VISIT (OUTPATIENT)
Dept: PAIN MEDICINE | Facility: CLINIC | Age: 78
End: 2018-10-25
Payer: MEDICARE

## 2018-10-25 VITALS
HEART RATE: 58 BPM | SYSTOLIC BLOOD PRESSURE: 147 MMHG | OXYGEN SATURATION: 99 % | RESPIRATION RATE: 18 BRPM | WEIGHT: 181.19 LBS | BODY MASS INDEX: 23.27 KG/M2 | TEMPERATURE: 96 F | DIASTOLIC BLOOD PRESSURE: 67 MMHG

## 2018-10-25 DIAGNOSIS — M50.30 DDD (DEGENERATIVE DISC DISEASE), CERVICAL: ICD-10-CM

## 2018-10-25 DIAGNOSIS — M47.812 SPONDYLOSIS OF CERVICAL REGION WITHOUT MYELOPATHY OR RADICULOPATHY: ICD-10-CM

## 2018-10-25 DIAGNOSIS — M54.12 CERVICAL RADICULOPATHY: Primary | ICD-10-CM

## 2018-10-25 PROCEDURE — 3078F DIAST BP <80 MM HG: CPT | Mod: CPTII,,, | Performed by: PHYSICIAN ASSISTANT

## 2018-10-25 PROCEDURE — 99999 PR PBB SHADOW E&M-EST. PATIENT-LVL IV: CPT | Mod: PBBFAC,,, | Performed by: PHYSICIAN ASSISTANT

## 2018-10-25 PROCEDURE — 99213 OFFICE O/P EST LOW 20 MIN: CPT | Mod: S$PBB,,, | Performed by: PHYSICIAN ASSISTANT

## 2018-10-25 PROCEDURE — 3077F SYST BP >= 140 MM HG: CPT | Mod: CPTII,,, | Performed by: PHYSICIAN ASSISTANT

## 2018-10-25 PROCEDURE — 99214 OFFICE O/P EST MOD 30 MIN: CPT | Mod: PBBFAC,PN | Performed by: PHYSICIAN ASSISTANT

## 2018-10-25 PROCEDURE — 1101F PT FALLS ASSESS-DOCD LE1/YR: CPT | Mod: CPTII,,, | Performed by: PHYSICIAN ASSISTANT

## 2018-10-25 NOTE — PROGRESS NOTES
This note was completed with dictation software and grammatical errors may exist.    CC:Neck pain    HPI: Patient is a 78-year-old man with a history of type 2 diabetes, hypertension, cervical DDD who initially presented in referral from Dr. Koch for neck pain radiating into the left arm.  He is status post left C3/4 transforaminal epidural steroid injection on 09/27/2018 with mild additional relief.  He feels that overall he is 60-70% better after 3 injections and physical therapy this year.  He has his last PT visit today.  He continues to have some left-sided neck pain but minimal pain in the left scapular region and no longer has symptoms in his left arm since before the 1st injection.  His pain is worse with activity but he is able to play golf without major increased pain. He has improvement with sitting.  He denies weakness, numbness, bladder or bowel incontinence.    Pain intervention history:  He has done physical therapy with relief.  He is status post C7/T1 interlaminar epidural steroid injection on 04/30/2018 with 100% relief of his left arm pain.  He is status post C7-T1 cervical interlaminar epidural steroid injection on 08/15/2018 with 0% relief.  He feels that overall he is 60-70% better after 3 injections and physical therapy this year.    ROS: He reports a runny nose, cough, easy bruising, urinary frequency, joint stiffness, back pain and loss of balance.  Balance of review of systems is negative.    Past Medical History:   Diagnosis Date    Arthritis     Cataract     OU    Colon polyps     Diabetes     Diabetes mellitus, type 2     Gout     HTN (hypertension)     Hyperlipidemia LDL goal <100     Impotence     Melanoma     Left forearm, s/p excision    Squamous cell carcinoma     Head/nose       Past Surgical History:   Procedure Laterality Date    cancer of skin (nose)  2/2014    basal cell ca    CIRCUMCISION, PRIMARY      COLONOSCOPY  7/31/2009  Aubrey    Normal colon and TI.     COLONOSCOPY N/A 10/8/2014    Performed by Ron Ricci Jr., MD at [facility] ENDO    COLONOSCOPY W/ POLYPECTOMY  8/7/2006  Radhames    Three cecal polyps, largest 4mm x 12mm.  TUBULAR ADENOMAS.  One 2 mm in the hepatic flexure.  TUBULAR ADENOMAS.    EPIDURAL STEROID INJECTION INTO CERVICAL SPINE N/A 8/15/2018    Procedure: Injection-steroid-epidural-cervical;  Surgeon: Keny Ibanez MD;  Location: [facility] OR;  Service: Pain Management;  Laterality: N/A;  to left    FLEXIBLE SIGMOIDOSCOPY  ~2001  Landers    Injection,steroid,epidural,transforaminal approach C3/4 Left 9/27/2018    Performed by Keny Ibanez MD at [facility] OR    Injection-steroid-epidural-cervical N/A 8/15/2018    Performed by Keny [facility]banez MD at [facility] OR    INJECTION-STEROID-EPIDURAL-CERVICAL N/A 4/30/2018    Performed by Keny Ibanez MD at [facility] OR    removal of lipoma      R arm    removal of melanoma  2009    L arm    TONSILLECTOMY         Social History     Socioeconomic History    Marital status:      Spouse name: None    Number of children: None    Years of education: None    Highest education level: None   Social Needs    Financial resource strain: None    Food insecurity - worry: None    Food insecurity - inability: None    Transportation needs - medical: None    Transportation needs - non-medical: None   Occupational History    None   Tobacco Use    Smoking status: Current Some Day Smoker     Types: Cigars    Smokeless tobacco: Never Used    Tobacco comment: smokes a cigar on occasion   Substance and Sexual Activity    Alcohol use: Yes     Comment: 1 drink per month    Drug use: No    Sexual activity: Yes     Partners: Female   Other Topics Concern    None   Social History Narrative    None         Medications/Allergies: See med card    Vitals:    10/25/18 0845   BP: (!) 147/67   Pulse: (!) 58   Resp: 18   Temp: 96.1 °F (35.6 °C)   TempSrc: Oral   SpO2: 99%   Weight: 82.2 kg (181 lb 3.5 oz)    PainSc:   2   PainLoc: Neck         Physical exam:  Gen: A and O x3, pleasant, well-groomed  Skin: No rashes or obvious lesions  HEENT: PERRLA, no obvious deformities on ears or in canals.Trachea midline.  CVS: Regular rate and rhythm, normal palpable pulses.  Resp: Clear to auscultation bilaterally, no wheezes or rales.  Abdomen: Soft, NT/ND.  Musculoskeletal: Able to heel walk, toe walk. No antalgic gait.     Neuro:  Upper extremities: 5/5 strength bilaterally   Reflexes: Brachioradialis 3+, Bicep 3+, Tricep 3+.   Sensory: Intact and symmetrical to light touch and pinprick in C2-T1 dermatomes bilaterally.    Cervical Spine:  Cervical spine: Range of motion is mildly reduced with flexion, moderately reduced with extension with mild increased pain on extension, mildly decreased with lateral rotation with mild increased pain on end maneuver to each side.  Spurling's maneuver causes neck pain to either side but no radicular pain.  Myofascial exam: There is minimal tenderness to palpation over the left cervical paraspinous region and into the left rhomboids.      Imaging:  3/22/18 MRI C-spine  C2-C3 demonstrates some similar mild dorsal disc bulge and spurring with mild lateral recess, inferior neural foraminal narrowing left more so than right.  C3-C4 demonstrates a marginal dorsal disc bulge and left paracentral predominantly and annular fissuring.  There is mild to moderate right as well as moderate to significant left neural foraminal narrowing.  C4-C5 demonstrates some marginal dorsal disc bulging and minimal annular fissuring along with mild neural foraminal narrowing left more so than right.  C5-C6 demonstrates some dorsal disc bulge, spurring and annular fissuring.  There is moderate to significant neural foraminal narrowing bilaterally along with facet hypertrophy.  C6-C7 demonstrates some disc space narrowing and broad-based dorsal disc bulge, spurring.  There is moderate to significant neural foraminal  narrowing bilaterally left more so than right area in  C7-T1 demonstrates marginal dorsal disc bulge with moderate right and moderate to significant left neural foraminal narrowing along with posterior element hypertrophy, spurring and similar changes at T1-T2 although incompletely included.  AP canal diameter at C5-6 level is approximately 1 cm.    Assessment:   Patient is a 78-year-old man with a history of type 2 diabetes, hypertension, cervical DDD who initially presented in referral from Dr. Koch for neck pain radiating into the left arm.     1. Cervical radiculopathy     2. Spondylosis of cervical region without myelopathy or radiculopathy     3. DDD (degenerative disc disease), cervical         Plan:  1.  We discussed his current symptoms any feel that his pain is tolerable.  If it worsens in the near future, I will schedule left C4, 5, 6 and 7 diagnostic medial branch blocks.  Otherwise, if he has lasting relief, we can repeat epidural steroid injections in the future if necessary.  We discussed that if his pain returns in his left arm, we will repeat a cervical DARLENE.  He is going to finish physical therapy today and continue to do stretches and exercises on his own.  He will follow up on an as-needed basis.

## 2018-10-31 ENCOUNTER — TELEPHONE (OUTPATIENT)
Dept: GASTROENTEROLOGY | Facility: CLINIC | Age: 78
End: 2018-10-31

## 2018-10-31 NOTE — TELEPHONE ENCOUNTER
----- Message from Paula Edmonds sent at 10/30/2018  9:39 AM CDT -----  Contact: self [phone]  He is being referred to you by Dr Panchal for stomach pains that he has had for a couple of months.  He thinks he should have a colonoscopy. First available is 11/26 with Ms Ambrose.  Please call him about fitting him in.  Thank you!

## 2018-11-29 ENCOUNTER — OFFICE VISIT (OUTPATIENT)
Dept: GASTROENTEROLOGY | Facility: CLINIC | Age: 78
End: 2018-11-29
Payer: MEDICARE

## 2018-11-29 VITALS
DIASTOLIC BLOOD PRESSURE: 65 MMHG | BODY MASS INDEX: 23.25 KG/M2 | SYSTOLIC BLOOD PRESSURE: 157 MMHG | HEIGHT: 74 IN | HEART RATE: 53 BPM | WEIGHT: 181.19 LBS

## 2018-11-29 DIAGNOSIS — R10.84 GENERALIZED ABDOMINAL PAIN: Primary | ICD-10-CM

## 2018-11-29 DIAGNOSIS — R03.0 ELEVATED BLOOD PRESSURE READING: ICD-10-CM

## 2018-11-29 DIAGNOSIS — R00.1 BRADYCARDIA: ICD-10-CM

## 2018-11-29 DIAGNOSIS — R19.4 CHANGE IN BOWEL HABITS: ICD-10-CM

## 2018-11-29 DIAGNOSIS — K59.00 CONSTIPATION, UNSPECIFIED CONSTIPATION TYPE: ICD-10-CM

## 2018-11-29 PROCEDURE — 1101F PT FALLS ASSESS-DOCD LE1/YR: CPT | Mod: CPTII,S$GLB,, | Performed by: NURSE PRACTITIONER

## 2018-11-29 PROCEDURE — 99214 OFFICE O/P EST MOD 30 MIN: CPT | Mod: S$GLB,,, | Performed by: NURSE PRACTITIONER

## 2018-11-29 PROCEDURE — 3077F SYST BP >= 140 MM HG: CPT | Mod: CPTII,S$GLB,, | Performed by: NURSE PRACTITIONER

## 2018-11-29 PROCEDURE — 99999 PR PBB SHADOW E&M-EST. PATIENT-LVL V: CPT | Mod: PBBFAC,,, | Performed by: NURSE PRACTITIONER

## 2018-11-29 PROCEDURE — 3078F DIAST BP <80 MM HG: CPT | Mod: CPTII,S$GLB,, | Performed by: NURSE PRACTITIONER

## 2018-11-29 RX ORDER — POLYETHYLENE GLYCOL 3350 17 G/17G
17 POWDER, FOR SOLUTION ORAL DAILY
COMMUNITY
End: 2019-01-17 | Stop reason: CLARIF

## 2018-11-29 RX ORDER — NEOMYCIN SULFATE, POLYMYXIN B SULFATE AND DEXAMETHASONE 3.5; 10000; 1 MG/ML; [USP'U]/ML; MG/ML
SUSPENSION/ DROPS OPHTHALMIC
Refills: 0 | COMMUNITY
Start: 2018-11-19 | End: 2019-01-17 | Stop reason: ALTCHOICE

## 2018-11-29 RX ORDER — DOXYCYCLINE 50 MG/1
TABLET ORAL
Refills: 1 | COMMUNITY
Start: 2018-11-19 | End: 2019-01-17 | Stop reason: ALTCHOICE

## 2018-11-29 NOTE — PATIENT INSTRUCTIONS
Abdominal Pain    Abdominal pain is pain in the stomach or belly area. Everyone has this pain from time to time. In many cases it goes away on its own. But abdominal pain can sometimes be due to a serious problem, such as appendicitis. So its important to know when to seek help.  Causes of abdominal pain  There are many possible causes of abdominal pain. Common causes in adults include:  · Constipation, diarrhea, or gas  · Stomach acid flowing back up into the esophagus (acid reflux or heartburn)  · Severe acid reflux, called GERD (gastroesophageal reflux disease)  · A sore in the lining of the stomach or small intestine (peptic ulcer)  · Inflammation of the gallbladder, liver, or pancreas  · Gallstones or kidney stones  · Appendicitis   · Intestinal blockage   · An internal organ pushing through a muscle or other tissue (hernia)  · Urinary tract infections  · In women, menstrual cramps, fibroids, or endometriosis  · Inflammation or infection of the intestines  Diagnosing the cause of abdominal pain  Your healthcare provider will do a physical exam help find the cause of your pain. If needed, tests will be ordered. Belly pain has many possible causes. So it can be hard to find the reason for your pain. Giving details about your pain can help. Tell your provider where and when you feel the pain, and what makes it better or worse. Also let your provider know if you have other symptoms such as:  · Fever  · Tiredness  · Upset stomach (nausea)  · Vomiting  · Changes in bathroom habits  Treating abdominal pain  Some causes of pain need emergency medical treatment right away. These include appendicitis or a bowel blockage. Other problems can be treated with rest, fluids, or medicines. Your healthcare provider can give you specific instructions for treatment or self-care based on what is causing your pain.  If you have vomiting or diarrhea, sip water or other clear fluids. When you are ready to eat solid foods again,  start with small amounts of easy-to-digest, low-fat foods. These include apple sauce, toast, or crackers.   When to seek medical care  Call 911 or go to the hospital right away if you:  · Cant pass stool and are vomiting  · Are vomiting blood or have bloody diarrhea or black, tarry diarrhea  · Have chest, neck, or shoulder pain  · Feel like you might pass out  · Have pain in your shoulder blades with nausea  · Have sudden, severe belly pain  · Have new, severe pain unlike any you have felt before  · Have a belly that is rigid, hard, and tender to touch  Call your healthcare provider if you have:  · Pain for more than 5 days  · Bloating for more than 2 days  · Diarrhea for more than 5 days  · A fever of 100.4°F (38.0°C) or higher, or as directed by your provider  · Pain that gets worse  · Weight loss for no reason  · Continued lack of appetite  · Blood in your stool  How to prevent abdominal pain  Here are some tips to help prevent abdominal pain:  · Eat smaller amounts of food at one time.  · Avoid greasy, fried, or other high-fat foods.  · Avoid foods that give you gas.  · Exercise regularly.  · Drink plenty of fluids.  To help prevent GERD symptoms:  · Quit smoking.  · Reduce alcohol and certain foods that increase stomach acid.  · Avoid aspirin and over-the-counter pain and fever medicines (NSAIDS or nonsteroidal anti-inflammatory drugs), if possible  · Lose extra weight.  · Finish eating at least 2 hours before you go to bed or lie down.  · Raise the head of your bed.  Date Last Reviewed: 7/1/2016  © 0415-0248 [employer]. [address] [place] [address], [place] [address]. All rights reserved. This information is not intended as a substitute for professional medical care. Always follow your healthcare professional's instructions.

## 2019-01-18 ENCOUNTER — HOSPITAL ENCOUNTER (OUTPATIENT)
Facility: HOSPITAL | Age: 79
Discharge: HOME | End: 2019-01-18
Attending: INTERNAL MEDICINE | Admitting: INTERNAL MEDICINE
Payer: MEDICARE

## 2019-01-18 ENCOUNTER — ANESTHESIA (OUTPATIENT)
Dept: ENDOSCOPY | Facility: HOSPITAL | Age: 79
End: 2019-01-18
Payer: MEDICARE

## 2019-01-18 ENCOUNTER — ANESTHESIA EVENT (OUTPATIENT)
Dept: ENDOSCOPY | Facility: HOSPITAL | Age: 79
End: 2019-01-18
Payer: MEDICARE

## 2019-01-18 VITALS
OXYGEN SATURATION: 99 % | HEART RATE: 51 BPM | DIASTOLIC BLOOD PRESSURE: 70 MMHG | HEIGHT: 74 IN | RESPIRATION RATE: 13 BRPM | TEMPERATURE: 98 F | WEIGHT: 175 LBS | SYSTOLIC BLOOD PRESSURE: 137 MMHG | BODY MASS INDEX: 22.46 KG/M2

## 2019-01-18 DIAGNOSIS — R10.9 ABDOMINAL PAIN: ICD-10-CM

## 2019-01-18 LAB — GLUCOSE SERPL-MCNC: 75 MG/DL (ref 70–110)

## 2019-01-18 PROCEDURE — 88305 TISSUE EXAM BY PATHOLOGIST: CPT | Mod: 26,,, | Performed by: PATHOLOGY

## 2019-01-18 PROCEDURE — 37000009 HC ANESTHESIA EA ADD 15 MINS: Mod: PO | Performed by: INTERNAL MEDICINE

## 2019-01-18 PROCEDURE — D9220A PRA ANESTHESIA: ICD-10-PCS | Mod: CRNA,,, | Performed by: NURSE ANESTHETIST, CERTIFIED REGISTERED

## 2019-01-18 PROCEDURE — 27201089 HC SNARE, DISP (ANY): Mod: PO | Performed by: INTERNAL MEDICINE

## 2019-01-18 PROCEDURE — 45385 COLONOSCOPY W/LESION REMOVAL: CPT | Mod: ,,, | Performed by: INTERNAL MEDICINE

## 2019-01-18 PROCEDURE — 45385 COLONOSCOPY W/LESION REMOVAL: CPT | Mod: PO | Performed by: INTERNAL MEDICINE

## 2019-01-18 PROCEDURE — 45385 PR COLONOSCOPY,REMV LESN,SNARE: ICD-10-PCS | Mod: ,,, | Performed by: INTERNAL MEDICINE

## 2019-01-18 PROCEDURE — 88305 TISSUE EXAM BY PATHOLOGIST: CPT | Performed by: PATHOLOGY

## 2019-01-18 PROCEDURE — D9220A PRA ANESTHESIA: Mod: CRNA,,, | Performed by: NURSE ANESTHETIST, CERTIFIED REGISTERED

## 2019-01-18 PROCEDURE — 88305 TISSUE SPECIMEN TO PATHOLOGY - SURGERY: ICD-10-PCS | Mod: 26,,, | Performed by: PATHOLOGY

## 2019-01-18 PROCEDURE — 25000003 PHARM REV CODE 250: Mod: PO | Performed by: INTERNAL MEDICINE

## 2019-01-18 PROCEDURE — 37000008 HC ANESTHESIA 1ST 15 MINUTES: Mod: PO | Performed by: INTERNAL MEDICINE

## 2019-01-18 PROCEDURE — 63600175 PHARM REV CODE 636 W HCPCS: Mod: PO | Performed by: NURSE ANESTHETIST, CERTIFIED REGISTERED

## 2019-01-18 PROCEDURE — D9220A PRA ANESTHESIA: ICD-10-PCS | Mod: ANES,,, | Performed by: ANESTHESIOLOGY

## 2019-01-18 PROCEDURE — D9220A PRA ANESTHESIA: Mod: ANES,,, | Performed by: ANESTHESIOLOGY

## 2019-01-18 RX ORDER — PROPOFOL 10 MG/ML
VIAL (ML) INTRAVENOUS
Status: DISCONTINUED | OUTPATIENT
Start: 2019-01-18 | End: 2019-01-18

## 2019-01-18 RX ORDER — SODIUM CHLORIDE 0.9 % (FLUSH) 0.9 %
3 SYRINGE (ML) INJECTION
Status: DISCONTINUED | OUTPATIENT
Start: 2019-01-18 | End: 2019-01-18 | Stop reason: HOSPADM

## 2019-01-18 RX ORDER — LIDOCAINE HCL/PF 100 MG/5ML
SYRINGE (ML) INTRAVENOUS
Status: DISCONTINUED | OUTPATIENT
Start: 2019-01-18 | End: 2019-01-18

## 2019-01-18 RX ORDER — PROPOFOL 10 MG/ML
VIAL (ML) INTRAVENOUS CONTINUOUS PRN
Status: DISCONTINUED | OUTPATIENT
Start: 2019-01-18 | End: 2019-01-18

## 2019-01-18 RX ORDER — SODIUM CHLORIDE, SODIUM LACTATE, POTASSIUM CHLORIDE, CALCIUM CHLORIDE 600; 310; 30; 20 MG/100ML; MG/100ML; MG/100ML; MG/100ML
INJECTION, SOLUTION INTRAVENOUS CONTINUOUS
Status: DISCONTINUED | OUTPATIENT
Start: 2019-01-18 | End: 2019-01-18 | Stop reason: HOSPADM

## 2019-01-18 RX ADMIN — PROPOFOL 150 MCG/KG/MIN: 10 INJECTION, EMULSION INTRAVENOUS at 08:01

## 2019-01-18 RX ADMIN — SODIUM CHLORIDE, SODIUM LACTATE, POTASSIUM CHLORIDE, AND CALCIUM CHLORIDE: .6; .31; .03; .02 INJECTION, SOLUTION INTRAVENOUS at 07:01

## 2019-01-18 RX ADMIN — PROPOFOL 100 MG: 10 INJECTION, EMULSION INTRAVENOUS at 08:01

## 2019-01-18 RX ADMIN — LIDOCAINE HYDROCHLORIDE 100 MG: 20 INJECTION, SOLUTION INTRAVENOUS at 08:01

## 2019-01-18 NOTE — DISCHARGE INSTRUCTIONS
Recovery After Procedural Sedation (Adult)  You have been given medicine by vein to make you sleep during your surgery. This may have included both a pain medicine and sleeping medicine. Most of the effects have worn off. But you may still have some drowsiness for the next 6 to 8 hours.  Home care  Follow these guidelines when you get home:  · For the next 8 hours, you should be watched by a responsible adult. This person should make sure your condition is not getting worse.  · Don't drink any alcohol for the next 24 hours.  · Don't drive, operate dangerous machinery, or make important business or personal decisions during the next 24 hours.  Note: Your healthcare provider may tell you not to take any medicine by mouth for pain or sleep in the next 4 hours. These medicines may react with the medicines you were given in the hospital. This could cause a much stronger response than usual.  Follow-up care  Follow up with your healthcare provider if you are not alert and back to your usual level of activity within 12 hours.  When to seek medical advice  Call your healthcare provider right away if any of these occur:  · Drowsiness gets worse  · Weakness or dizziness gets worse  · Repeated vomiting  · You can't be awakened   Date Last Reviewed: 10/18/2016  © 0116-3858 [employer]. [address] [place] [address], [place] [address]. All rights reserved. This information is not intended as a substitute for professional medical care. Always follow your healthcare professional's instructions.      PROBIOTICS:  Now that your colon is so cleaned out, now is a good time for a round of PROBIOTICS.  Take a  Probiotic product such as Align or Culturelle or Rosenda-Q, every day for a month.                  (The products listed are non-prescription, but you may need to ask the pharmacist for their location.)  Repeat this 4-6 times a year.        High-Fiber Diet  Fiber is in fruits, vegetables, cereals, and grains. Fiber passes  through your body undigested. A high-fiber diet helps food move through your intestinal tract. The added bulk is helpful in preventing constipation. In people with diverticulosis, fiber helps clean out the pouches along the colon wall. It also prevents new pouches from forming. A high-fiber diet reduces the risk of colon cancer. It also lowers blood cholesterol and prevents high blood sugar in people with diabetes.    The fiber-rich foods listed below should be part of your diet. If you are not used to high-fiber foods, start with 1 or 2 foods from this list. Every 3 to 4 days add a new one to your diet. Do this until you are eating 4 high-fiber foods per day. This should give you 20 to 35 grams of fiber a day. It is also important to drink a lot of water when you are on this diet. You should have 6 to 8 glasses of water a day. Water makes the fiber swell and increases the benefit.  Foods high in dietary fiber  The following foods are high in dietary fiber:  · Breads. Breads made with 100% whole-wheat flour; anika, wheat, or rye crackers; whole-grain tortillas, bran muffins.  · Cereals. Whole-grain and bran cereals with bran (shredded wheat, wheat flakes, raisin bran, corn bran); oatmeal, rolled oats, granola, and brown rice.  · Fruits. Fresh fruits and their edible skins (pears, prunes, raisins, berries, apples, and apricots); bananas, citrus fruit, mangoes, pineapple; and prune juice.  · Nuts. Any nuts and seeds.  · Vegetables. Best served raw or lightly cooked. All types, especially: green peas, celery, eggplant, potatoes, spinach, broccoli, [place] sprouts, winter squash, carrots, cauliflower, soybeans, lentils, and fresh and dried beans of all kinds.  · Other. Popcorn, any spices.  Date Last Reviewed: 8/1/2016  © 7765-1467 [employer]. [address] [place] [address], [place] [address]. All rights reserved. This information is not intended as a substitute for professional medical care. Always follow your  healthcare professional's instructions.

## 2019-01-18 NOTE — [employer] PATIENT INSTRUCTIONS
Discharge Summary/Instructions for after Colonoscopy with   Biopsy/Polypectomy  Carolann Waite    Friday, January 18, 2019  Ron Ricci MD  RESTRICTIONS ON ACTIVITY:  - Do not drive a car or operate machinery until the day after the procedure.      - The following day: return to full activity including work.  - For  3 days: No heavy lifting, straining or running.  - Diet: You can have solid foods, but no gassy foods (i.e. beans, broccoli,   cabbage, etc).  TREATMENT FOR COMMON SIDE EFFECTS:  - Mild abdominal pain and bloating or excessive gas: rest, eat lightly and   use a heating pad.  SYMPTOMS TO WATCH FOR AND REPORT TO YOUR PHYSICIAN:  1. Severe abdominal pain.  2. Fever within 24 hours after a procedure.  3. A large amount of rectal bleeding. (A small amount of blood from the   rectum is not serious, especially if hemorrhoids are present.  3.  Because air was put into your colon during the procedure, expelling   large amounts of air from your rectum is normal.  4.  You may not have a bowel movement for 1-3 days because of the   colonoscopy prep.  This is normal.  5.  Call immediately if you notice any of the following:   Chills and/or fever over 101   Persistent vomiting   Severe abdominal pain, other than gas cramps   Severe chest pain   Black, tarry stools   Any bleeding - exceeding one tablespoon  Your doctor recommends these additional instructions:  We are waiting for your pathology results.   Your physician has recommended a repeat colonoscopy in five years for   surveillance.   Eat a high fiber diet.   Take a fiber supplement, for example Citrucel, Fibercon, Konsyl or   Metamucil.   Take Miralax 1 capful (17 grams) in 8 ounces of water by mouth once a day as   needed.   Take a PROBIOTIC, such as ALIGN or CULTURELLE or HELLEN-Q (all   non-prescription), every day for a month.   And repeat this 4-6 times a   year.  Call the [facility] clinic in 2 weeks for reports (if you haven't heard from us   sooner)   [phone].  None  If you have any questions or problems, please call your physician.  EMERGENCY PHONE NUMBER: [phone]  LAB RESULTS: Call in two (2) weeks for lab results, [phone]  ___________________________________________  Nurse Signature  ___________________________________________  Patient/Designated Responsible Party Signature  Ron Ricci MD  1/18/2019 9:30:35 AM  This report has been verified and signed electronically.  [employer]

## 2019-01-18 NOTE — ANESTHESIA PREPROCEDURE EVALUATION
01/18/2019  Carolann Waite is a 78 y.o., male.    Anesthesia Evaluation    I have reviewed the Patient Summary Reports.    I have reviewed the Nursing Notes.      Review of Systems  Cardiovascular:   Hypertension, well controlled    Hepatic/GI:   GERD, well controlled    Endocrine:   Diabetes, well controlled, type 2        Physical Exam  General:  Well nourished    Airway/Jaw/Neck:  Airway Findings: Mallampati: II                Anesthesia Plan  Type of Anesthesia, risks & benefits discussed:  Anesthesia Type:  general  Patient's Preference:   Intra-op Monitoring Plan:   Intra-op Monitoring Plan Comments:   Post Op Pain Control Plan:   Post Op Pain Control Plan Comments:   Induction:   IV  Beta Blocker:  Patient is not currently on a Beta-Blocker (No further documentation required).       Informed Consent: Patient understands risks and agrees with Anesthesia plan.  Questions answered. Anesthesia consent signed with patient.  ASA Score: 2     Day of Surgery Review of History & Physical:    H&P update referred to the surgeon.         Ready For Surgery From Anesthesia Perspective.

## 2019-01-18 NOTE — ANESTHESIA POSTPROCEDURE EVALUATION
"Anesthesia Post Evaluation    Patient: Carolann R Waite    Procedure(s) Performed: Procedure(s) (LRB):  COLONOSCOPY (N/A)    Final Anesthesia Type: general  Patient location during evaluation: PACU  Patient participation: Yes- Able to Participate  Level of consciousness: awake and alert, oriented and awake  Post-procedure vital signs: reviewed and stable  Pain management: adequate  Airway patency: patent  PONV status at discharge: No PONV  Anesthetic complications: no      Cardiovascular status: blood pressure returned to baseline and hemodynamically stable  Respiratory status: unassisted, spontaneous ventilation and room air  Hydration status: euvolemic  Follow-up not needed.        Visit Vitals  BP (!) 99/53 (BP Location: Left arm, Patient Position: Lying)   Pulse (!) 49   Temp 36.6 °C (97.9 °F) (Skin)   Resp 14   Ht 6' 2" (1.88 m)   Wt 79.4 kg (175 lb)   SpO2 95%   BMI 22.47 kg/m²       Pain/Prudence Score: No Data Recorded      "

## 2019-01-18 NOTE — PLAN OF CARE
Pt meets discharge criteria. Tolerating PO liquids. PIV removed.  Pressure dressing applied.  Discharge instructions reviewed with patient and spouse.  All questions answered.  D/C via wheelchair to personal vehicle.

## 2019-01-18 NOTE — TRANSFER OF CARE
"Anesthesia Transfer of Care Note    Patient: Carolann Waite    Procedure(s) Performed: Procedure(s) (LRB):  COLONOSCOPY (N/A)    Patient location: PACU    Anesthesia Type: general    Transport from OR: Transported from OR on room air with adequate spontaneous ventilation    Post pain: adequate analgesia    Post assessment: no apparent anesthetic complications    Post vital signs: stable    Level of consciousness: awake, alert and oriented    Nausea/Vomiting: no nausea/vomiting    Complications: none    Transfer of care protocol was followed      Last vitals:   Visit Vitals  BP (!) 99/53 (BP Location: Left arm, Patient Position: Lying)   Pulse (!) 49   Temp 36.6 °C (97.9 °F) (Skin)   Resp 14   Ht 6' 2" (1.88 m)   Wt 79.4 kg (175 lb)   SpO2 95%   BMI 22.47 kg/m²     "

## 2019-01-18 NOTE — BRIEF OP NOTE
Discharge Note  Short Stay      SUMMARY     Admit Date: 1/18/2019    Attending Physician: Ron Ricci Jr., MD     Discharge Physician: Ron Ricci Jr., MD    Discharge Date: 1/18/2019 9:34 AM    Final Diagnosis: Lower abdominal pain [R10.30]  Change in bowel habit [R19.4]  Impression:          - Two 2 to 3 mm polyps at the hepatic flexure,                        removed with a cold snare. Resected and retrieved.                       - One 2 to 3 mm polyp in the mid descending colon,                        removed with a cold snare. Resected and retrieved.                       - Diverticulosis in the sigmoid colon.                       - Mild colonic spasm consistent with irritable bowel                        syndrome.                       - Non-bleeding internal hemorrhoids.                       - The examination was otherwise normal.                       - The examined portion of the ileum was normal.  Recommendation:      - Discharge patient to home.                       - Await pathology results.                       - Repeat colonoscopy in 5 years for surveillance.                       - High fiber diet.                       - Use fiber, for example Citrucel, Fibercon, Konsyl                        or Metamucil.                       - Take a PROBIOTIC, such as ALIGN or CULTURELLE or                        HELLEN-Q (all non-prescription), every day for a                        month.                       - Miralax 1 capful (17 grams) in 8 ounces of water                        PO daily PRN.                       - Call the [facility] clinic in 2 weeks for reports (if                        you haven't heard from us sooner) [phone].                       - Continue present medications.                       - Patient has a contact number available for                        emergencies. The signs and symptoms of potential                        delayed complications were discussed with the                         patient. Return to normal activities tomorrow.                        Written discharge instructions were provided to the                        patient.                       - Return to normal activities tomorrow.  Ron Ricci MD  1/18/2019   Disposition: HOME OR SELF CARE    Patient Instructions:   Current Discharge Medication List      CONTINUE these medications which have NOT CHANGED    Details   ACCU-CHEK MILENA PLUS METER Misc USE AS DIRECTED  Qty: 1 each, Refills: 1      ACCU-CHEK MILENA PLUS TEST STRP Strp CHECK BLOOD SUGAR TWICE DAILY  Qty: 200 strip, Refills: 11      ACCU-CHEK SOFTCLIX LANCETS Misc       amLODIPine (NORVASC) 10 MG tablet TAKE 1 TABLET EVERY DAY  Qty: 90 tablet, Refills: 0      atorvastatin (LIPITOR) 20 MG tablet TAKE 1 TABLET EVERY DAY  Qty: 90 tablet, Refills: 2      etodolac (LODINE) 200 MG Cap Take 1 capsule (200 mg total) by mouth every 8 (eight) hours as needed. To use for gout attack related pain instead of indocin.  Qty: 90 capsule, Refills: 1      fexofenadine (ALLEGRA) 180 MG tablet Take 1 tablet by mouth daily as needed.       Lactobacillus rhamnosus GG (CULTURELLE) 10 billion cell capsule Take 1 capsule by mouth once daily.      losartan (COZAAR) 100 MG tablet Take 100 mg by mouth once daily.      pantoprazole (PROTONIX) 40 MG tablet TAKE 1 TABLET EVERY DAY  Qty: 90 tablet, Refills: 0    Associated Diagnoses: Gastroesophageal reflux disease, esophagitis presence not specified      SITagliptin (JANUVIA) 100 MG Tab Take 100 mg by mouth once daily.      triamcinolone acetonide 0.1% (KENALOG) 0.1 % cream       acyclovir (ZOVIRAX) 400 MG tablet Refills: 1      fluticasone (FLONASE) 50 mcg/actuation nasal spray 2 sprays by Nasal route daily as needed. 1 - 2 Spray(s) Nasal daily.  Qty: 16 g, Refills: 2             Discharge Procedure Orders (must include Diet, Follow-up, Activity)    Follow Up:  Follow up with PCP as per your routine.  Please follow a high  fiber diet.  Activity as tolerated.    No driving day of procedure.    PROBIOTICS:  Now that your colon is so cleaned out, now is a good time for a round of PROBIOTICS.  Take a  Probiotic product such as Align or Culturelle or Rosenda-Q, every day for a month.                  (The products listed are non-prescription, but you may need to ask the pharmacist for their location.)  Repeat this 4-6 times a year.

## 2019-01-18 NOTE — H&P
History & Physical - Short Stay  Gastroenterology      SUBJECTIVE:     Procedure: Colonoscopy    Chief Complaint/Indication for Procedure: Abdo pain.  Change in bowel habits, worsening constipation.    History of Present Illness:  Asymptomatic  Office Visit     11/29/2018  [place] - Gastroenterology      [facility]   Gastroenterology   Generalized abdominal pain +4 more   Dx   Abdominal Pain     Reason for Visit    Progress Notes        Subjective:       Patient ID: Carolann Waite is a 78 y.o. male Body mass index is 23.27 kg/m².     Chief Complaint: Abdominal Pain (constipation)     This patient is new to me.  Established patient of Dr. Ricci (last in 2014).     Abdominal Pain   This is a chronic problem. The current episode started more than 1 year ago (started several years ago with irregular bowel movements of constipation and diarrhea). The problem occurs every several days (2-3 times a week; depends on if he has had a good bowel movement). Duration: until he has a bowel movement. The problem has been gradually improving. The pain is located in the generalized abdominal region (mostly lower abdomen). The pain is at a severity of 0/10 (currently). The quality of the pain is cramping (discomfort). Associated symptoms include belching (not more than usual), constipation (bowel movements once daily to once every other day of small amounts; stool rated 4 on bristol stool scale, miralax has helped) and flatus (not more than usual). Pertinent negatives include no anorexia, diarrhea (has resolved), dysuria, fever, hematochezia, melena, nausea, vomiting or weight loss. Associated symptoms comments: Reports change in bowel habits to constipation. Nothing aggravates the pain. The pain is relieved by bowel movements. Treatments tried: miralax daily, protonix 40 mg once daily, probiotic daily. Prior diagnostic workup includes ultrasound. His past medical history is significant for GERD (controlled on PPI).  "There is no history of Crohn's disease, gallstones, pancreatitis, PUD or ulcerative colitis.     10/8/14 Colonoscopy was reviewed and procedure report states:   "Findings:       The perianal and digital rectal examinations were normal. Pertinent        negatives include normal sphincter tone, no palpable rectal lesions        and normal prostate (size, shape, and consistency).       Internal hemorrhoids were found during retroflexion and were small.       No additional abnormalities were found on retroflexion.       The rectum and recto-sigmoid colon appeared normal.       Rare (single?) small-mouthed diverticula were found in the sigmoid        colon.       The exam was otherwise without abnormality.       .       The terminal ileum appeared normal.  Impression:  - Internal hemorrhoids.                       - Diverticulosis in the sigmoid colon.                       - The examination was otherwise normal.                       - The examined portion of the ileum was normal.  Recommendation:      - Discharge patient to home.                       - High fiber diet.                       - Repeat colonoscopy in 6-7 years for surveillance.                       - Continue present medications.                       - Patient has a contact number available for                        emergencies. The signs and symptoms of potential                        delayed complications were discussed with the                        patient. Return to normal activities tomorrow.                        Written discharge instructions were provided to the                        patient.                       - Return to normal activities tomorrow. ".    Assessment:       1. Generalized abdominal pain    2. Constipation, unspecified constipation type    3. Change in bowel habits    4. Elevated blood pressure reading    5. Bradycardia        Plan:       Generalized abdominal pain  - schedule Colonoscopy, discussed procedure with the " patient, patient verbalized understanding  - Possible CT SCAN pending results of testing and if symptoms persist     Constipation, unspecified constipation type & Change in bowel habits  - schedule Colonoscopy, discussed procedure with the patient, patient verbalized understanding  Recommended daily exercise as tolerated, adequate water intake (six 8-oz glasses of water daily), and high fiber diet. OTC fiber supplements are recommended if diet does not reach daily fiber goal (25 grams daily), such as Metamucil, Citrucel, or FiberCon (take as directed, separate from other oral medications by >2 hours).  -Recommend taking an OTC stool softener such as Colace as directed to avoid hard stools and straining with bowel movements PRN  - CONTINUE GLYCOLAX/OTC MiraLax once daily (17g PO) as directed  - If no improvement with above recommendations, try intermittently dosed Dulcolax OTC as directed (every 3-4  days) PRN to facilitate bowel movements  -If still no improvement with these measures, call/follow-up  - Possible TRIAL OF LINZESS pending results of testing and if symptoms persist  - CONTINUE OTC probiotic, such as Align or Culturelle, as directed  - avoid lactose     Elevated blood pressure reading & Bradycardia  - requested staff to repeat BP & HR  - instructed patient to monitor blood pressure at home and follow-up with PCP &/or cardiologist  - If blood pressure remains elevated and/or experiencing symptoms of headache, chest pain, shortness of breath, and/or blurred vision, recommend going to ER for further evaluation and management     Follow-up in about 1 month (around 12/29/2018), or if symptoms worsen or fail to improve.            Impression       Sonographically normal abdomen.    Electronically signed by: Kevin Matthews MD  Date: 10/26/2018         PTA Medications   Medication Sig    ACCU-CHEK MILENA PLUS METER Misc USE AS DIRECTED    ACCU-CHEK MILENA PLUS TEST STRP Strp CHECK BLOOD SUGAR TWICE DAILY     ACCU-CHEK SOFTCLIX LANCETS Misc     amLODIPine (NORVASC) 10 MG tablet TAKE 1 TABLET EVERY DAY    atorvastatin (LIPITOR) 20 MG tablet TAKE 1 TABLET EVERY DAY    etodolac (LODINE) 200 MG Cap Take 1 capsule (200 mg total) by mouth every 8 (eight) hours as needed. To use for gout attack related pain instead of indocin.    fexofenadine (ALLEGRA) 180 MG tablet Take 1 tablet by mouth daily as needed.     Lactobacillus rhamnosus GG (CULTURELLE) 10 billion cell capsule Take 1 capsule by mouth once daily.    losartan (COZAAR) 100 MG tablet Take 100 mg by mouth once daily.    pantoprazole (PROTONIX) 40 MG tablet TAKE 1 TABLET EVERY DAY    SITagliptin (JANUVIA) 100 MG Tab Take 100 mg by mouth once daily.    triamcinolone acetonide 0.1% (KENALOG) 0.1 % cream     acyclovir (ZOVIRAX) 400 MG tablet     fluticasone (FLONASE) 50 mcg/actuation nasal spray 2 sprays by Nasal route daily as needed. 1 - 2 Spray(s) Nasal daily.       Review of patient's allergies indicates:   Allergen Reactions    No known drug allergies         Past Medical History:   Diagnosis Date    Arthritis     Cataract     OU    Colon polyps     Diabetes     Diabetes mellitus, type 2     Diverticulosis     Gout     HTN (hypertension)     Hyperlipidemia LDL goal <100     Impotence     Melanoma     Left forearm, s/p excision    Squamous cell carcinoma     Head/nose     Past Surgical History:   Procedure Laterality Date    cancer of skin (nose)  2/2014    basal cell ca    CIRCUMCISION, PRIMARY      COLONOSCOPY  7/31/2009  Aubrey    Normal colon and TI.    COLONOSCOPY  10/08/2014    Dr. Ricci, repeat in 6-7 years    COLONOSCOPY N/A 10/8/2014    [facility]    COLONOSCOPY W/ POLYPECTOMY  8/7/2006  Aubrey    Three cecal polyps, largest 4mm x 12mm.  TUBULAR ADENOMAS.  One 2 mm in the hepatic flexure.  TUBULAR ADENOMAS.    FLEXIBLE SIGMOIDOSCOPY  ~2001  Chris    Injection,steroid,epidural,transforaminal  "approach C3/4 Left 9/27/2018    [facility] OR    Injection-steroid-epidural-cervical N/A 8/15/2018    [facility] at [facility] OR    INJECTION-STEROID-EPIDURAL-CERVICAL N/A 4/30/2018    [facility] at [facility] OR    removal of lipoma      R arm    removal of melanoma  2009    L arm    TONSILLECTOMY      UPPER GASTROINTESTINAL ENDOSCOPY  in his 30's    normal findings per patient report     Family History   Problem Relation Age of Onset    Diabetes Mother     Heart disease Brother 56        mi    Cancer Brother         Prostate    Heart attack Brother     Cancer Father         lymphoma    Hypertension Father     Nephrolithiasis Father     Colon cancer Neg Hx     Colon polyps Neg Hx     Crohn's disease Neg Hx     Ulcerative colitis Neg Hx     Stomach cancer Neg Hx     Esophageal cancer Neg Hx      Social History     Tobacco Use    Smoking status: Current Some Day Smoker     Types: Cigars    Smokeless tobacco: Never Used    Tobacco comment: smokes a cigar on occasion   Substance Use Topics    Alcohol use: Yes     Comment: 1 drink per month    Drug use: No         OBJECTIVE:     Vital Signs (Most Recent)  Temp: 97.9 °F (36.6 °C) (01/18/19 0754)  Pulse: 60 (01/18/19 0754)  Resp: 18 (01/18/19 0754)  BP: (!) 161/72 (01/18/19 0754)  SpO2: 97 % (01/18/19 0754)    Physical Exam:  :  Ht 6' 2" (1.88 m)   Wt 82.2 kg (181 lb 3.5 oz)   BMI 23.27 kg/m²  GENERAL:  Comfortable, in no acute distress.                        HEENT EXAM:  Nonicteric.  No adenopathy.  Oropharynx is clear.               NECK:  Supple.                                                               LUNGS:  Clear.                                                               CARDIAC:  Regular rate and rhythm.  S1, S2.  No murmur.     ABDOMEN:  Soft, positive bowel sounds, nontender.  No hepatosplenomegaly or masses.  No rebound or guarding.           EXTREMITIES:  " No edema.     MENTAL STATUS:  Alert and oriented.    ASSESSMENT/PLAN:     Assessment: Abdo pain.  Change in bowel habits, worsening constipation.    Plan: Colonoscopy    Anesthesia Plan:   MAC / General Anaesthesia    ASA Grade: ASA 2 - Patient with mild systemic disease with no functional limitations    MALLAMPATI SCORE: I (soft palate, uvula, fauces, and tonsillar pillars visible)  .

## 2019-01-29 ENCOUNTER — TELEPHONE (OUTPATIENT)
Dept: GASTROENTEROLOGY | Facility: CLINIC | Age: 79
End: 2019-01-29

## 2021-01-14 ENCOUNTER — IMMUNIZATION (OUTPATIENT)
Dept: PRIMARY CARE CLINIC | Facility: CLINIC | Age: 81
End: 2021-01-14
Payer: MEDICARE

## 2021-01-14 DIAGNOSIS — Z23 NEED FOR VACCINATION: ICD-10-CM

## 2021-01-14 PROCEDURE — 91300 COVID-19, MRNA, LNP-S, PF, 30 MCG/0.3 ML DOSE VACCINE: CPT | Mod: S$GLB,,, | Performed by: FAMILY MEDICINE

## 2021-01-14 PROCEDURE — 0001A COVID-19, MRNA, LNP-S, PF, 30 MCG/0.3 ML DOSE VACCINE: ICD-10-PCS | Mod: S$GLB,,, | Performed by: FAMILY MEDICINE

## 2021-01-14 PROCEDURE — 0001A COVID-19, MRNA, LNP-S, PF, 30 MCG/0.3 ML DOSE VACCINE: CPT | Mod: S$GLB,,, | Performed by: FAMILY MEDICINE

## 2021-01-14 PROCEDURE — 91300 COVID-19, MRNA, LNP-S, PF, 30 MCG/0.3 ML DOSE VACCINE: ICD-10-PCS | Mod: S$GLB,,, | Performed by: FAMILY MEDICINE

## 2021-02-04 ENCOUNTER — IMMUNIZATION (OUTPATIENT)
Dept: PRIMARY CARE CLINIC | Facility: CLINIC | Age: 81
End: 2021-02-04
Payer: MEDICARE

## 2021-02-04 DIAGNOSIS — Z23 NEED FOR VACCINATION: Primary | ICD-10-CM

## 2021-02-04 PROCEDURE — 91300 COVID-19, MRNA, LNP-S, PF, 30 MCG/0.3 ML DOSE VACCINE: ICD-10-PCS | Mod: S$GLB,,, | Performed by: FAMILY MEDICINE

## 2021-02-04 PROCEDURE — 0002A COVID-19, MRNA, LNP-S, PF, 30 MCG/0.3 ML DOSE VACCINE: CPT | Mod: S$GLB,,, | Performed by: FAMILY MEDICINE

## 2021-02-04 PROCEDURE — 0002A COVID-19, MRNA, LNP-S, PF, 30 MCG/0.3 ML DOSE VACCINE: ICD-10-PCS | Mod: S$GLB,,, | Performed by: FAMILY MEDICINE

## 2021-02-04 PROCEDURE — 91300 COVID-19, MRNA, LNP-S, PF, 30 MCG/0.3 ML DOSE VACCINE: CPT | Mod: S$GLB,,, | Performed by: FAMILY MEDICINE

## 2021-08-02 ENCOUNTER — TELEPHONE (OUTPATIENT)
Dept: UROLOGY | Facility: CLINIC | Age: 81
End: 2021-08-02

## 2021-09-08 ENCOUNTER — TELEPHONE (OUTPATIENT)
Dept: UROLOGY | Facility: CLINIC | Age: 81
End: 2021-09-08

## 2021-10-18 RX ORDER — CYCLOSPORINE 0.5 MG/ML
1 EMULSION OPHTHALMIC 2 TIMES DAILY
COMMUNITY
Start: 2021-06-22

## 2021-10-18 RX ORDER — CEPHALEXIN 500 MG/1
CAPSULE ORAL
COMMUNITY
Start: 2021-07-30 | End: 2021-10-19

## 2021-10-19 ENCOUNTER — OFFICE VISIT (OUTPATIENT)
Dept: UROLOGY | Facility: CLINIC | Age: 81
End: 2021-10-19
Payer: MEDICARE

## 2021-10-19 VITALS
BODY MASS INDEX: 21.64 KG/M2 | SYSTOLIC BLOOD PRESSURE: 154 MMHG | HEIGHT: 74 IN | DIASTOLIC BLOOD PRESSURE: 78 MMHG | WEIGHT: 168.63 LBS | HEART RATE: 82 BPM

## 2021-10-19 DIAGNOSIS — N48.6 PEYRONIE DISEASE: ICD-10-CM

## 2021-10-19 DIAGNOSIS — R39.12 BENIGN PROSTATIC HYPERPLASIA WITH WEAK URINARY STREAM: ICD-10-CM

## 2021-10-19 DIAGNOSIS — N40.1 BENIGN PROSTATIC HYPERPLASIA WITH WEAK URINARY STREAM: ICD-10-CM

## 2021-10-19 DIAGNOSIS — Z80.42 FAMILY HX OF PROSTATE CANCER: ICD-10-CM

## 2021-10-19 DIAGNOSIS — N52.01 ERECTILE DYSFUNCTION DUE TO ARTERIAL INSUFFICIENCY: Primary | ICD-10-CM

## 2021-10-19 PROCEDURE — 99999 PR PBB SHADOW E&M-EST. PATIENT-LVL III: CPT | Mod: PBBFAC,,, | Performed by: UROLOGY

## 2021-10-19 PROCEDURE — 1159F PR MEDICATION LIST DOCUMENTED IN MEDICAL RECORD: ICD-10-PCS | Mod: CPTII,S$GLB,, | Performed by: UROLOGY

## 2021-10-19 PROCEDURE — 1160F RVW MEDS BY RX/DR IN RCRD: CPT | Mod: CPTII,S$GLB,, | Performed by: UROLOGY

## 2021-10-19 PROCEDURE — 1101F PT FALLS ASSESS-DOCD LE1/YR: CPT | Mod: CPTII,S$GLB,, | Performed by: UROLOGY

## 2021-10-19 PROCEDURE — 3288F PR FALLS RISK ASSESSMENT DOCUMENTED: ICD-10-PCS | Mod: CPTII,S$GLB,, | Performed by: UROLOGY

## 2021-10-19 PROCEDURE — 3288F FALL RISK ASSESSMENT DOCD: CPT | Mod: CPTII,S$GLB,, | Performed by: UROLOGY

## 2021-10-19 PROCEDURE — 1126F PR PAIN SEVERITY QUANTIFIED, NO PAIN PRESENT: ICD-10-PCS | Mod: CPTII,S$GLB,, | Performed by: UROLOGY

## 2021-10-19 PROCEDURE — 1101F PR PT FALLS ASSESS DOC 0-1 FALLS W/OUT INJ PAST YR: ICD-10-PCS | Mod: CPTII,S$GLB,, | Performed by: UROLOGY

## 2021-10-19 PROCEDURE — 1126F AMNT PAIN NOTED NONE PRSNT: CPT | Mod: CPTII,S$GLB,, | Performed by: UROLOGY

## 2021-10-19 PROCEDURE — 99205 OFFICE O/P NEW HI 60 MIN: CPT | Mod: S$GLB,,, | Performed by: UROLOGY

## 2021-10-19 PROCEDURE — 3077F PR MOST RECENT SYSTOLIC BLOOD PRESSURE >= 140 MM HG: ICD-10-PCS | Mod: CPTII,S$GLB,, | Performed by: UROLOGY

## 2021-10-19 PROCEDURE — 3078F DIAST BP <80 MM HG: CPT | Mod: CPTII,S$GLB,, | Performed by: UROLOGY

## 2021-10-19 PROCEDURE — 3077F SYST BP >= 140 MM HG: CPT | Mod: CPTII,S$GLB,, | Performed by: UROLOGY

## 2021-10-19 PROCEDURE — 1159F MED LIST DOCD IN RCRD: CPT | Mod: CPTII,S$GLB,, | Performed by: UROLOGY

## 2021-10-19 PROCEDURE — 3078F PR MOST RECENT DIASTOLIC BLOOD PRESSURE < 80 MM HG: ICD-10-PCS | Mod: CPTII,S$GLB,, | Performed by: UROLOGY

## 2021-10-19 PROCEDURE — 99999 PR PBB SHADOW E&M-EST. PATIENT-LVL III: ICD-10-PCS | Mod: PBBFAC,,, | Performed by: UROLOGY

## 2021-10-19 PROCEDURE — 99205 PR OFFICE/OUTPT VISIT, NEW, LEVL V, 60-74 MIN: ICD-10-PCS | Mod: S$GLB,,, | Performed by: UROLOGY

## 2021-10-19 PROCEDURE — 1160F PR REVIEW ALL MEDS BY PRESCRIBER/CLIN PHARMACIST DOCUMENTED: ICD-10-PCS | Mod: CPTII,S$GLB,, | Performed by: UROLOGY

## 2021-10-19 RX ORDER — PENTOXIFYLLINE 400 MG/1
400 TABLET, EXTENDED RELEASE ORAL 2 TIMES DAILY
Qty: 60 TABLET | Refills: 11 | Status: SHIPPED | OUTPATIENT
Start: 2021-10-19 | End: 2021-12-13

## 2021-10-21 ENCOUNTER — TELEPHONE (OUTPATIENT)
Dept: UROLOGY | Facility: CLINIC | Age: 81
End: 2021-10-21

## 2021-10-22 ENCOUNTER — TELEPHONE (OUTPATIENT)
Dept: UROLOGY | Facility: CLINIC | Age: 81
End: 2021-10-22

## 2021-10-25 ENCOUNTER — TELEPHONE (OUTPATIENT)
Dept: UROLOGY | Facility: CLINIC | Age: 81
End: 2021-10-25
Payer: MEDICARE

## 2021-11-08 ENCOUNTER — TELEPHONE (OUTPATIENT)
Dept: UROLOGY | Facility: CLINIC | Age: 81
End: 2021-11-08

## 2021-11-08 ENCOUNTER — OFFICE VISIT (OUTPATIENT)
Dept: UROLOGY | Facility: CLINIC | Age: 81
End: 2021-11-08
Payer: MEDICARE

## 2021-11-08 VITALS
BODY MASS INDEX: 21.5 KG/M2 | SYSTOLIC BLOOD PRESSURE: 145 MMHG | HEART RATE: 78 BPM | DIASTOLIC BLOOD PRESSURE: 77 MMHG | WEIGHT: 167.56 LBS | HEIGHT: 74 IN

## 2021-11-08 DIAGNOSIS — N52.01 ERECTILE DYSFUNCTION DUE TO ARTERIAL INSUFFICIENCY: Primary | ICD-10-CM

## 2021-11-08 DIAGNOSIS — E11.49 TYPE 2 DIABETES MELLITUS WITH NEUROLOGICAL MANIFESTATIONS: ICD-10-CM

## 2021-11-08 DIAGNOSIS — E78.5 HYPERLIPIDEMIA LDL GOAL <100: ICD-10-CM

## 2021-11-08 DIAGNOSIS — N40.1 BPH WITH URINARY OBSTRUCTION: ICD-10-CM

## 2021-11-08 DIAGNOSIS — N48.6 PEYRONIE'S DISEASE: Primary | ICD-10-CM

## 2021-11-08 DIAGNOSIS — N52.01 ERECTILE DYSFUNCTION DUE TO ARTERIAL INSUFFICIENCY: ICD-10-CM

## 2021-11-08 DIAGNOSIS — I10 PRIMARY HYPERTENSION: ICD-10-CM

## 2021-11-08 DIAGNOSIS — N13.8 BPH WITH URINARY OBSTRUCTION: ICD-10-CM

## 2021-11-08 PROCEDURE — 1160F PR REVIEW ALL MEDS BY PRESCRIBER/CLIN PHARMACIST DOCUMENTED: ICD-10-PCS | Mod: CPTII,S$GLB,, | Performed by: UROLOGY

## 2021-11-08 PROCEDURE — 3078F PR MOST RECENT DIASTOLIC BLOOD PRESSURE < 80 MM HG: ICD-10-PCS | Mod: CPTII,S$GLB,, | Performed by: UROLOGY

## 2021-11-08 PROCEDURE — 1159F PR MEDICATION LIST DOCUMENTED IN MEDICAL RECORD: ICD-10-PCS | Mod: CPTII,S$GLB,, | Performed by: UROLOGY

## 2021-11-08 PROCEDURE — 1126F AMNT PAIN NOTED NONE PRSNT: CPT | Mod: CPTII,S$GLB,, | Performed by: UROLOGY

## 2021-11-08 PROCEDURE — 1160F RVW MEDS BY RX/DR IN RCRD: CPT | Mod: CPTII,S$GLB,, | Performed by: UROLOGY

## 2021-11-08 PROCEDURE — 3077F PR MOST RECENT SYSTOLIC BLOOD PRESSURE >= 140 MM HG: ICD-10-PCS | Mod: CPTII,S$GLB,, | Performed by: UROLOGY

## 2021-11-08 PROCEDURE — 99215 OFFICE O/P EST HI 40 MIN: CPT | Mod: S$GLB,,, | Performed by: UROLOGY

## 2021-11-08 PROCEDURE — 99215 PR OFFICE/OUTPT VISIT, EST, LEVL V, 40-54 MIN: ICD-10-PCS | Mod: S$GLB,,, | Performed by: UROLOGY

## 2021-11-08 PROCEDURE — 99999 PR PBB SHADOW E&M-EST. PATIENT-LVL IV: ICD-10-PCS | Mod: PBBFAC,,, | Performed by: UROLOGY

## 2021-11-08 PROCEDURE — 1101F PT FALLS ASSESS-DOCD LE1/YR: CPT | Mod: CPTII,S$GLB,, | Performed by: UROLOGY

## 2021-11-08 PROCEDURE — 3078F DIAST BP <80 MM HG: CPT | Mod: CPTII,S$GLB,, | Performed by: UROLOGY

## 2021-11-08 PROCEDURE — 3288F FALL RISK ASSESSMENT DOCD: CPT | Mod: CPTII,S$GLB,, | Performed by: UROLOGY

## 2021-11-08 PROCEDURE — 1126F PR PAIN SEVERITY QUANTIFIED, NO PAIN PRESENT: ICD-10-PCS | Mod: CPTII,S$GLB,, | Performed by: UROLOGY

## 2021-11-08 PROCEDURE — 99999 PR PBB SHADOW E&M-EST. PATIENT-LVL IV: CPT | Mod: PBBFAC,,, | Performed by: UROLOGY

## 2021-11-08 PROCEDURE — 1101F PR PT FALLS ASSESS DOC 0-1 FALLS W/OUT INJ PAST YR: ICD-10-PCS | Mod: CPTII,S$GLB,, | Performed by: UROLOGY

## 2021-11-08 PROCEDURE — 1159F MED LIST DOCD IN RCRD: CPT | Mod: CPTII,S$GLB,, | Performed by: UROLOGY

## 2021-11-08 PROCEDURE — 3077F SYST BP >= 140 MM HG: CPT | Mod: CPTII,S$GLB,, | Performed by: UROLOGY

## 2021-11-08 PROCEDURE — 3288F PR FALLS RISK ASSESSMENT DOCUMENTED: ICD-10-PCS | Mod: CPTII,S$GLB,, | Performed by: UROLOGY

## 2021-11-17 ENCOUNTER — TELEPHONE (OUTPATIENT)
Dept: UROLOGY | Facility: CLINIC | Age: 81
End: 2021-11-17
Payer: MEDICARE

## 2021-11-29 ENCOUNTER — OFFICE VISIT (OUTPATIENT)
Dept: UROLOGY | Facility: CLINIC | Age: 81
End: 2021-11-29
Payer: MEDICARE

## 2021-11-29 ENCOUNTER — LAB VISIT (OUTPATIENT)
Dept: LAB | Facility: HOSPITAL | Age: 81
End: 2021-11-29
Payer: MEDICARE

## 2021-11-29 DIAGNOSIS — N48.6 PEYRONIE DISEASE: ICD-10-CM

## 2021-11-29 DIAGNOSIS — E11.49 TYPE 2 DIABETES MELLITUS WITH NEUROLOGICAL MANIFESTATIONS: ICD-10-CM

## 2021-11-29 DIAGNOSIS — N48.6 PEYRONIE'S DISEASE: Primary | ICD-10-CM

## 2021-11-29 LAB
ESTIMATED AVG GLUCOSE: 108 MG/DL (ref 68–131)
HBA1C MFR BLD: 5.4 % (ref 4–5.6)

## 2021-11-29 PROCEDURE — 36415 COLL VENOUS BLD VENIPUNCTURE: CPT | Performed by: STUDENT IN AN ORGANIZED HEALTH CARE EDUCATION/TRAINING PROGRAM

## 2021-11-29 PROCEDURE — 99499 UNLISTED E&M SERVICE: CPT | Mod: S$GLB,,, | Performed by: UROLOGY

## 2021-11-29 PROCEDURE — 83036 HEMOGLOBIN GLYCOSYLATED A1C: CPT | Performed by: STUDENT IN AN ORGANIZED HEALTH CARE EDUCATION/TRAINING PROGRAM

## 2021-11-29 PROCEDURE — 99499 NO LOS: ICD-10-PCS | Mod: S$GLB,,, | Performed by: UROLOGY

## 2021-12-13 ENCOUNTER — TELEPHONE (OUTPATIENT)
Dept: UROLOGY | Facility: CLINIC | Age: 81
End: 2021-12-13
Payer: MEDICARE

## 2021-12-14 ENCOUNTER — ANESTHESIA (OUTPATIENT)
Dept: SURGERY | Facility: HOSPITAL | Age: 81
End: 2021-12-14
Payer: MEDICARE

## 2021-12-14 ENCOUNTER — HOSPITAL ENCOUNTER (OUTPATIENT)
Facility: HOSPITAL | Age: 81
Discharge: HOME | End: 2021-12-15
Attending: UROLOGY | Admitting: UROLOGY
Payer: MEDICARE

## 2021-12-14 ENCOUNTER — ANESTHESIA EVENT (OUTPATIENT)
Dept: SURGERY | Facility: HOSPITAL | Age: 81
End: 2021-12-14
Payer: MEDICARE

## 2021-12-14 DIAGNOSIS — N48.6 PEYRONIE DISEASE: ICD-10-CM

## 2021-12-14 DIAGNOSIS — N52.01 ERECTILE DYSFUNCTION DUE TO ARTERIAL INSUFFICIENCY: Primary | ICD-10-CM

## 2021-12-14 LAB
POCT GLUCOSE: 116 MG/DL (ref 70–110)
POCT GLUCOSE: 132 MG/DL (ref 70–110)
POCT GLUCOSE: 141 MG/DL (ref 70–110)
POCT GLUCOSE: 207 MG/DL (ref 70–110)

## 2021-12-14 PROCEDURE — 87102 FUNGUS ISOLATION CULTURE: CPT | Performed by: UROLOGY

## 2021-12-14 PROCEDURE — 54405 PR INSERT,INFLATABLE PENILE PROSTHESIS: ICD-10-PCS | Mod: ,,, | Performed by: UROLOGY

## 2021-12-14 PROCEDURE — 63600175 PHARM REV CODE 636 W HCPCS: Performed by: ANESTHESIOLOGY

## 2021-12-14 PROCEDURE — 94799 UNLISTED PULMONARY SVC/PX: CPT

## 2021-12-14 PROCEDURE — 87075 CULTR BACTERIA EXCEPT BLOOD: CPT | Performed by: UROLOGY

## 2021-12-14 PROCEDURE — D9220A PRA ANESTHESIA: Mod: CRNA,,, | Performed by: NURSE ANESTHETIST, CERTIFIED REGISTERED

## 2021-12-14 PROCEDURE — 63600175 PHARM REV CODE 636 W HCPCS: Performed by: STUDENT IN AN ORGANIZED HEALTH CARE EDUCATION/TRAINING PROGRAM

## 2021-12-14 PROCEDURE — C1813 PROSTHESIS, PENILE, INFLATAB: HCPCS | Performed by: UROLOGY

## 2021-12-14 PROCEDURE — 37000009 HC ANESTHESIA EA ADD 15 MINS: Performed by: UROLOGY

## 2021-12-14 PROCEDURE — 71000033 HC RECOVERY, INTIAL HOUR: Performed by: UROLOGY

## 2021-12-14 PROCEDURE — 36000707: Performed by: UROLOGY

## 2021-12-14 PROCEDURE — 63600175 PHARM REV CODE 636 W HCPCS: Performed by: UROLOGY

## 2021-12-14 PROCEDURE — D9220A PRA ANESTHESIA: ICD-10-PCS | Mod: ANES,,, | Performed by: ANESTHESIOLOGY

## 2021-12-14 PROCEDURE — 63600175 PHARM REV CODE 636 W HCPCS: Performed by: NURSE ANESTHETIST, CERTIFIED REGISTERED

## 2021-12-14 PROCEDURE — 87070 CULTURE OTHR SPECIMN AEROBIC: CPT | Performed by: UROLOGY

## 2021-12-14 PROCEDURE — 37000008 HC ANESTHESIA 1ST 15 MINUTES: Performed by: UROLOGY

## 2021-12-14 PROCEDURE — 25000003 PHARM REV CODE 250: Performed by: NURSE ANESTHETIST, CERTIFIED REGISTERED

## 2021-12-14 PROCEDURE — 25000003 PHARM REV CODE 250: Performed by: STUDENT IN AN ORGANIZED HEALTH CARE EDUCATION/TRAINING PROGRAM

## 2021-12-14 PROCEDURE — 87205 SMEAR GRAM STAIN: CPT | Performed by: UROLOGY

## 2021-12-14 PROCEDURE — 94761 N-INVAS EAR/PLS OXIMETRY MLT: CPT

## 2021-12-14 PROCEDURE — 71000016 HC POSTOP RECOV ADDL HR: Performed by: UROLOGY

## 2021-12-14 PROCEDURE — 36000706: Performed by: UROLOGY

## 2021-12-14 PROCEDURE — D9220A PRA ANESTHESIA: ICD-10-PCS | Mod: CRNA,,, | Performed by: NURSE ANESTHETIST, CERTIFIED REGISTERED

## 2021-12-14 PROCEDURE — D9220A PRA ANESTHESIA: Mod: ANES,,, | Performed by: ANESTHESIOLOGY

## 2021-12-14 PROCEDURE — 54405 INSERT MULTI-COMP PENIS PROS: CPT | Mod: ,,, | Performed by: UROLOGY

## 2021-12-14 PROCEDURE — 27201423 OPTIME MED/SURG SUP & DEVICES STERILE SUPPLY: Performed by: UROLOGY

## 2021-12-14 PROCEDURE — 82962 GLUCOSE BLOOD TEST: CPT | Performed by: UROLOGY

## 2021-12-14 PROCEDURE — 71000015 HC POSTOP RECOV 1ST HR: Performed by: UROLOGY

## 2021-12-14 PROCEDURE — 82962 GLUCOSE BLOOD TEST: CPT | Mod: 91 | Performed by: UROLOGY

## 2021-12-14 DEVICE — PROSTHESIS PENILE 700CX 15CM: Type: IMPLANTABLE DEVICE | Site: SCROTUM | Status: FUNCTIONAL

## 2021-12-14 DEVICE — RESERVOIR FLAT I2 100ML: Type: IMPLANTABLE DEVICE | Site: SCROTUM | Status: FUNCTIONAL

## 2021-12-14 DEVICE — TIP EXTENDER PENILE PROS 3CM: Type: IMPLANTABLE DEVICE | Site: SCROTUM | Status: FUNCTIONAL

## 2021-12-14 DEVICE — KIT ACCESSORY PENILE: Type: IMPLANTABLE DEVICE | Site: SCROTUM | Status: FUNCTIONAL

## 2021-12-14 RX ORDER — PROPOFOL 10 MG/ML
VIAL (ML) INTRAVENOUS
Status: DISCONTINUED | OUTPATIENT
Start: 2021-12-14 | End: 2021-12-14

## 2021-12-14 RX ORDER — MIDAZOLAM HYDROCHLORIDE 1 MG/ML
INJECTION, SOLUTION INTRAMUSCULAR; INTRAVENOUS
Status: DISCONTINUED | OUTPATIENT
Start: 2021-12-14 | End: 2021-12-14

## 2021-12-14 RX ORDER — VANCOMYCIN HYDROCHLORIDE 1 G/20ML
INJECTION, POWDER, LYOPHILIZED, FOR SOLUTION INTRAVENOUS
Status: DISCONTINUED | OUTPATIENT
Start: 2021-12-14 | End: 2021-12-14 | Stop reason: HOSPADM

## 2021-12-14 RX ORDER — DEXAMETHASONE SODIUM PHOSPHATE 4 MG/ML
INJECTION, SOLUTION INTRA-ARTICULAR; INTRALESIONAL; INTRAMUSCULAR; INTRAVENOUS; SOFT TISSUE
Status: DISCONTINUED | OUTPATIENT
Start: 2021-12-14 | End: 2021-12-14

## 2021-12-14 RX ORDER — MAG HYDROX/ALUMINUM HYD/SIMETH 200-200-20
30 SUSPENSION, ORAL (FINAL DOSE FORM) ORAL
Status: DISCONTINUED | OUTPATIENT
Start: 2021-12-14 | End: 2021-12-15 | Stop reason: HOSPADM

## 2021-12-14 RX ORDER — HYDROMORPHONE HYDROCHLORIDE 1 MG/ML
0.2 INJECTION, SOLUTION INTRAMUSCULAR; INTRAVENOUS; SUBCUTANEOUS EVERY 5 MIN PRN
Status: DISCONTINUED | OUTPATIENT
Start: 2021-12-14 | End: 2021-12-15 | Stop reason: HOSPADM

## 2021-12-14 RX ORDER — AMLODIPINE BESYLATE 10 MG/1
10 TABLET ORAL DAILY
Status: DISCONTINUED | OUTPATIENT
Start: 2021-12-15 | End: 2021-12-15 | Stop reason: HOSPADM

## 2021-12-14 RX ORDER — PHENYLEPHRINE HYDROCHLORIDE 10 MG/ML
INJECTION INTRAVENOUS
Status: DISCONTINUED | OUTPATIENT
Start: 2021-12-14 | End: 2021-12-14

## 2021-12-14 RX ORDER — GLUCAGON 1 MG
1 KIT INJECTION
Status: DISCONTINUED | OUTPATIENT
Start: 2021-12-14 | End: 2021-12-15 | Stop reason: HOSPADM

## 2021-12-14 RX ORDER — SODIUM CHLORIDE 0.9 % (FLUSH) 0.9 %
3 SYRINGE (ML) INJECTION EVERY 30 MIN PRN
Status: DISCONTINUED | OUTPATIENT
Start: 2021-12-14 | End: 2021-12-15 | Stop reason: HOSPADM

## 2021-12-14 RX ORDER — SUCRALFATE 1 G/10ML
1 SUSPENSION ORAL EVERY 6 HOURS
Status: DISCONTINUED | OUTPATIENT
Start: 2021-12-14 | End: 2021-12-15 | Stop reason: HOSPADM

## 2021-12-14 RX ORDER — LIDOCAINE HYDROCHLORIDE 20 MG/ML
INJECTION INTRAVENOUS
Status: DISCONTINUED | OUTPATIENT
Start: 2021-12-14 | End: 2021-12-14

## 2021-12-14 RX ORDER — LOSARTAN POTASSIUM 50 MG/1
100 TABLET ORAL DAILY
Status: DISCONTINUED | OUTPATIENT
Start: 2021-12-14 | End: 2021-12-15 | Stop reason: HOSPADM

## 2021-12-14 RX ORDER — GENTAMICIN SULFATE 40 MG/ML
INJECTION, SOLUTION INTRAMUSCULAR; INTRAVENOUS
Status: DISCONTINUED | OUTPATIENT
Start: 2021-12-14 | End: 2021-12-14 | Stop reason: HOSPADM

## 2021-12-14 RX ORDER — ONDANSETRON 8 MG/1
8 TABLET, ORALLY DISINTEGRATING ORAL EVERY 8 HOURS PRN
Status: DISCONTINUED | OUTPATIENT
Start: 2021-12-14 | End: 2021-12-15 | Stop reason: HOSPADM

## 2021-12-14 RX ORDER — IBUPROFEN 200 MG
24 TABLET ORAL
Status: DISCONTINUED | OUTPATIENT
Start: 2021-12-14 | End: 2021-12-15 | Stop reason: HOSPADM

## 2021-12-14 RX ORDER — ONDANSETRON 2 MG/ML
4 INJECTION INTRAMUSCULAR; INTRAVENOUS DAILY PRN
Status: DISCONTINUED | OUTPATIENT
Start: 2021-12-14 | End: 2021-12-15 | Stop reason: HOSPADM

## 2021-12-14 RX ORDER — FENTANYL CITRATE 50 UG/ML
INJECTION, SOLUTION INTRAMUSCULAR; INTRAVENOUS
Status: DISCONTINUED | OUTPATIENT
Start: 2021-12-14 | End: 2021-12-14

## 2021-12-14 RX ORDER — ONDANSETRON 2 MG/ML
INJECTION INTRAMUSCULAR; INTRAVENOUS
Status: DISCONTINUED | OUTPATIENT
Start: 2021-12-14 | End: 2021-12-14

## 2021-12-14 RX ORDER — SODIUM CHLORIDE, SODIUM LACTATE, POTASSIUM CHLORIDE, CALCIUM CHLORIDE 600; 310; 30; 20 MG/100ML; MG/100ML; MG/100ML; MG/100ML
INJECTION, SOLUTION INTRAVENOUS CONTINUOUS
Status: DISCONTINUED | OUTPATIENT
Start: 2021-12-14 | End: 2021-12-15 | Stop reason: HOSPADM

## 2021-12-14 RX ORDER — ROCURONIUM BROMIDE 10 MG/ML
INJECTION, SOLUTION INTRAVENOUS
Status: DISCONTINUED | OUTPATIENT
Start: 2021-12-14 | End: 2021-12-14

## 2021-12-14 RX ORDER — HYDROCODONE BITARTRATE AND ACETAMINOPHEN 5; 325 MG/1; MG/1
1 TABLET ORAL EVERY 4 HOURS PRN
Status: DISCONTINUED | OUTPATIENT
Start: 2021-12-14 | End: 2021-12-15 | Stop reason: HOSPADM

## 2021-12-14 RX ORDER — EPHEDRINE SULFATE 50 MG/ML
INJECTION, SOLUTION INTRAVENOUS
Status: DISCONTINUED | OUTPATIENT
Start: 2021-12-14 | End: 2021-12-14

## 2021-12-14 RX ORDER — INSULIN ASPART 100 [IU]/ML
1-10 INJECTION, SOLUTION INTRAVENOUS; SUBCUTANEOUS
Status: DISCONTINUED | OUTPATIENT
Start: 2021-12-14 | End: 2021-12-15 | Stop reason: HOSPADM

## 2021-12-14 RX ORDER — ACETAMINOPHEN 10 MG/ML
INJECTION, SOLUTION INTRAVENOUS
Status: DISCONTINUED | OUTPATIENT
Start: 2021-12-14 | End: 2021-12-14

## 2021-12-14 RX ORDER — ATORVASTATIN CALCIUM 20 MG/1
20 TABLET, FILM COATED ORAL DAILY
Status: DISCONTINUED | OUTPATIENT
Start: 2021-12-15 | End: 2021-12-15 | Stop reason: HOSPADM

## 2021-12-14 RX ORDER — IBUPROFEN 200 MG
16 TABLET ORAL
Status: DISCONTINUED | OUTPATIENT
Start: 2021-12-14 | End: 2021-12-15 | Stop reason: HOSPADM

## 2021-12-14 RX ADMIN — HYDROMORPHONE HYDROCHLORIDE 0.2 MG: 1 INJECTION, SOLUTION INTRAMUSCULAR; INTRAVENOUS; SUBCUTANEOUS at 02:12

## 2021-12-14 RX ADMIN — INSULIN ASPART 2 UNITS: 100 INJECTION, SOLUTION INTRAVENOUS; SUBCUTANEOUS at 11:12

## 2021-12-14 RX ADMIN — HYDROMORPHONE HYDROCHLORIDE 0.2 MG: 1 INJECTION, SOLUTION INTRAMUSCULAR; INTRAVENOUS; SUBCUTANEOUS at 12:12

## 2021-12-14 RX ADMIN — ALUMINUM HYDROXIDE, MAGNESIUM HYDROXIDE, AND SIMETHICONE 30 ML: 200; 200; 20 SUSPENSION ORAL at 05:12

## 2021-12-14 RX ADMIN — LIDOCAINE HYDROCHLORIDE 100 MG: 20 INJECTION, SOLUTION INTRAVENOUS at 10:12

## 2021-12-14 RX ADMIN — VANCOMYCIN HYDROCHLORIDE 1500 MG: 1.5 INJECTION, POWDER, LYOPHILIZED, FOR SOLUTION INTRAVENOUS at 08:12

## 2021-12-14 RX ADMIN — HYDROCODONE BITARTRATE AND ACETAMINOPHEN 1 TABLET: 5; 325 TABLET ORAL at 05:12

## 2021-12-14 RX ADMIN — ROCURONIUM BROMIDE 10 MG: 10 INJECTION, SOLUTION INTRAVENOUS at 11:12

## 2021-12-14 RX ADMIN — GLYCOPYRROLATE 0.2 MG: 0.2 INJECTION, SOLUTION INTRAMUSCULAR; INTRAVITREAL at 11:12

## 2021-12-14 RX ADMIN — ROCURONIUM BROMIDE 10 MG: 10 INJECTION, SOLUTION INTRAVENOUS at 10:12

## 2021-12-14 RX ADMIN — ACETAMINOPHEN 1000 MG: 10 INJECTION, SOLUTION INTRAVENOUS at 10:12

## 2021-12-14 RX ADMIN — SUGAMMADEX 200 MG: 100 INJECTION, SOLUTION INTRAVENOUS at 12:12

## 2021-12-14 RX ADMIN — MIDAZOLAM HYDROCHLORIDE 1 MG: 1 INJECTION, SOLUTION INTRAMUSCULAR; INTRAVENOUS at 10:12

## 2021-12-14 RX ADMIN — ALUMINUM HYDROXIDE, MAGNESIUM HYDROXIDE, AND SIMETHICONE 30 ML: 200; 200; 20 SUSPENSION ORAL at 09:12

## 2021-12-14 RX ADMIN — FENTANYL CITRATE 100 MCG: 50 INJECTION, SOLUTION INTRAMUSCULAR; INTRAVENOUS at 10:12

## 2021-12-14 RX ADMIN — HYDROCODONE BITARTRATE AND ACETAMINOPHEN 1 TABLET: 5; 325 TABLET ORAL at 09:12

## 2021-12-14 RX ADMIN — DEXAMETHASONE SODIUM PHOSPHATE 8 MG: 4 INJECTION, SOLUTION INTRAMUSCULAR; INTRAVENOUS at 10:12

## 2021-12-14 RX ADMIN — PROPOFOL 150 MG: 10 INJECTION, EMULSION INTRAVENOUS at 10:12

## 2021-12-14 RX ADMIN — ROCURONIUM BROMIDE 50 MG: 10 INJECTION, SOLUTION INTRAVENOUS at 10:12

## 2021-12-14 RX ADMIN — EPHEDRINE SULFATE 5 MG: 50 INJECTION INTRAVENOUS at 10:12

## 2021-12-14 RX ADMIN — LOSARTAN POTASSIUM 100 MG: 25 TABLET, FILM COATED ORAL at 01:12

## 2021-12-14 RX ADMIN — HYDROMORPHONE HYDROCHLORIDE 0.2 MG: 1 INJECTION, SOLUTION INTRAMUSCULAR; INTRAVENOUS; SUBCUTANEOUS at 01:12

## 2021-12-14 RX ADMIN — ONDANSETRON 4 MG: 2 INJECTION INTRAMUSCULAR; INTRAVENOUS at 12:12

## 2021-12-14 RX ADMIN — GENTAMICIN SULFATE 320 MG: 40 INJECTION, SOLUTION INTRAMUSCULAR; INTRAVENOUS at 08:12

## 2021-12-14 RX ADMIN — HYDROCODONE BITARTRATE AND ACETAMINOPHEN 1 TABLET: 5; 325 TABLET ORAL at 01:12

## 2021-12-14 RX ADMIN — SODIUM CHLORIDE, SODIUM LACTATE, POTASSIUM CHLORIDE, AND CALCIUM CHLORIDE: .6; .31; .03; .02 INJECTION, SOLUTION INTRAVENOUS at 01:12

## 2021-12-14 RX ADMIN — EPHEDRINE SULFATE 10 MG: 50 INJECTION INTRAVENOUS at 10:12

## 2021-12-14 RX ADMIN — SODIUM CHLORIDE: 0.9 INJECTION, SOLUTION INTRAVENOUS at 09:12

## 2021-12-14 RX ADMIN — PHENYLEPHRINE HYDROCHLORIDE 100 MCG: 10 INJECTION INTRAVENOUS at 10:12

## 2021-12-14 RX ADMIN — SUCRALFATE 1 G: 1 SUSPENSION ORAL at 05:12

## 2021-12-14 RX ADMIN — SUCRALFATE 1 G: 1 SUSPENSION ORAL at 11:12

## 2021-12-15 VITALS
RESPIRATION RATE: 20 BRPM | DIASTOLIC BLOOD PRESSURE: 60 MMHG | TEMPERATURE: 98 F | HEART RATE: 61 BPM | OXYGEN SATURATION: 96 % | BODY MASS INDEX: 21.17 KG/M2 | SYSTOLIC BLOOD PRESSURE: 132 MMHG | WEIGHT: 165 LBS | HEIGHT: 74 IN

## 2021-12-15 LAB
GRAM STN SPEC: NORMAL
POCT GLUCOSE: 170 MG/DL (ref 70–110)

## 2021-12-15 PROCEDURE — 25000003 PHARM REV CODE 250: Performed by: STUDENT IN AN ORGANIZED HEALTH CARE EDUCATION/TRAINING PROGRAM

## 2021-12-15 PROCEDURE — 63600175 PHARM REV CODE 636 W HCPCS: Performed by: STUDENT IN AN ORGANIZED HEALTH CARE EDUCATION/TRAINING PROGRAM

## 2021-12-15 RX ORDER — OXYCODONE AND ACETAMINOPHEN 5; 325 MG/1; MG/1
1 TABLET ORAL EVERY 4 HOURS PRN
Qty: 10 TABLET | Refills: 0 | Status: SHIPPED | OUTPATIENT
Start: 2021-12-15 | End: 2022-03-09 | Stop reason: CLARIF

## 2021-12-15 RX ORDER — POLYETHYLENE GLYCOL 3350 17 G/17G
17 POWDER, FOR SOLUTION ORAL DAILY
Qty: 238 G | Refills: 0 | Status: SHIPPED | OUTPATIENT
Start: 2021-12-15

## 2021-12-15 RX ORDER — DICLOXACILLIN SODIUM 500 MG/1
500 CAPSULE ORAL 4 TIMES DAILY
Qty: 8 CAPSULE | Refills: 0 | Status: SHIPPED | OUTPATIENT
Start: 2021-12-15 | End: 2021-12-17

## 2021-12-15 RX ADMIN — GENTAMICIN SULFATE 320 MG: 40 INJECTION, SOLUTION INTRAMUSCULAR; INTRAVENOUS at 04:12

## 2021-12-15 RX ADMIN — SUCRALFATE 1 G: 1 SUSPENSION ORAL at 06:12

## 2021-12-15 RX ADMIN — ALUMINUM HYDROXIDE, MAGNESIUM HYDROXIDE, AND SIMETHICONE 30 ML: 200; 200; 20 SUSPENSION ORAL at 06:12

## 2021-12-15 RX ADMIN — ATORVASTATIN CALCIUM 20 MG: 20 TABLET, FILM COATED ORAL at 08:12

## 2021-12-15 RX ADMIN — AMLODIPINE BESYLATE 10 MG: 10 TABLET ORAL at 08:12

## 2021-12-15 RX ADMIN — LOSARTAN POTASSIUM 100 MG: 25 TABLET, FILM COATED ORAL at 08:12

## 2021-12-15 RX ADMIN — VANCOMYCIN HYDROCHLORIDE 1500 MG: 1.5 INJECTION, POWDER, LYOPHILIZED, FOR SOLUTION INTRAVENOUS at 05:12

## 2021-12-15 RX ADMIN — HYDROCODONE BITARTRATE AND ACETAMINOPHEN 1 TABLET: 5; 325 TABLET ORAL at 07:12

## 2021-12-18 LAB — BACTERIA SPEC AEROBE CULT: NO GROWTH

## 2021-12-22 LAB — BACTERIA SPEC ANAEROBE CULT: NORMAL

## 2022-01-03 ENCOUNTER — OFFICE VISIT (OUTPATIENT)
Dept: UROLOGY | Facility: CLINIC | Age: 82
End: 2022-01-03
Payer: MEDICARE

## 2022-01-03 VITALS
HEIGHT: 74 IN | SYSTOLIC BLOOD PRESSURE: 144 MMHG | HEART RATE: 94 BPM | BODY MASS INDEX: 21.1 KG/M2 | DIASTOLIC BLOOD PRESSURE: 77 MMHG | WEIGHT: 164.44 LBS

## 2022-01-03 DIAGNOSIS — N52.01 ERECTILE DYSFUNCTION DUE TO ARTERIAL INSUFFICIENCY: Primary | ICD-10-CM

## 2022-01-03 PROCEDURE — 1160F PR REVIEW ALL MEDS BY PRESCRIBER/CLIN PHARMACIST DOCUMENTED: ICD-10-PCS | Mod: CPTII,S$GLB,, | Performed by: UROLOGY

## 2022-01-03 PROCEDURE — 1160F RVW MEDS BY RX/DR IN RCRD: CPT | Mod: CPTII,S$GLB,, | Performed by: UROLOGY

## 2022-01-03 PROCEDURE — 99024 PR POST-OP FOLLOW-UP VISIT: ICD-10-PCS | Mod: S$GLB,,, | Performed by: UROLOGY

## 2022-01-03 PROCEDURE — 99999 PR PBB SHADOW E&M-EST. PATIENT-LVL IV: ICD-10-PCS | Mod: PBBFAC,,, | Performed by: UROLOGY

## 2022-01-03 PROCEDURE — 3077F SYST BP >= 140 MM HG: CPT | Mod: CPTII,S$GLB,, | Performed by: UROLOGY

## 2022-01-03 PROCEDURE — 1159F MED LIST DOCD IN RCRD: CPT | Mod: CPTII,S$GLB,, | Performed by: UROLOGY

## 2022-01-03 PROCEDURE — 1159F PR MEDICATION LIST DOCUMENTED IN MEDICAL RECORD: ICD-10-PCS | Mod: CPTII,S$GLB,, | Performed by: UROLOGY

## 2022-01-03 PROCEDURE — 1125F PR PAIN SEVERITY QUANTIFIED, PAIN PRESENT: ICD-10-PCS | Mod: CPTII,S$GLB,, | Performed by: UROLOGY

## 2022-01-03 PROCEDURE — 1101F PR PT FALLS ASSESS DOC 0-1 FALLS W/OUT INJ PAST YR: ICD-10-PCS | Mod: CPTII,S$GLB,, | Performed by: UROLOGY

## 2022-01-03 PROCEDURE — 3078F PR MOST RECENT DIASTOLIC BLOOD PRESSURE < 80 MM HG: ICD-10-PCS | Mod: CPTII,S$GLB,, | Performed by: UROLOGY

## 2022-01-03 PROCEDURE — 3078F DIAST BP <80 MM HG: CPT | Mod: CPTII,S$GLB,, | Performed by: UROLOGY

## 2022-01-03 PROCEDURE — 3288F PR FALLS RISK ASSESSMENT DOCUMENTED: ICD-10-PCS | Mod: CPTII,S$GLB,, | Performed by: UROLOGY

## 2022-01-03 PROCEDURE — 1125F AMNT PAIN NOTED PAIN PRSNT: CPT | Mod: CPTII,S$GLB,, | Performed by: UROLOGY

## 2022-01-03 PROCEDURE — 1101F PT FALLS ASSESS-DOCD LE1/YR: CPT | Mod: CPTII,S$GLB,, | Performed by: UROLOGY

## 2022-01-03 PROCEDURE — 3288F FALL RISK ASSESSMENT DOCD: CPT | Mod: CPTII,S$GLB,, | Performed by: UROLOGY

## 2022-01-03 PROCEDURE — 99999 PR PBB SHADOW E&M-EST. PATIENT-LVL IV: CPT | Mod: PBBFAC,,, | Performed by: UROLOGY

## 2022-01-03 PROCEDURE — 3077F PR MOST RECENT SYSTOLIC BLOOD PRESSURE >= 140 MM HG: ICD-10-PCS | Mod: CPTII,S$GLB,, | Performed by: UROLOGY

## 2022-01-03 PROCEDURE — 99024 POSTOP FOLLOW-UP VISIT: CPT | Mod: S$GLB,,, | Performed by: UROLOGY

## 2022-01-03 NOTE — PROGRESS NOTES
CHIEF COMPLAINT:    Mr. Waite is a 81 y.o. male presenting with peyronie's.    PRESENTING ILLNESS:    Carolann Waite is a 81 y.o. male who c/o peyronie's disease and severe ED.  He's s/p placement of a 3 piece AMS IPP with modeling on 12/14/21.  He c/o penile and scrotal pain.    REVIEW OF SYSTEMS:    Carolann Waite denies headache, blurred vision, fever, nausea, vomiting, chills, abdominal pain, chest pain, sore throat, bleeding per rectum, cough, SOB, recent loss of consciousness, recent mental status changes, seizures, dizziness, or upper or lower extremity weakness.    LUIS MANUEL  1. 1  2. 0  3. 0  4. 0  5. 0      PATIENT HISTORY:    Past Medical History:   Diagnosis Date    Arthritis     Cataract     OU    Colon polyps     Diabetes     Diabetes mellitus, type 2     Diverticulosis     Gout     History of colon polyps 10/8/2014    HTN (hypertension)     Hyperlipidemia LDL goal <100     Impotence     Melanoma     Left forearm, s/p excision    Squamous cell carcinoma     Head/nose       Past Surgical History:   Procedure Laterality Date    cancer of skin (nose)  2/2014    basal cell ca    CIRCUMCISION, PRIMARY      COLONOSCOPY  7/31/2009  Radhames    Normal colon and TI.    COLONOSCOPY  10/08/2014    Dr. Ricci, repeat in 6-7 years    COLONOSCOPY N/A 1/18/2019    Procedure: COLONOSCOPY;  Surgeon: Ron Ricci Jr., MD;  Location: [facility] ENDO;  Service: Endoscopy;  Laterality: N/A;    COLONOSCOPY W/ POLYPECTOMY  8/7/2006  Radhames    Three cecal polyps, largest 4mm x 12mm.  TUBULAR ADENOMAS.  One 2 mm in the hepatic flexure.  TUBULAR ADENOMAS.    EPIDURAL STEROID INJECTION INTO CERVICAL SPINE N/A 8/15/2018    Procedure: Injection-steroid-epidural-cervical;  Surgeon: Keny Ibanez MD;  Location: [facility] OR;  Service: Pain Management;  Laterality: N/A;  to left    FLEXIBLE SIGMOIDOSCOPY  ~2001  Paigeers    INSERTION OF INFLATABLE PENILE PROSTHESIS N/A 12/14/2021    Procedure: INSERTION, PENILE PROSTHESIS,  INFLATABLE;  Surgeon: Juventino Rodriguez MD;  Location: [facility] OR [facility] FLR;  Service: Urology;  Laterality: N/A;  1.5hr    RECONSTRUCTION OF PENIS N/A 12/14/2021    Procedure: RECONSTRUCTION, PENIS remodeling;  Surgeon: Juventino Rodriguez MD;  Location: [facility] OR 2ND FLR;  Service: Urology;  Laterality: N/A;  1.5hr    removal of lipoma      R arm    removal of melanoma  2009    L arm    TONSILLECTOMY      UPPER GASTROINTESTINAL ENDOSCOPY  in his 30's    normal findings per patient report       Family History   Problem Relation Age of Onset    Diabetes Mother     Heart disease Brother 56        mi    Cancer Brother         Prostate    Heart attack Brother     Cancer Father         lymphoma    Hypertension Father     Nephrolithiasis Father     Colon cancer Neg Hx     Colon polyps Neg Hx     Crohn's disease Neg Hx     Ulcerative colitis Neg Hx     Stomach cancer Neg Hx     Esophageal cancer Neg Hx        Social History     Socioeconomic History    Marital status:    Tobacco Use    Smoking status: Current Some Day Smoker     Types: Cigars    Smokeless tobacco: Never Used    Tobacco comment: smokes a cigar on occasion   Substance and Sexual Activity    Alcohol use: Yes     Comment: 1 drink per month    Drug use: No    Sexual activity: Yes     Partners: Female       Allergies:  No known drug allergies    Medications:    Current Outpatient Medications:     ACCU-CHEK MILENA PLUS METER Misc, USE AS DIRECTED, Disp: 1 each, Rfl: 1    ACCU-CHEK MILENA PLUS TEST STRP Strp, CHECK BLOOD SUGAR TWICE DAILY, Disp: 200 strip, Rfl: 11    ACCU-CHEK SOFTCLIX LANCETS Misc, , Disp: , Rfl:     acyclovir (ZOVIRAX) 400 MG tablet, , Disp: , Rfl: 1    amLODIPine (NORVASC) 10 MG tablet, TAKE 1 TABLET EVERY DAY, Disp: 90 tablet, Rfl: 0    atorvastatin (LIPITOR) 20 MG tablet, TAKE 1 TABLET EVERY DAY, Disp: 90 tablet, Rfl: 2    ergocalciferol, vitamin D2, (VITAMIN D ORAL), Take by mouth once daily., Disp: , Rfl:      etodolac (LODINE) 200 MG Cap, Take 1 capsule (200 mg total) by mouth every 8 (eight) hours as needed. To use for gout attack related pain instead of indocin., Disp: 90 capsule, Rfl: 1    fexofenadine (ALLEGRA) 180 MG tablet, Take 1 tablet by mouth daily as needed., Disp: , Rfl:     fluticasone (FLONASE) 50 mcg/actuation nasal spray, 2 sprays by Nasal route daily as needed. 1 - 2 Spray(s) Nasal daily., Disp: 16 g, Rfl: 2    Lactobacillus rhamnosus GG (CULTURELLE) 10 billion cell capsule, Take 1 capsule by mouth once daily., Disp: , Rfl:     losartan (COZAAR) 100 MG tablet, Take 100 mg by mouth once daily., Disp: , Rfl:     pantoprazole (PROTONIX) 40 MG tablet, TAKE 1 TABLET EVERY DAY, Disp: 90 tablet, Rfl: 0    polyethylene glycol (GLYCOLAX) 17 gram/dose powder, Mix 1 capful (17 g) with a liquid and take by mouth once daily., Disp: 238 g, Rfl: 0    RESTASIS 0.05 % ophthalmic emulsion, Place into both eyes 2 (two) times daily., Disp: , Rfl:     SITagliptin (JANUVIA) 100 MG Tab, Take 100 mg by mouth once daily., Disp: , Rfl:     triamcinolone acetonide 0.1% (KENALOG) 0.1 % cream, , Disp: , Rfl:     oxyCODONE-acetaminophen (PERCOCET) 5-325 mg per tablet, Take 1 tablet by mouth every 4 (four) hours as needed. (Patient not taking: Reported on 1/3/2022), Disp: 10 tablet, Rfl: 0    PHYSICAL EXAMINATION:    The patient generally appears in good health, is appropriately interactive, and is in no apparent distress.     Eyes: anicteric sclerae, moist conjunctivae; no lid-lag; PERRLA     HENT: Atraumatic; oropharynx clear with moist mucous membranes and no mucosal ulcerations;normal hard and soft palate.  No evidence of lymphadenopathy.    Neck: Trachea midline.  No thyromegaly.    Skin: No lesions.    Mental: Cooperative with normal affect.  Is oriented to time, place, and person.    Neuro: Grossly intact.    Chest: Normal inspiratory effort.   No accessory muscles.  No audible wheezes.  Respirations symmetric on  inspiration and expiration.    Heart: Regular rhythm.      Abdomen:  Soft, non-tender. No masses or organomegaly. Bladder is not palpable. No evidence of flank discomfort. No evidence of inguinal hernia.    Genitourinary: The penis is circumcised with a plaque c/w peyronie's on the shaft. The urethral meatus is normal. The testes, epididymides, and cord structures are normal in size and contour bilaterally. The scrotum is normal in size and contour.    LYDIA done by Dr. Gutierrez 10/2021.  Deferred.    Extremities: No clubbing, cyanosis, or edema      LABS:    UA dipped negative today  Lab Results   Component Value Date    PSA 2.6 08/23/2021    PSA 3.7 11/06/2017    PSA 1.6 05/17/2017    PSADIAG 2.1 08/22/2018       IMPRESSION:    Encounter Diagnoses   Name Primary?    Erectile dysfunction due to arterial insufficiency Yes   HTN, stable  Hyperlipidemia, stable  DM, stable      PLAN:    1. RTC 4 weeks with an CHASIDY for inflation/deflation teaching.  2. RTC 3 months.  3. Discussed that he still has some edema and bruising from the surgery.  His pain does not sound out of proportion to his physical exam.  He should wear tighter underwear.    Copy to: Matt

## 2022-01-17 LAB — FUNGUS SPEC CULT: NORMAL

## 2022-01-24 ENCOUNTER — OFFICE VISIT (OUTPATIENT)
Dept: UROLOGY | Facility: CLINIC | Age: 82
End: 2022-01-24
Payer: MEDICARE

## 2022-01-24 VITALS
DIASTOLIC BLOOD PRESSURE: 77 MMHG | HEIGHT: 74 IN | BODY MASS INDEX: 20.62 KG/M2 | HEART RATE: 102 BPM | WEIGHT: 160.69 LBS | SYSTOLIC BLOOD PRESSURE: 149 MMHG

## 2022-01-24 DIAGNOSIS — N52.01 ERECTILE DYSFUNCTION DUE TO ARTERIAL INSUFFICIENCY: Primary | ICD-10-CM

## 2022-01-24 PROCEDURE — 3288F PR FALLS RISK ASSESSMENT DOCUMENTED: ICD-10-PCS | Mod: CPTII,S$GLB,, | Performed by: UROLOGY

## 2022-01-24 PROCEDURE — 1160F PR REVIEW ALL MEDS BY PRESCRIBER/CLIN PHARMACIST DOCUMENTED: ICD-10-PCS | Mod: CPTII,S$GLB,, | Performed by: UROLOGY

## 2022-01-24 PROCEDURE — 1125F PR PAIN SEVERITY QUANTIFIED, PAIN PRESENT: ICD-10-PCS | Mod: CPTII,S$GLB,, | Performed by: UROLOGY

## 2022-01-24 PROCEDURE — 1160F RVW MEDS BY RX/DR IN RCRD: CPT | Mod: CPTII,S$GLB,, | Performed by: UROLOGY

## 2022-01-24 PROCEDURE — 3288F FALL RISK ASSESSMENT DOCD: CPT | Mod: CPTII,S$GLB,, | Performed by: UROLOGY

## 2022-01-24 PROCEDURE — 99024 POSTOP FOLLOW-UP VISIT: CPT | Mod: S$GLB,,, | Performed by: UROLOGY

## 2022-01-24 PROCEDURE — 99999 PR PBB SHADOW E&M-EST. PATIENT-LVL IV: CPT | Mod: PBBFAC,,, | Performed by: UROLOGY

## 2022-01-24 PROCEDURE — 1159F MED LIST DOCD IN RCRD: CPT | Mod: CPTII,S$GLB,, | Performed by: UROLOGY

## 2022-01-24 PROCEDURE — 1159F PR MEDICATION LIST DOCUMENTED IN MEDICAL RECORD: ICD-10-PCS | Mod: CPTII,S$GLB,, | Performed by: UROLOGY

## 2022-01-24 PROCEDURE — 1101F PT FALLS ASSESS-DOCD LE1/YR: CPT | Mod: CPTII,S$GLB,, | Performed by: UROLOGY

## 2022-01-24 PROCEDURE — 3077F SYST BP >= 140 MM HG: CPT | Mod: CPTII,S$GLB,, | Performed by: UROLOGY

## 2022-01-24 PROCEDURE — 1125F AMNT PAIN NOTED PAIN PRSNT: CPT | Mod: CPTII,S$GLB,, | Performed by: UROLOGY

## 2022-01-24 PROCEDURE — 1101F PR PT FALLS ASSESS DOC 0-1 FALLS W/OUT INJ PAST YR: ICD-10-PCS | Mod: CPTII,S$GLB,, | Performed by: UROLOGY

## 2022-01-24 PROCEDURE — 99999 PR PBB SHADOW E&M-EST. PATIENT-LVL IV: ICD-10-PCS | Mod: PBBFAC,,, | Performed by: UROLOGY

## 2022-01-24 PROCEDURE — 3078F DIAST BP <80 MM HG: CPT | Mod: CPTII,S$GLB,, | Performed by: UROLOGY

## 2022-01-24 PROCEDURE — 99024 PR POST-OP FOLLOW-UP VISIT: ICD-10-PCS | Mod: S$GLB,,, | Performed by: UROLOGY

## 2022-01-24 PROCEDURE — 3078F PR MOST RECENT DIASTOLIC BLOOD PRESSURE < 80 MM HG: ICD-10-PCS | Mod: CPTII,S$GLB,, | Performed by: UROLOGY

## 2022-01-24 PROCEDURE — 3077F PR MOST RECENT SYSTOLIC BLOOD PRESSURE >= 140 MM HG: ICD-10-PCS | Mod: CPTII,S$GLB,, | Performed by: UROLOGY

## 2022-01-24 RX ORDER — OXAPROZIN 600 MG/1
1200 TABLET, FILM COATED ORAL DAILY
Qty: 60 TABLET | Refills: 0 | Status: SHIPPED | OUTPATIENT
Start: 2022-01-24 | End: 2022-02-23

## 2022-01-24 NOTE — PROGRESS NOTES
CHIEF COMPLAINT:    Mr. Waite is a 81 y.o. male presenting with peyronie's.    PRESENTING ILLNESS:    Carolann Waite is a 81 y.o. male who c/o peyronie's disease and severe ED.  He's s/p placement of a 3 piece AMS IPP with modeling on 12/14/21.  He c/o penile and scrotal pain.    REVIEW OF SYSTEMS:    Carolann Waite denies headache, blurred vision, fever, nausea, vomiting, chills, abdominal pain, chest pain, sore throat, bleeding per rectum, cough, SOB, recent loss of consciousness, recent mental status changes, seizures, dizziness, or upper or lower extremity weakness.    LUIS MANUEL  1. 1  2. 0  3. 0  4. 0  5. 0      PATIENT HISTORY:    Past Medical History:   Diagnosis Date    Arthritis     Cataract     OU    Colon polyps     Diabetes     Diabetes mellitus, type 2     Diverticulosis     Gout     History of colon polyps 10/8/2014    HTN (hypertension)     Hyperlipidemia LDL goal <100     Impotence     Melanoma     Left forearm, s/p excision    Squamous cell carcinoma     Head/nose       Past Surgical History:   Procedure Laterality Date    cancer of skin (nose)  2/2014    basal cell ca    CIRCUMCISION, PRIMARY      COLONOSCOPY  7/31/2009  Radhames    Normal colon and TI.    COLONOSCOPY  10/08/2014    Dr. Ricci, repeat in 6-7 years    COLONOSCOPY N/A 1/18/2019    Procedure: COLONOSCOPY;  Surgeon: Ron Ricci Jr., MD;  Location: [facility] ENDO;  Service: Endoscopy;  Laterality: N/A;    COLONOSCOPY W/ POLYPECTOMY  8/7/2006  Radhames    Three cecal polyps, largest 4mm x 12mm.  TUBULAR ADENOMAS.  One 2 mm in the hepatic flexure.  TUBULAR ADENOMAS.    EPIDURAL STEROID INJECTION INTO CERVICAL SPINE N/A 8/15/2018    Procedure: Injection-steroid-epidural-cervical;  Surgeon: Keny Ibanez MD;  Location: [facility] OR;  Service: Pain Management;  Laterality: N/A;  to left    FLEXIBLE SIGMOIDOSCOPY  ~2001  Paigeers    INSERTION OF INFLATABLE PENILE PROSTHESIS N/A 12/14/2021    Procedure: INSERTION, PENILE PROSTHESIS,  INFLATABLE;  Surgeon: Juventino Rodriguez MD;  Location: [facility] OR [facility] FLR;  Service: Urology;  Laterality: N/A;  1.5hr    RECONSTRUCTION OF PENIS N/A 12/14/2021    Procedure: RECONSTRUCTION, PENIS remodeling;  Surgeon: Juventino Rodriguez MD;  Location: [facility] OR 2ND FLR;  Service: Urology;  Laterality: N/A;  1.5hr    removal of lipoma      R arm    removal of melanoma  2009    L arm    TONSILLECTOMY      UPPER GASTROINTESTINAL ENDOSCOPY  in his 30's    normal findings per patient report       Family History   Problem Relation Age of Onset    Diabetes Mother     Heart disease Brother 56        mi    Cancer Brother         Prostate    Heart attack Brother     Cancer Father         lymphoma    Hypertension Father     Nephrolithiasis Father     Colon cancer Neg Hx     Colon polyps Neg Hx     Crohn's disease Neg Hx     Ulcerative colitis Neg Hx     Stomach cancer Neg Hx     Esophageal cancer Neg Hx        Social History     Socioeconomic History    Marital status:    Tobacco Use    Smoking status: Current Some Day Smoker     Types: Cigars    Smokeless tobacco: Never Used    Tobacco comment: smokes a cigar on occasion   Substance and Sexual Activity    Alcohol use: Yes     Comment: 1 drink per month    Drug use: No    Sexual activity: Yes     Partners: Female       Allergies:  No known drug allergies    Medications:    Current Outpatient Medications:     ACCU-CHEK MILENA PLUS METER Misc, USE AS DIRECTED, Disp: 1 each, Rfl: 1    ACCU-CHEK MILENA PLUS TEST STRP Strp, CHECK BLOOD SUGAR TWICE DAILY, Disp: 200 strip, Rfl: 11    ACCU-CHEK SOFTCLIX LANCETS Misc, , Disp: , Rfl:     amLODIPine (NORVASC) 10 MG tablet, TAKE 1 TABLET EVERY DAY, Disp: 90 tablet, Rfl: 0    atorvastatin (LIPITOR) 20 MG tablet, TAKE 1 TABLET EVERY DAY, Disp: 90 tablet, Rfl: 2    ergocalciferol, vitamin D2, (VITAMIN D ORAL), Take by mouth once daily., Disp: , Rfl:     etodolac (LODINE) 200 MG Cap, Take 1 capsule (200 mg total)  by mouth every 8 (eight) hours as needed. To use for gout attack related pain instead of indocin., Disp: 90 capsule, Rfl: 1    fexofenadine (ALLEGRA) 180 MG tablet, Take 1 tablet by mouth daily as needed., Disp: , Rfl:     fluticasone (FLONASE) 50 mcg/actuation nasal spray, 2 sprays by Nasal route daily as needed. 1 - 2 Spray(s) Nasal daily., Disp: 16 g, Rfl: 2    Lactobacillus rhamnosus GG (CULTURELLE) 10 billion cell capsule, Take 1 capsule by mouth once daily., Disp: , Rfl:     losartan (COZAAR) 100 MG tablet, Take 100 mg by mouth once daily., Disp: , Rfl:     oxyCODONE-acetaminophen (PERCOCET) 5-325 mg per tablet, Take 1 tablet by mouth every 4 (four) hours as needed., Disp: 10 tablet, Rfl: 0    pantoprazole (PROTONIX) 40 MG tablet, TAKE 1 TABLET EVERY DAY, Disp: 90 tablet, Rfl: 0    polyethylene glycol (GLYCOLAX) 17 gram/dose powder, Mix 1 capful (17 g) with a liquid and take by mouth once daily., Disp: 238 g, Rfl: 0    RESTASIS 0.05 % ophthalmic emulsion, Place into both eyes 2 (two) times daily., Disp: , Rfl:     SITagliptin (JANUVIA) 100 MG Tab, Take 100 mg by mouth once daily., Disp: , Rfl:     triamcinolone acetonide 0.1% (KENALOG) 0.1 % cream, , Disp: , Rfl:     PHYSICAL EXAMINATION:    The patient generally appears in good health, is appropriately interactive, and is in no apparent distress.     Eyes: anicteric sclerae, moist conjunctivae; no lid-lag; PERRLA     HENT: Atraumatic; oropharynx clear with moist mucous membranes and no mucosal ulcerations;normal hard and soft palate.  No evidence of lymphadenopathy.    Neck: Trachea midline.  No thyromegaly.    Skin: No lesions.    Mental: Cooperative with normal affect.  Is oriented to time, place, and person.    Neuro: Grossly intact.    Chest: Normal inspiratory effort.   No accessory muscles.  No audible wheezes.  Respirations symmetric on inspiration and expiration.    Heart: Regular rhythm.      Abdomen:  Soft, non-tender. No masses or  organomegaly. Bladder is not palpable. No evidence of flank discomfort. No evidence of inguinal hernia.    Genitourinary: The penis is circumcised with a plaque c/w peyronie's on the shaft. The urethral meatus is normal. The testes, epididymides, and cord structures are normal in size and contour bilaterally. The scrotum is normal in size and contour. The IPP components are palpable in the penis and scrotum.    LYDIA done by Dr. Gutierrez 10/2021.  Deferred.    Extremities: No clubbing, cyanosis, or edema      LABS:    UA dipped negative today  Lab Results   Component Value Date    PSA 2.6 08/23/2021    PSA 3.7 11/06/2017    PSA 1.6 05/17/2017    PSADIAG 2.1 08/22/2018       IMPRESSION:    Encounter Diagnoses   Name Primary?    Erectile dysfunction due to arterial insufficiency Yes   HTN, stable  Hyperlipidemia, stable  DM, stable      PLAN:    1. Discussed that his pump has migrated cephelad on him.  Will daypro to help with the pain.  Side effects discussed.  A new Rx was given.  Discussed that if this does not alleviate his pain, he may need surgical revision to relocate the pump.  2. RTC 2 weeks to inflate/deflate with an CHASIDY.  3. RTC 3 months.    Copy to: Matt

## 2022-01-31 ENCOUNTER — TELEPHONE (OUTPATIENT)
Dept: UROLOGY | Facility: CLINIC | Age: 82
End: 2022-01-31
Payer: MEDICARE

## 2022-01-31 NOTE — TELEPHONE ENCOUNTER
----- Message from Patti Dominguez sent at 1/31/2022  8:21 AM CST -----  Regarding: speak with nurse  Contact: patient  [phone]   please call patient said the device the doctor placed in him is causing very bad pain said he would like to get in ASAP have not slept because of this waiting on a call back ASAP thanks.

## 2022-01-31 NOTE — TELEPHONE ENCOUNTER
Call placed to patient. Name and date of birth verified. Patient stated he would like to been seen today for pain. Patient informed the healing process following surgery is different for each individual. Patient informed to give medication time to work for pain, as he was recently seen for the same reason, and given medication. Patient offered appointment today with CHASIDY and declined stated he lives in [place]. Patient scheduled to the next available appointment 2/7/22 with Mk. Patient verbalized that he would rather an appointment with . Patient explained  protocol for scheduling with CHASIDY. Patient verbalized understanding.

## 2022-02-07 ENCOUNTER — OFFICE VISIT (OUTPATIENT)
Dept: UROLOGY | Facility: CLINIC | Age: 82
End: 2022-02-07
Payer: MEDICARE

## 2022-02-07 VITALS
BODY MASS INDEX: 20.93 KG/M2 | HEIGHT: 74 IN | HEART RATE: 93 BPM | DIASTOLIC BLOOD PRESSURE: 76 MMHG | SYSTOLIC BLOOD PRESSURE: 150 MMHG | WEIGHT: 163.13 LBS

## 2022-02-07 DIAGNOSIS — Z96.0 S/P INSERTION OF PENILE IMPLANT: ICD-10-CM

## 2022-02-07 DIAGNOSIS — Z98.890 POST-OPERATIVE STATE: Primary | ICD-10-CM

## 2022-02-07 PROCEDURE — 3077F SYST BP >= 140 MM HG: CPT | Mod: CPTII,S$GLB,, | Performed by: NURSE PRACTITIONER

## 2022-02-07 PROCEDURE — 1101F PT FALLS ASSESS-DOCD LE1/YR: CPT | Mod: CPTII,S$GLB,, | Performed by: NURSE PRACTITIONER

## 2022-02-07 PROCEDURE — 3077F PR MOST RECENT SYSTOLIC BLOOD PRESSURE >= 140 MM HG: ICD-10-PCS | Mod: CPTII,S$GLB,, | Performed by: NURSE PRACTITIONER

## 2022-02-07 PROCEDURE — 99024 POSTOP FOLLOW-UP VISIT: CPT | Mod: S$GLB,,, | Performed by: NURSE PRACTITIONER

## 2022-02-07 PROCEDURE — 3288F PR FALLS RISK ASSESSMENT DOCUMENTED: ICD-10-PCS | Mod: CPTII,S$GLB,, | Performed by: NURSE PRACTITIONER

## 2022-02-07 PROCEDURE — 1159F MED LIST DOCD IN RCRD: CPT | Mod: CPTII,S$GLB,, | Performed by: NURSE PRACTITIONER

## 2022-02-07 PROCEDURE — 3288F FALL RISK ASSESSMENT DOCD: CPT | Mod: CPTII,S$GLB,, | Performed by: NURSE PRACTITIONER

## 2022-02-07 PROCEDURE — 1101F PR PT FALLS ASSESS DOC 0-1 FALLS W/OUT INJ PAST YR: ICD-10-PCS | Mod: CPTII,S$GLB,, | Performed by: NURSE PRACTITIONER

## 2022-02-07 PROCEDURE — 99999 PR PBB SHADOW E&M-EST. PATIENT-LVL IV: ICD-10-PCS | Mod: PBBFAC,,, | Performed by: NURSE PRACTITIONER

## 2022-02-07 PROCEDURE — 3078F DIAST BP <80 MM HG: CPT | Mod: CPTII,S$GLB,, | Performed by: NURSE PRACTITIONER

## 2022-02-07 PROCEDURE — 1126F AMNT PAIN NOTED NONE PRSNT: CPT | Mod: CPTII,S$GLB,, | Performed by: NURSE PRACTITIONER

## 2022-02-07 PROCEDURE — 3078F PR MOST RECENT DIASTOLIC BLOOD PRESSURE < 80 MM HG: ICD-10-PCS | Mod: CPTII,S$GLB,, | Performed by: NURSE PRACTITIONER

## 2022-02-07 PROCEDURE — 1126F PR PAIN SEVERITY QUANTIFIED, NO PAIN PRESENT: ICD-10-PCS | Mod: CPTII,S$GLB,, | Performed by: NURSE PRACTITIONER

## 2022-02-07 PROCEDURE — 1159F PR MEDICATION LIST DOCUMENTED IN MEDICAL RECORD: ICD-10-PCS | Mod: CPTII,S$GLB,, | Performed by: NURSE PRACTITIONER

## 2022-02-07 PROCEDURE — 99999 PR PBB SHADOW E&M-EST. PATIENT-LVL IV: CPT | Mod: PBBFAC,,, | Performed by: NURSE PRACTITIONER

## 2022-02-07 PROCEDURE — 99024 PR POST-OP FOLLOW-UP VISIT: ICD-10-PCS | Mod: S$GLB,,, | Performed by: NURSE PRACTITIONER

## 2022-02-07 NOTE — PROGRESS NOTES
"  Carolann Waite is a 81 y.o. male who c/o peyronie's disease and severe ED.    He's s/p placement of a 3 piece AMS IPP with modeling on 12/14/2021 with Dr. Rodriguez.   Last clinic visit was 01/03/2022 with c/o penile and scrotal pain; he had notable migration of his pump. It was discussed the possible need for surgical revision.     Here today for evaluation and initial cycling.   He reports that he has been in constant pain to penis and scrotum since surgery.   He has been take pain meds (Daypro 1200mg daily) since last clinic visit with Dr. Rodriguez. Reports no relief; actually seems worse.   Waking him up at night.    He is concerned due to constant pain and "blackening" of scrotum after standing for long periods. The bulb moves back and forth causing pain.    I personally examined him.   Some resolving eccymosis to mid SP area.   Penis appears normal; (+) tenderness with palpation.   Scrotum is normal in color/no swelling.   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    Bulb is visually located in left upper side of scrotum.   Able to palpate;locate components but he complained of discomfort; does not want to cycle/return on 2/16/2022.    He aware of risk with revision.  Asked about an MRI to evaluate.   He would like an appt with Dr. Rodriguez to discuss and schedule revision.   "

## 2022-02-14 ENCOUNTER — OFFICE VISIT (OUTPATIENT)
Dept: UROLOGY | Facility: CLINIC | Age: 82
End: 2022-02-14
Payer: MEDICARE

## 2022-02-14 ENCOUNTER — TELEPHONE (OUTPATIENT)
Dept: UROLOGY | Facility: CLINIC | Age: 82
End: 2022-02-14

## 2022-02-14 VITALS
HEIGHT: 74 IN | HEART RATE: 85 BPM | WEIGHT: 163.13 LBS | SYSTOLIC BLOOD PRESSURE: 161 MMHG | BODY MASS INDEX: 20.93 KG/M2 | DIASTOLIC BLOOD PRESSURE: 81 MMHG

## 2022-02-14 DIAGNOSIS — T83.490A MALFUNCTION OF PENILE PROSTHESIS, INITIAL ENCOUNTER: Primary | ICD-10-CM

## 2022-02-14 DIAGNOSIS — I10 PRIMARY HYPERTENSION: ICD-10-CM

## 2022-02-14 DIAGNOSIS — E78.5 HYPERLIPIDEMIA LDL GOAL <100: ICD-10-CM

## 2022-02-14 DIAGNOSIS — E11.49 TYPE 2 DIABETES MELLITUS WITH NEUROLOGICAL MANIFESTATIONS: ICD-10-CM

## 2022-02-14 DIAGNOSIS — N52.01 ERECTILE DYSFUNCTION DUE TO ARTERIAL INSUFFICIENCY: ICD-10-CM

## 2022-02-14 DIAGNOSIS — N48.6 PEYRONIE'S DISEASE: ICD-10-CM

## 2022-02-14 PROCEDURE — 3288F PR FALLS RISK ASSESSMENT DOCUMENTED: ICD-10-PCS | Mod: CPTII,S$GLB,, | Performed by: UROLOGY

## 2022-02-14 PROCEDURE — 99999 PR PBB SHADOW E&M-EST. PATIENT-LVL IV: ICD-10-PCS | Mod: PBBFAC,,, | Performed by: UROLOGY

## 2022-02-14 PROCEDURE — 1101F PT FALLS ASSESS-DOCD LE1/YR: CPT | Mod: CPTII,S$GLB,, | Performed by: UROLOGY

## 2022-02-14 PROCEDURE — 1125F AMNT PAIN NOTED PAIN PRSNT: CPT | Mod: CPTII,S$GLB,, | Performed by: UROLOGY

## 2022-02-14 PROCEDURE — 1159F PR MEDICATION LIST DOCUMENTED IN MEDICAL RECORD: ICD-10-PCS | Mod: CPTII,S$GLB,, | Performed by: UROLOGY

## 2022-02-14 PROCEDURE — 1159F MED LIST DOCD IN RCRD: CPT | Mod: CPTII,S$GLB,, | Performed by: UROLOGY

## 2022-02-14 PROCEDURE — 3077F PR MOST RECENT SYSTOLIC BLOOD PRESSURE >= 140 MM HG: ICD-10-PCS | Mod: CPTII,S$GLB,, | Performed by: UROLOGY

## 2022-02-14 PROCEDURE — 3288F FALL RISK ASSESSMENT DOCD: CPT | Mod: CPTII,S$GLB,, | Performed by: UROLOGY

## 2022-02-14 PROCEDURE — 99024 PR POST-OP FOLLOW-UP VISIT: ICD-10-PCS | Mod: S$GLB,,, | Performed by: UROLOGY

## 2022-02-14 PROCEDURE — 1160F RVW MEDS BY RX/DR IN RCRD: CPT | Mod: CPTII,S$GLB,, | Performed by: UROLOGY

## 2022-02-14 PROCEDURE — 1125F PR PAIN SEVERITY QUANTIFIED, PAIN PRESENT: ICD-10-PCS | Mod: CPTII,S$GLB,, | Performed by: UROLOGY

## 2022-02-14 PROCEDURE — 1101F PR PT FALLS ASSESS DOC 0-1 FALLS W/OUT INJ PAST YR: ICD-10-PCS | Mod: CPTII,S$GLB,, | Performed by: UROLOGY

## 2022-02-14 PROCEDURE — 1160F PR REVIEW ALL MEDS BY PRESCRIBER/CLIN PHARMACIST DOCUMENTED: ICD-10-PCS | Mod: CPTII,S$GLB,, | Performed by: UROLOGY

## 2022-02-14 PROCEDURE — 3079F PR MOST RECENT DIASTOLIC BLOOD PRESSURE 80-89 MM HG: ICD-10-PCS | Mod: CPTII,S$GLB,, | Performed by: UROLOGY

## 2022-02-14 PROCEDURE — 99999 PR PBB SHADOW E&M-EST. PATIENT-LVL IV: CPT | Mod: PBBFAC,,, | Performed by: UROLOGY

## 2022-02-14 PROCEDURE — 99024 POSTOP FOLLOW-UP VISIT: CPT | Mod: S$GLB,,, | Performed by: UROLOGY

## 2022-02-14 PROCEDURE — 3077F SYST BP >= 140 MM HG: CPT | Mod: CPTII,S$GLB,, | Performed by: UROLOGY

## 2022-02-14 PROCEDURE — 3079F DIAST BP 80-89 MM HG: CPT | Mod: CPTII,S$GLB,, | Performed by: UROLOGY

## 2022-02-14 NOTE — PROGRESS NOTES
CHIEF COMPLAINT:    Mr. Waite is a 81 y.o. male presenting with peyronie's.    PRESENTING ILLNESS:    Carolann Waite is a 81 y.o. male who c/o peyronie's disease and severe ED.  He's s/p placement of a 3 piece AMS IPP with modeling on 12/14/21.  He c/o penile and scrotal pain.  His pump has migrated cephalad somewhat and is painful for him.  He's been on daypro which has helped some, but it is not satisfactory.    REVIEW OF SYSTEMS:    Carolann Waite denies headache, blurred vision, fever, nausea, vomiting, chills, abdominal pain, chest pain, sore throat, bleeding per rectum, cough, SOB, recent loss of consciousness, recent mental status changes, seizures, dizziness, or upper or lower extremity weakness.    LUIS MANUEL  1. 1  2. 0  3. 0  4. 0  5. 0      PATIENT HISTORY:    Past Medical History:   Diagnosis Date    Arthritis     Cataract     OU    Colon polyps     Diabetes     Diabetes mellitus, type 2     Diverticulosis     Gout     History of colon polyps 10/8/2014    HTN (hypertension)     Hyperlipidemia LDL goal <100     Impotence     Melanoma     Left forearm, s/p excision    Squamous cell carcinoma     Head/nose       Past Surgical History:   Procedure Laterality Date    cancer of skin (nose)  2/2014    basal cell ca    CIRCUMCISION, PRIMARY      COLONOSCOPY  7/31/2009  Radhames    Normal colon and TI.    COLONOSCOPY  10/08/2014    Dr. Ricci, repeat in 6-7 years    COLONOSCOPY N/A 1/18/2019    Procedure: COLONOSCOPY;  Surgeon: Ron Ricci Jr., MD;  Location: [facility];  Service: Endoscopy;  Laterality: N/A;    COLONOSCOPY W/ POLYPECTOMY  8/7/2006  Radhames    Three cecal polyps, largest 4mm x 12mm.  TUBULAR ADENOMAS.  One 2 mm in the hepatic flexure.  TUBULAR ADENOMAS.    EPIDURAL STEROID INJECTION INTO CERVICAL SPINE N/A 8/15/2018    Procedure: Injection-steroid-epidural-cervical;  Surgeon: Keny Ibanez MD;  Location: [facility] OR;  Service: Pain Management;  Laterality: N/A;  to left    FLEXIBLE  SIGMOIDOSCOPY  ~2001  Paigeers    INSERTION OF INFLATABLE PENILE PROSTHESIS N/A 12/14/2021    Procedure: INSERTION, PENILE PROSTHESIS, INFLATABLE;  Surgeon: Juventino Rodriguez MD;  Location: [facility] OR 2ND FLR;  Service: Urology;  Laterality: N/A;  1.5hr    RECONSTRUCTION OF PENIS N/A 12/14/2021    Procedure: RECONSTRUCTION, PENIS remodeling;  Surgeon: Juventino Rodriguez MD;  Location: [facility] OR 2ND FLR;  Service: Urology;  Laterality: N/A;  1.5hr    removal of lipoma      R arm    removal of melanoma  2009    L arm    TONSILLECTOMY      UPPER GASTROINTESTINAL ENDOSCOPY  in his 30's    normal findings per patient report       Family History   Problem Relation Age of Onset    Diabetes Mother     Heart disease Brother 56        mi    Cancer Brother         Prostate    Heart attack Brother     Cancer Father         lymphoma    Hypertension Father     Nephrolithiasis Father     Colon cancer Neg Hx     Colon polyps Neg Hx     Crohn's disease Neg Hx     Ulcerative colitis Neg Hx     Stomach cancer Neg Hx     Esophageal cancer Neg Hx        Social History     Socioeconomic History    Marital status:    Tobacco Use    Smoking status: Current Some Day Smoker     Types: Cigars    Smokeless tobacco: Never Used    Tobacco comment: smokes a cigar on occasion   Substance and Sexual Activity    Alcohol use: Yes     Comment: 1 drink per month    Drug use: No    Sexual activity: Yes     Partners: Female       Allergies:  No known drug allergies    Medications:    Current Outpatient Medications:     ACCU-CHEK MILENA PLUS METER Misc, USE AS DIRECTED, Disp: 1 each, Rfl: 1    ACCU-CHEK MILENA PLUS TEST STRP Strp, CHECK BLOOD SUGAR TWICE DAILY, Disp: 200 strip, Rfl: 11    ACCU-CHEK SOFTCLIX LANCETS Misc, , Disp: , Rfl:     amLODIPine (NORVASC) 10 MG tablet, TAKE 1 TABLET EVERY DAY, Disp: 90 tablet, Rfl: 0    atorvastatin (LIPITOR) 20 MG tablet, TAKE 1 TABLET EVERY DAY, Disp: 90 tablet, Rfl: 2    ergocalciferol,  vitamin D2, (VITAMIN D ORAL), Take by mouth once daily., Disp: , Rfl:     etodolac (LODINE) 200 MG Cap, Take 1 capsule (200 mg total) by mouth every 8 (eight) hours as needed. To use for gout attack related pain instead of indocin., Disp: 90 capsule, Rfl: 1    fexofenadine (ALLEGRA) 180 MG tablet, Take 1 tablet by mouth daily as needed., Disp: , Rfl:     fluticasone (FLONASE) 50 mcg/actuation nasal spray, 2 sprays by Nasal route daily as needed. 1 - 2 Spray(s) Nasal daily., Disp: 16 g, Rfl: 2    Lactobacillus rhamnosus GG (CULTURELLE) 10 billion cell capsule, Take 1 capsule by mouth once daily., Disp: , Rfl:     losartan (COZAAR) 100 MG tablet, Take 100 mg by mouth once daily., Disp: , Rfl:     oxaprozin (DAYPRO) 600 mg tablet, Take 2 tablets (1,200 mg total) by mouth once daily. Take with food., Disp: 60 tablet, Rfl: 0    oxyCODONE-acetaminophen (PERCOCET) 5-325 mg per tablet, Take 1 tablet by mouth every 4 (four) hours as needed., Disp: 10 tablet, Rfl: 0    pantoprazole (PROTONIX) 40 MG tablet, TAKE 1 TABLET EVERY DAY, Disp: 90 tablet, Rfl: 0    polyethylene glycol (GLYCOLAX) 17 gram/dose powder, Mix 1 capful (17 g) with a liquid and take by mouth once daily., Disp: 238 g, Rfl: 0    RESTASIS 0.05 % ophthalmic emulsion, Place into both eyes 2 (two) times daily., Disp: , Rfl:     SITagliptin (JANUVIA) 100 MG Tab, Take 100 mg by mouth once daily., Disp: , Rfl:     triamcinolone acetonide 0.1% (KENALOG) 0.1 % cream, , Disp: , Rfl:     PHYSICAL EXAMINATION:    The patient generally appears in good health, is appropriately interactive, and is in no apparent distress.     Eyes: anicteric sclerae, moist conjunctivae; no lid-lag; PERRLA     HENT: Atraumatic; oropharynx clear with moist mucous membranes and no mucosal ulcerations;normal hard and soft palate.  No evidence of lymphadenopathy.    Neck: Trachea midline.  No thyromegaly.    Skin: No lesions.    Mental: Cooperative with normal affect.  Is oriented to  time, place, and person.    Neuro: Grossly intact.    Chest: Normal inspiratory effort.   No accessory muscles.  No audible wheezes.  Respirations symmetric on inspiration and expiration.    Heart: Regular rhythm.      Abdomen:  Soft, non-tender. No masses or organomegaly. Bladder is not palpable. No evidence of flank discomfort. No evidence of inguinal hernia.    Genitourinary: The penis is circumcised with a plaque c/w peyronie's on the shaft. The urethral meatus is normal. The testes, epididymides, and cord structures are normal in size and contour bilaterally. The scrotum is normal in size and contour. The IPP components are palpable in the penis and scrotum.    LYDIA done by Dr. Gutierrez 10/2021.  Deferred.    Extremities: No clubbing, cyanosis, or edema      LABS:    UA dipped negative today  Lab Results   Component Value Date    PSA 2.6 08/23/2021    PSA 3.7 11/06/2017    PSA 1.6 05/17/2017    PSADIAG 2.1 08/22/2018       IMPRESSION:    Encounter Diagnoses   Name Primary?    Malfunction of penile prosthesis, initial encounter Yes    Peyronie's disease     Erectile dysfunction due to arterial insufficiency     Type 2 diabetes mellitus with neurological manifestations     Primary hypertension     Hyperlipidemia LDL goal <100    HTN, stable  Hyperlipidemia, stable  DM, stable      PLAN:    1. Discussed options for his severe ED (affected by above comorbidities). His pump has migrated cephelad on him.  Discussed surgical repair.  He'd like this done.  Discussed that revision surgery carries a higher risk of infection.  Discussed possible replacement as well.  2. Patient read the IPP handout and understands the risks associated with this procedure. He knows the risk of infection as well as surgery requirements if it were to become infected. He understands the impact on spontaneous and nocturnal erections, as well as need for replacement and repair, which was clearly outlined in verbal and written form. He was  given the chance to ask questions and alternatives were discussed.  3. Risks discussed were You have elected to undergo placement of a penile prosthesis. Several devices are currently available in the marketplace, including those which are non-inflatable, versus two- or three-piece inflatable prostheses. All of these devices have the capacity to give you the opportunity to have a rigid penis on demand and to be used as frequently and as long as you would like. There are, therefore, many advantages to the use of the prosthesis which you have already discussed with your physician. The goal of this informed consent form is to identify the potential problems that have been recognized to occur with penile prosthesis placement and that you understand the risks of undergoing prosthetic placement. It is important to recognize that as a result of improvements in design as well as better surgical technique, that all of the risks listed below are less than they were even 10 years ago. It is also important to recognize that most of the available studies report on historical data for devices which are now either not manufactured or have undergone structural improvements to reduce those side effects. The complications are simply noted in random order and do not reflect their frequency.    1. Prosthesis mechanical failure occurs as a result of leakage of fluid from the inflatable types of devices. This typically occurs as a result of a fracture in the tubing, most commonly as it emerges out of the scrotal pump, but it can also be due to a leak from the erectile cylinders in the penis. It is felt that this occurs as a result of repetitive mechanical trauma, which weakens the tubing and causes it to crack. When the fluid leaks, it is not something you would be aware of. It does not cause pain. The fluid contained within the device is typically a sterile saline solution that will simply be reabsorbed by the body. What you will  "recognize is that when you squeeze the pump, there will be no transfer of fluid and no rigidity or inadequate rigidity. The most recent long-term data looking at 5-10 year success reports mechanical failure in the 5-10% range over that period of time. Looked at another way, 90-95% of men will have a functional prosthesis in 10 years. These devices are designed to be used for life. Each company has its own warrantee which you should discuss with your physician.    2. Infection is a disastrous complication which usually requires the removal of the prosthesis, as it is rare to be able to clear infection so long as the prosthesis is within the body. Occasionally, the infected prosthesis can be removed, the location of the device can be washed out vigorously with a special antibiotic salvage procedure and then a new device may be able to be immediately placed. When this is not possible, the infected device is removed and then a period of healing will follow where the device can be replaced after a period of healing (6 weeks to 6 months). Delayed replacement of a prosthesis after initial removal is a more complicated operation associated with the potential for not being able to replace the device because of scarring but other problems such as shortening of the penis or change in sensation and shape may also occur.    As a result of improved surgical technique and device design, infection rates are now markedly reduced, typically being reported in the <1-2% range for new prosthesis placements and up to 3% when the prosthesis is uninfected and has mechanically failed.    3. Bleeding and hematoma are rarely a problem. There is likely to be localized swelling as well as "black and blue" of the penis, groin area, and scrotal sack. These will resolve without treatment over 1-3 weeks. Rarely a scrotal hematoma (collection of blood) will develop which can be treated with rest; it will reabsorb with time or, if it is growing in " size or painful, it may need to be evacuated surgically (opened up and washed out). The risk of needing a blood transfusion after penile prosthesis placement is extremely rare to nonexistent.    4. Post-operative pain is variable and can be minimal, but in most men it is quite significant. Your physician will provide you with adequate pain medication to help control the pain, but not necessarily eliminate it entirely. As the prosthesis heals, there will be complete resolution of the pain, such that you will typically not even be aware that the prosthesis is within your body unless you were to touch it directly. Complete pain resolution will most commonly occur within 4-12 weeks postoperatively. Post-operative pain is typically managed with oral narcotic agents. You should be aware that these drugs can cause constipation as well as sleepiness; therefore, you should not be in any position where you might need to make important decision or driving until the pain is under better control without the need for narcotic pain killers. It is recommended that during the first several days after the operation, that you spend as little time as possible on your feet, as this will encourage healing, reduce swelling, and result in more rapid resolution of pain.    5. Loss of length -  Penile shortening is probably the reason that most men are disappointed with the outcome from their prosthesis surgery. Studies have shown that when the flaccid penile stretched length is measured preoperatively, that the actual loss of length following placement of the prosthesis is on average no more than one centimeter (1/3 inch). To reduce the likelihood of loss of length, the surgeon will do his best to place the proper size device that fits your penis. You can expect that the length of your penis will be much like what you see when you grab the head of the penis and pull it straight out away from your body. Many men who believe they have lost  length in their penis following prosthesis surgery in fact did not really lose length because of the surgery, but rather because they have not had had a full erection for some time during which there may have been some loss of tissue elasticity and thereby shortening of the penis. In addition, they may have gained some weight in the pubic area which will cover the penis and make it look shorter. Lastly, they may be expecting that the postoperative result will be like the erections they had when they were a much younger man. Although the goal is to make the penis as long as possible, one can expect that the rigidity of the penis will be much like the erections obtained as a younger man.    6. Decreased girth - Girth is typically not substantially changed as most cylinders can expand and fill the penis satisfactorily, but if there has been scarring within the tissues of the penis, this can prevent expansion and result in a narrower appearing penis. The surgeon will do his best to put the largest cylinder in the penis, but there are limitations to which the tissues can expand. Decreased girth is not a common complaint.    7. Sensory loss - By and large the surgical techniques being used to avoid injury to the sensory nerves of the penis. Therefore, there is rarely any significant change in the sexual sensation of the penis. Some men find that it takes longer for them to experience orgasm. This may occur because they can simply inflate the penis without any sexuall arousal, and then it will seem to take longer for them to become aroused, resulting in the prolonged time to orgasm. Therefore, proper sexual stimulation is important to enjoy the entire sexual experience with a prosthesis.    8. Change in shape - The overall shape or configuration of the penis is rarely altered as a result of placement of any type of prosthesis, but if there is some unidentified scarring involving the penis such as that which occurs with  "Peyronie's disease, some curvature or indentations including hourglass narrowing can be identified along the shaft. Typically, in the postoperative period, the prosthesis will cause an internal tissue expansion which will correct these deformities. This may take 6-9 months occur.    9. Erosion or cylinder extrusion - If the tissues in the tips of the penis are weakened either by previous internal penile disease or as a result of the surgery, the prosthetic cylinder may migrate distally into the head of the penis and may appear to be ready to poke through the skin or the urethra. By all means, if such an irregularity is seen, one should address it with your doctor before the prosthesis is exposed so that it can be corrected without developing infection. This problem occurs in <1% of cases.    10. Pump problems - These include difficulty in activating or deactivating the pump as well as change of pump location due to migration or fixation of the pump to the scrotal skin. These problems are also quite unusual but can happen as a result of an altered healing process or a malposition of the pump by the implanting surgeon. If the pump were to migrate or become fixed or difficult to manipulate because of its position, a simple outpatient scrotal procedure can be performed to reposition the pump which is almost universally successful. This procedure is performed in no more than 1% of cases.    Prosthesis care - In the postoperative period, it is best to take the antibiotics prescribed by your physician, reduce your physical activity to reduce swelling and enhance healing, take pain medicine for your comfort, and avoid submergingthe incision during bathing for a minimum of 1-4 weeks. Specific bathing instructions will be issued by your physician. It is not uncommon to have an inflatable prosthesis partially fill during the postoperative period involuntarily. This is called "auto-inflation." To prevent this problem, it is " "important that once the prosthesis is activated, which is typically 4-6 weeks after surgery, that you perform what is known as "cycling" of the device. Cycling means complete inflation and then complete deflation of the penile cylinders twice per day for one month. In doing this, the tissues around the prosthesis are softened and stretched allowing complete deflation of the device into the reservoir. It also will allow you to become more familiar with operating the device and make it easier for you to activate it quickly and inconspicuously when you want to engage in sexual relations. If you have an inflatable device with a scrotal pump, it is best to inflate it without twisting it. The repetitive twisting of the pump can weaken the connections between the tubing and the pump and is the most common cause for mechanical failure of the prosthesis.    It is hoped that this review will inform you of the potential problems associated with your prosthesis and as a result, will make for more realistic expectations regarding the outcome.      Copy to: Matt      "

## 2022-02-20 NOTE — PROGRESS NOTES
Urology ([facility]) H&P  Staff: Juventino Rodriguez MD    CC: malfunction of IPP    HPI:  Carolann Waite is a 81 y.o. male with a history of HTN, HLD, DM (last A1C 5.4) with a history of erectile dysfunction and peyronie's disease.    He had ~ 45 degrees of curve to the L and dorsally.  It was difficult to penetrate due to the curve.       He also c/o severe ED refractory to oral medication.    He is s/p IPP placement on 12/14/21. 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 This causes him left sided groin discomfort, has trouble getting comfortable. Also has some discomfort to the glans.     No recent illness, denies cough, congestion. No rashes or ongoing infection. Denies chest pain, SOB. No prior abdominal or groin surgery.    ROS:  Neg except per HPI    Past Medical History:   Diagnosis Date    Arthritis     Cataract     OU    Colon polyps     Diabetes     Diabetes mellitus, type 2     Diverticulosis     Gout     History of colon polyps 10/8/2014    HTN (hypertension)     Hyperlipidemia LDL goal <100     Impotence     Melanoma     Left forearm, s/p excision    Squamous cell carcinoma     Head/nose       Past Surgical History:   Procedure Laterality Date    cancer of skin (nose)  2/2014    basal cell ca    CIRCUMCISION, PRIMARY      COLONOSCOPY  7/31/2009  Radhames    Normal colon and TI.    COLONOSCOPY  10/08/2014    Dr. Ricci, repeat in 6-7 years    COLONOSCOPY N/A 1/18/2019    Procedure: COLONOSCOPY;  Surgeon: Ron Ricci Jr., MD;  Location: [facility] ENDO;  Service: Endoscopy;  Laterality: N/A;    COLONOSCOPY W/ POLYPECTOMY  8/7/2006  Radhames    Three cecal polyps, largest 4mm x 12mm.  TUBULAR ADENOMAS.  One 2 mm in the hepatic flexure.  TUBULAR ADENOMAS.    EPIDURAL STEROID INJECTION INTO CERVICAL SPINE N/A 8/15/2018    Procedure: Injection-steroid-epidural-cervical;  Surgeon: Keny Ibanez MD;  Location: [facility] OR;  Service: Pain  Management;  Laterality: N/A;  to left    FLEXIBLE SIGMOIDOSCOPY  ~2001  Paigeers    INSERTION OF INFLATABLE PENILE PROSTHESIS N/A 12/14/2021    Procedure: INSERTION, PENILE PROSTHESIS, INFLATABLE;  Surgeon: Juventino Rodriguez MD;  [facility];  Service: Urology;  Laterality: N/A;  1.5hr    RECONSTRUCTION OF PENIS N/A 12/14/2021    Procedure: RECONSTRUCTION, PENIS remodeling;  Surgeon: Juventino Rodriguez MD;  [facility];  Service: Urology;  Laterality: N/A;  1.5hr    removal of lipoma      R arm    removal of melanoma  2009    L arm    TONSILLECTOMY      UPPER GASTROINTESTINAL ENDOSCOPY  in his 30's    normal findings per patient report       Social History     Socioeconomic History    Marital status:    Tobacco Use    Smoking status: Current Some Day Smoker     Types: Cigars    Smokeless tobacco: Never Used    Tobacco comment: smokes a cigar on occasion   Substance and Sexual Activity    Alcohol use: Yes     Comment: 1 drink per month    Drug use: No    Sexual activity: Yes     Partners: Female       Family History   Problem Relation Age of Onset    Diabetes Mother     Heart disease Brother 56        mi    Cancer Brother         Prostate    Heart attack Brother     Cancer Father         lymphoma    Hypertension Father     Nephrolithiasis Father     Colon cancer Neg Hx     Colon polyps Neg Hx     Crohn's disease Neg Hx     Ulcerative colitis Neg Hx     Stomach cancer Neg Hx     Esophageal cancer Neg Hx        Review of patient's allergies indicates:   Allergen Reactions    No known drug allergies        Current Outpatient Medications on File Prior to Visit   Medication Sig Dispense Refill    ACCU-CHEK MILENA PLUS METER Misc USE AS DIRECTED 1 each 1    ACCU-CHEK MILENA PLUS TEST STRP Strp CHECK BLOOD SUGAR TWICE DAILY 200 strip 11    ACCU-CHEK SOFTCLIX LANCETS Misc       amLODIPine (NORVASC) 10 MG tablet TAKE 1 TABLET EVERY DAY 90 tablet 0    atorvastatin  "(LIPITOR) 20 MG tablet TAKE 1 TABLET EVERY DAY 90 tablet 2    ergocalciferol, vitamin D2, (VITAMIN D ORAL) Take by mouth once daily.      etodolac (LODINE) 200 MG Cap Take 1 capsule (200 mg total) by mouth every 8 (eight) hours as needed. To use for gout attack related pain instead of indocin. 90 capsule 1    fexofenadine (ALLEGRA) 180 MG tablet Take 1 tablet by mouth daily as needed.      fluticasone (FLONASE) 50 mcg/actuation nasal spray 2 sprays by Nasal route daily as needed. 1 - 2 Spray(s) Nasal daily. 16 g 2    Lactobacillus rhamnosus GG (CULTURELLE) 10 billion cell capsule Take 1 capsule by mouth once daily.      losartan (COZAAR) 100 MG tablet Take 100 mg by mouth once daily.      oxaprozin (DAYPRO) 600 mg tablet Take 2 tablets (1,200 mg total) by mouth once daily. Take with food. 60 tablet 0    oxyCODONE-acetaminophen (PERCOCET) 5-325 mg per tablet Take 1 tablet by mouth every 4 (four) hours as needed. 10 tablet 0    pantoprazole (PROTONIX) 40 MG tablet TAKE 1 TABLET EVERY DAY 90 tablet 0    polyethylene glycol (GLYCOLAX) 17 gram/dose powder Mix 1 capful (17 g) with a liquid and take by mouth once daily. 238 g 0    RESTASIS 0.05 % ophthalmic emulsion Place into both eyes 2 (two) times daily.      SITagliptin (JANUVIA) 100 MG Tab Take 100 mg by mouth once daily.      triamcinolone acetonide 0.1% (KENALOG) 0.1 % cream        No current facility-administered medications on file prior to visit.       Anticoagulation:  No    Physical Exam:  Estimated body mass index is 20.95 kg/m² as calculated from the following:    Height as of 2/14/22: 6' 2" (1.88 m).    Weight as of 2/14/22: 74 kg (163 lb 2.3 oz).    General: No acute distress, well developed. AAOx3  Head: Normocephalic, Atraumatic  Eyes: Extra-occular movements intact, No discharge  Neck: supple, symmetrical, trachea midline  Lungs: normal respiratory effort, no respiratory distress, no wheezes  CV: regular rate, 2+ pulses  Abdomen: soft, " non-tender, non-distended, no organomegaly  : Circumcised male with orthotopic meatus. Thorough skin examination from umbilicus to mid thigh bilaterally. There are multiple small angiomas, no erythema, rashes, pustules, or evidence of infectious process. Device components palpable, prior incision well healed. The reservoir is palpable in the superior left hemiscrotum, pump at posterior scrotum.  MSK: no edema, no deformities, normal ROM  Skin: skin color, texture, turgor normal.  Neurologic: no focal deficits, sensation intact    Labs:    Urine dipstick today - Urine negative for all tested components.    Lab Results   Component Value Date    WBC 5.77 08/23/2021    HGB 15.5 08/23/2021    HCT 46.7 08/23/2021    MCV 92 08/23/2021     08/23/2021           BMP  Lab Results   Component Value Date     08/23/2021    K 4.5 08/23/2021     08/23/2021    CO2 25 08/23/2021    BUN 24 (H) 08/23/2021    CREATININE 1.22 08/23/2021    CALCIUM 9.3 08/23/2021    ANIONGAP 11 08/23/2021    ESTGFRAFRICA >60 08/23/2021    EGFRNONAA 56 (A) 08/23/2021       Lab Results   Component Value Date    PSA 2.6 08/23/2021    PSA 3.7 11/06/2017    PSA 1.6 05/17/2017       Assessment: Carolann Waite is a 81 y.o. male with malfunction of IPP    Plan:     1. To OR on 3/10/22 for repair of IPP, possible replacement  2. Consents signed   3. I have explained the risk, benefits, and alternatives of the procedure in detail. The patient voices understanding and all questions have been answered. The patient agrees to proceed as planned.   4. Will repeat A1C today  5. Advised to hold Daypro, etodolac as well as all other NSAIDs preoperatively    Richard Buenrostro MD

## 2022-02-21 ENCOUNTER — OFFICE VISIT (OUTPATIENT)
Dept: UROLOGY | Facility: CLINIC | Age: 82
End: 2022-02-21
Payer: MEDICARE

## 2022-02-21 ENCOUNTER — LAB VISIT (OUTPATIENT)
Dept: LAB | Facility: HOSPITAL | Age: 82
End: 2022-02-21
Payer: MEDICARE

## 2022-02-21 DIAGNOSIS — T83.490A MALFUNCTION OF PENILE PROSTHESIS, INITIAL ENCOUNTER: ICD-10-CM

## 2022-02-21 DIAGNOSIS — T83.490A MALFUNCTION OF PENILE PROSTHESIS, INITIAL ENCOUNTER: Primary | ICD-10-CM

## 2022-02-21 LAB
ESTIMATED AVG GLUCOSE: 111 MG/DL (ref 68–131)
HBA1C MFR BLD: 5.5 % (ref 4–5.6)

## 2022-02-21 PROCEDURE — 99499 UNLISTED E&M SERVICE: CPT | Mod: S$GLB,,, | Performed by: UROLOGY

## 2022-02-21 PROCEDURE — 99499 NO LOS: ICD-10-PCS | Mod: S$GLB,,, | Performed by: UROLOGY

## 2022-02-21 PROCEDURE — 36415 COLL VENOUS BLD VENIPUNCTURE: CPT | Performed by: STUDENT IN AN ORGANIZED HEALTH CARE EDUCATION/TRAINING PROGRAM

## 2022-02-21 PROCEDURE — 83036 HEMOGLOBIN GLYCOSYLATED A1C: CPT | Performed by: STUDENT IN AN ORGANIZED HEALTH CARE EDUCATION/TRAINING PROGRAM

## 2022-03-07 ENCOUNTER — LAB VISIT (OUTPATIENT)
Dept: FAMILY MEDICINE | Facility: CLINIC | Age: 82
End: 2022-03-07
Payer: MEDICARE

## 2022-03-07 DIAGNOSIS — T83.490A MALFUNCTION OF PENILE PROSTHESIS, INITIAL ENCOUNTER: ICD-10-CM

## 2022-03-07 LAB — SARS-COV-2 RNA RESP QL NAA+PROBE: NOT DETECTED

## 2022-03-07 PROCEDURE — U0005 INFEC AGEN DETEC AMPLI PROBE: HCPCS | Performed by: UROLOGY

## 2022-03-07 PROCEDURE — U0003 INFECTIOUS AGENT DETECTION BY NUCLEIC ACID (DNA OR RNA); SEVERE ACUTE RESPIRATORY SYNDROME CORONAVIRUS 2 (SARS-COV-2) (CORONAVIRUS DISEASE [COVID-19]), AMPLIFIED PROBE TECHNIQUE, MAKING USE OF HIGH THROUGHPUT TECHNOLOGIES AS DESCRIBED BY CMS-2020-01-R: HCPCS | Performed by: UROLOGY

## 2022-03-09 ENCOUNTER — TELEPHONE (OUTPATIENT)
Dept: UROLOGY | Facility: CLINIC | Age: 82
End: 2022-03-09
Payer: MEDICARE

## 2022-03-09 NOTE — PRE-PROCEDURE INSTRUCTIONS
PREOP INSTRUCTIONS:No food,milk or milk products for 8 hours before surgery.Clear liquids like water,gatorade,apple juice are allowed up until 2 hours before surgery.Instructed to follow the surgeon's instructions if they differ from these.Shower instructions as well as directions to the Surgery Center were given.Encouraged to wear loose fitting,comfortable clothing.Medication instructions for pm prior to and am of procedure reviewed.Instructed to avoid taking vitamins,supplements,aspirin and ibuprofen the morning of surgery.    IPatient denies any side effects or issues with anesthesia or sedation.

## 2022-03-09 NOTE — TELEPHONE ENCOUNTER
Called pt to confirm arrival time of 845am for procedure on 3-10. Gave pt NPO instructions and gave pt opportunity to ask questions. Pt verbalized understanding.     Pt was informed that only 1 person would be allowed to accompany them the morning of surgery.  Pt verbalized understanding.

## 2022-03-10 ENCOUNTER — HOSPITAL ENCOUNTER (OUTPATIENT)
Facility: HOSPITAL | Age: 82
Discharge: HOME | End: 2022-03-10
Attending: UROLOGY | Admitting: UROLOGY
Payer: MEDICARE

## 2022-03-10 ENCOUNTER — TELEPHONE (OUTPATIENT)
Dept: UROLOGY | Facility: CLINIC | Age: 82
End: 2022-03-10
Payer: MEDICARE

## 2022-03-10 ENCOUNTER — PATIENT MESSAGE (OUTPATIENT)
Dept: UROLOGY | Facility: CLINIC | Age: 82
End: 2022-03-10
Payer: MEDICARE

## 2022-03-10 VITALS
SYSTOLIC BLOOD PRESSURE: 161 MMHG | TEMPERATURE: 98 F | WEIGHT: 163 LBS | DIASTOLIC BLOOD PRESSURE: 73 MMHG | HEIGHT: 74 IN | OXYGEN SATURATION: 99 % | HEART RATE: 66 BPM | RESPIRATION RATE: 18 BRPM | BODY MASS INDEX: 20.92 KG/M2

## 2022-03-10 DIAGNOSIS — N52.9 ERECTILE DYSFUNCTION: ICD-10-CM

## 2022-03-10 DIAGNOSIS — T83.490A MALFUNCTION OF PENILE PROSTHESIS, INITIAL ENCOUNTER: Primary | ICD-10-CM

## 2022-03-10 LAB — POCT GLUCOSE: 115 MG/DL (ref 70–110)

## 2022-03-10 PROCEDURE — 25000003 PHARM REV CODE 250: Performed by: STUDENT IN AN ORGANIZED HEALTH CARE EDUCATION/TRAINING PROGRAM

## 2022-03-10 PROCEDURE — 82962 GLUCOSE BLOOD TEST: CPT | Performed by: UROLOGY

## 2022-03-10 PROCEDURE — 25000003 PHARM REV CODE 250: Performed by: SURGERY

## 2022-03-10 PROCEDURE — 63600175 PHARM REV CODE 636 W HCPCS: Performed by: STUDENT IN AN ORGANIZED HEALTH CARE EDUCATION/TRAINING PROGRAM

## 2022-03-10 RX ORDER — ACETAMINOPHEN 325 MG/1
650 TABLET ORAL ONCE
Status: COMPLETED | OUTPATIENT
Start: 2022-03-10 | End: 2022-03-10

## 2022-03-10 RX ADMIN — ACETAMINOPHEN 650 MG: 325 TABLET ORAL at 08:03

## 2022-03-10 RX ADMIN — VANCOMYCIN HYDROCHLORIDE 1500 MG: 1.5 INJECTION, POWDER, LYOPHILIZED, FOR SOLUTION INTRAVENOUS at 08:03

## 2022-03-10 RX ADMIN — GENTAMICIN SULFATE 320 MG: 40 INJECTION, SOLUTION INTRAMUSCULAR; INTRAVENOUS at 08:03

## 2022-03-10 NOTE — PLAN OF CARE
Surgery cancelled per . Pt IV access removed and bleeding ocntrolled. Pt tolerated well. Pt dressed and awaiting an uber for transport to home. Charge nurse notified.

## 2022-03-10 NOTE — DISCHARGE SUMMARY
[facility]  Discharge Note  Short Stay    Admit Date: 3/10/2022    Discharge Date and Time: 03/10/2022 9:37 AM      Attending Physician: Juventino Rodriguez MD     Discharge Provider: Juventino Romero MD    Diagnoses:  There are no hospital problems to display for this patient.      Discharged Condition: stable    Hospital Course: Patient was admitted for an IPP but surgery was canceled due to positive stress test.    Final Diagnoses: Same as principal problem.    Disposition: Home or Self Care    Follow up/Patient Instructions:    Medications:  Reconciled Home Medications:   Current Discharge Medication List      CONTINUE these medications which have NOT CHANGED    Details   amLODIPine (NORVASC) 10 MG tablet TAKE 1 TABLET EVERY DAY  Qty: 90 tablet, Refills: 0      atorvastatin (LIPITOR) 20 MG tablet TAKE 1 TABLET EVERY DAY  Qty: 90 tablet, Refills: 2      ergocalciferol, vitamin D2, (VITAMIN D ORAL) Take by mouth once daily.      etodolac (LODINE) 200 MG Cap Take 1 capsule (200 mg total) by mouth every 8 (eight) hours as needed. To use for gout attack related pain instead of indocin.  Qty: 90 capsule, Refills: 1      Lactobacillus rhamnosus GG (CULTURELLE) 10 billion cell capsule Take 1 capsule by mouth once daily.      losartan (COZAAR) 100 MG tablet Take 100 mg by mouth once daily.      pantoprazole (PROTONIX) 40 MG tablet TAKE 1 TABLET EVERY DAY  Qty: 90 tablet, Refills: 0    Associated Diagnoses: Gastroesophageal reflux disease, esophagitis presence not specified      polyethylene glycol (GLYCOLAX) 17 gram/dose powder Mix 1 capful (17 g) with a liquid and take by mouth once daily.  Qty: 238 g, Refills: 0      RESTASIS 0.05 % ophthalmic emulsion Place into both eyes 2 (two) times daily.      SITagliptin (JANUVIA) 100 MG Tab Take 100 mg by mouth every morning.      fexofenadine (ALLEGRA) 180 MG tablet Take 1 tablet by mouth daily as needed.      fluticasone (FLONASE) 50 mcg/actuation nasal spray 2 sprays by  Nasal route daily as needed. 1 - 2 Spray(s) Nasal daily.  Qty: 16 g, Refills: 2      triamcinolone acetonide 0.1% (KENALOG) 0.1 % cream            No discharge procedures on file.

## 2022-03-15 PROBLEM — R94.39 ABNORMAL STRESS TEST: Status: ACTIVE | Noted: 2022-03-15

## 2022-03-24 PROBLEM — R94.39 ABNORMAL STRESS TEST: Status: RESOLVED | Noted: 2022-03-15 | Resolved: 2022-03-24

## 2022-04-05 ENCOUNTER — TELEPHONE (OUTPATIENT)
Dept: VASCULAR SURGERY | Facility: CLINIC | Age: 82
End: 2022-04-05
Payer: MEDICARE

## 2022-04-05 NOTE — TELEPHONE ENCOUNTER
Pt scheduled for po appt on 4/21 in [place]. [place] ([facility]) [phone] given verbal order to take out chest tube sutures at next visit.

## 2022-04-05 NOTE — TELEPHONE ENCOUNTER
----- Message from Chelsea Alonso sent at 4/5/2022  8:44 AM CDT -----  Regarding: advice  Contact: Patient/[phone] (home)  Type: Needs Medical Advice  Who Called:  Patient/[phone] (home)       Additional Information: Patient had surgery and was told that the office was going to make him a post op in two weeks. It has been over two weeks. Please call with availability. Thanks.

## 2022-04-06 ENCOUNTER — DOCUMENT SCAN (OUTPATIENT)
Dept: HOME HEALTH SERVICES | Facility: HOSPITAL | Age: 82
End: 2022-04-06
Payer: MEDICARE

## 2022-04-20 ENCOUNTER — DOCUMENT SCAN (OUTPATIENT)
Dept: HOME HEALTH SERVICES | Facility: HOSPITAL | Age: 82
End: 2022-04-20
Payer: MEDICARE

## 2022-04-20 NOTE — TELEPHONE ENCOUNTER
----- Message from Caroline Valentine sent at 10/10/2017 12:49 PM CDT -----  Patient is returning nurses call, suly patient at [phone].    Thank you    No

## 2022-04-21 ENCOUNTER — OFFICE VISIT (OUTPATIENT)
Dept: VASCULAR SURGERY | Facility: CLINIC | Age: 82
End: 2022-04-21
Payer: MEDICARE

## 2022-04-21 VITALS
WEIGHT: 162.25 LBS | DIASTOLIC BLOOD PRESSURE: 62 MMHG | BODY MASS INDEX: 20.82 KG/M2 | HEART RATE: 49 BPM | HEIGHT: 74 IN | SYSTOLIC BLOOD PRESSURE: 126 MMHG

## 2022-04-21 DIAGNOSIS — Z95.1 S/P CABG X 4: Primary | ICD-10-CM

## 2022-04-21 PROCEDURE — 3288F PR FALLS RISK ASSESSMENT DOCUMENTED: ICD-10-PCS | Mod: CPTII,S$GLB,, | Performed by: THORACIC SURGERY (CARDIOTHORACIC VASCULAR SURGERY)

## 2022-04-21 PROCEDURE — 99024 PR POST-OP FOLLOW-UP VISIT: ICD-10-PCS | Mod: S$GLB,,, | Performed by: THORACIC SURGERY (CARDIOTHORACIC VASCULAR SURGERY)

## 2022-04-21 PROCEDURE — 3078F DIAST BP <80 MM HG: CPT | Mod: CPTII,S$GLB,, | Performed by: THORACIC SURGERY (CARDIOTHORACIC VASCULAR SURGERY)

## 2022-04-21 PROCEDURE — 1126F PR PAIN SEVERITY QUANTIFIED, NO PAIN PRESENT: ICD-10-PCS | Mod: CPTII,S$GLB,, | Performed by: THORACIC SURGERY (CARDIOTHORACIC VASCULAR SURGERY)

## 2022-04-21 PROCEDURE — 3288F FALL RISK ASSESSMENT DOCD: CPT | Mod: CPTII,S$GLB,, | Performed by: THORACIC SURGERY (CARDIOTHORACIC VASCULAR SURGERY)

## 2022-04-21 PROCEDURE — 3074F PR MOST RECENT SYSTOLIC BLOOD PRESSURE < 130 MM HG: ICD-10-PCS | Mod: CPTII,S$GLB,, | Performed by: THORACIC SURGERY (CARDIOTHORACIC VASCULAR SURGERY)

## 2022-04-21 PROCEDURE — 99999 PR PBB SHADOW E&M-EST. PATIENT-LVL II: CPT | Mod: PBBFAC,,, | Performed by: THORACIC SURGERY (CARDIOTHORACIC VASCULAR SURGERY)

## 2022-04-21 PROCEDURE — 1101F PT FALLS ASSESS-DOCD LE1/YR: CPT | Mod: CPTII,S$GLB,, | Performed by: THORACIC SURGERY (CARDIOTHORACIC VASCULAR SURGERY)

## 2022-04-21 PROCEDURE — 1126F AMNT PAIN NOTED NONE PRSNT: CPT | Mod: CPTII,S$GLB,, | Performed by: THORACIC SURGERY (CARDIOTHORACIC VASCULAR SURGERY)

## 2022-04-21 PROCEDURE — 1101F PR PT FALLS ASSESS DOC 0-1 FALLS W/OUT INJ PAST YR: ICD-10-PCS | Mod: CPTII,S$GLB,, | Performed by: THORACIC SURGERY (CARDIOTHORACIC VASCULAR SURGERY)

## 2022-04-21 PROCEDURE — 99024 POSTOP FOLLOW-UP VISIT: CPT | Mod: S$GLB,,, | Performed by: THORACIC SURGERY (CARDIOTHORACIC VASCULAR SURGERY)

## 2022-04-21 PROCEDURE — 99999 PR PBB SHADOW E&M-EST. PATIENT-LVL II: ICD-10-PCS | Mod: PBBFAC,,, | Performed by: THORACIC SURGERY (CARDIOTHORACIC VASCULAR SURGERY)

## 2022-04-21 PROCEDURE — 3074F SYST BP LT 130 MM HG: CPT | Mod: CPTII,S$GLB,, | Performed by: THORACIC SURGERY (CARDIOTHORACIC VASCULAR SURGERY)

## 2022-04-21 PROCEDURE — 3078F PR MOST RECENT DIASTOLIC BLOOD PRESSURE < 80 MM HG: ICD-10-PCS | Mod: CPTII,S$GLB,, | Performed by: THORACIC SURGERY (CARDIOTHORACIC VASCULAR SURGERY)

## 2022-04-21 NOTE — PROGRESS NOTES
This patient is status post coronary artery bypass grafting.  He comes back to the office today in follow-up.  He has done well without major complaint.  Medicines are noted and are part of the epic record.  His problem list was reviewed.  On exam vital signs are stable.  His pulse is 49.  His surgical wounds are clean and dry.  He is doing well status post coronary artery bypass grafting.  He is scheduled to sees cardiologist within the near future for medical follow-up.  I told him to decrease the amount of Norvasc.  Otherwise he should take daily aspirin.  He can see me on an as-needed basis.  His prognosis should be fairly good.

## 2022-04-27 ENCOUNTER — EXTERNAL HOME HEALTH (OUTPATIENT)
Dept: HOME HEALTH SERVICES | Facility: HOSPITAL | Age: 82
End: 2022-04-27
Payer: MEDICARE

## 2022-08-16 PROBLEM — N40.1 BPH WITH URINARY OBSTRUCTION: Status: RESOLVED | Noted: 2021-11-08 | Resolved: 2022-08-16

## 2022-08-16 PROBLEM — N13.8 BPH WITH URINARY OBSTRUCTION: Status: RESOLVED | Noted: 2021-11-08 | Resolved: 2022-08-16

## 2022-08-19 ENCOUNTER — TELEPHONE (OUTPATIENT)
Dept: PREADMISSION TESTING | Facility: HOSPITAL | Age: 82
End: 2022-08-19
Payer: MEDICARE

## 2022-08-19 RX ORDER — ASPIRIN 81 MG/1
81 TABLET ORAL DAILY
COMMUNITY
End: 2023-01-11

## 2022-08-19 RX ORDER — AMOXICILLIN 500 MG/1
500 CAPSULE ORAL 3 TIMES DAILY
Status: ON HOLD | COMMUNITY
Start: 2022-05-02 | End: 2022-09-08 | Stop reason: CLARIF

## 2022-08-19 RX ORDER — METOPROLOL SUCCINATE 25 MG/1
TABLET, EXTENDED RELEASE ORAL
Status: ON HOLD | COMMUNITY
Start: 2022-06-21 | End: 2022-09-08 | Stop reason: CLARIF

## 2022-08-19 NOTE — ANESTHESIA PAT ROS NOTE
08/19/2022  Carolann Waite is a 81 y.o., male.      Pre-op Assessment          Review of Systems  Anesthesia Hx:  No problems with previous Anesthesia             Denies Family Hx of Anesthesia complications.    Denies Personal Hx of Anesthesia complications.                    Social:  Former Smoker, Social Alcohol Use       Hematology/Oncology:  Hematology Normal                       --  Cancer in past history:       Other (see Oncology comments)       surgery   Oncology Comments: BCC & SCC     EENT/Dental:   Wears glasses/ x2 Crowns  &  Permnt. Bridge-Top          Cardiovascular:  Exercise tolerance: good   Hypertension   CAD  asymptomatic         hyperlipidemia    Walking daily/ Housework//Yardwork/Mows lawn/Plays golf weekly/rides bike                         Pulmonary:  Pneumonia       x25 yrs. ago               Renal/:     Malfunction of penile prosthesis,initial encounter             Hepatic/GI:     GERD             Musculoskeletal:     Feet & back-neck stiffness            Neurological:    Neuromuscular Disease,       Cervical radiculopathy                            Endocrine:  Diabetes, type 2   A.M. blood glucose-Normally-        Dermatological:  Dry skin on arms & skin   Psych:  Psychiatric Normal              Libia Reid RN  8/19/22       Anesthesia Assessment: Preoperative EQUATION    Planned Procedure: Procedure(s) (LRB):  REPAIR IPP (N/A)  REPLACEMENT, PENILE PROSTHESIS, INFLATABLE (N/A)  Requested Anesthesia Type:General  Surgeon: Juventino Rodriguez MD  Service: Urology  Known or anticipated Date of Surgery:9/8/2022    Surgeon notes: reviewed and Malfunction of penile prostesis, initial encounter    Previous anesthesia records: 3/20/22-CABG W/LIMA x 4 Vessels Surgical Procurement, Vein, Endoscopic-Left-General  -No PONV    Anesthesia Hx:  No problems with previous Anesthesia   "  Airway patency: patent/Respiratory status: intubated    Airway:  Mallampati: II   Mouth Opening: Normal  TM Distance: Normal  Neck ROM: Normal ROM      Last PCP note: outside [facility] , Roy C. Sagu[place], MD-General -Internist-CLEMENTINA Robles    Subspecialty notes: Cardiology: General, Vascular Surgery    Other important co-morbidities: DM2, HLD and HTN    Medical History    Diagnosis Date Comment Source   Arthritis      Cataract  OU    Colon polyps      Diabetes      Diabetes mellitus, type 2      Diverticulosis      Gout      History of colon polyps 10/8/2014     HTN (hypertension)      Hyperlipidemia LDL goal <100      Impotence      Melanoma  Left forearm, s/p excision    Squamous cell carcinoma  Head/nose        Tests already available:  Results have been reviewed. CXR-3/20/22/ EKG-8/16/22        Plan: Phone pending     Testing:  A1C, BMP and Hematology Profile      Patient  has previously scheduled Medical Appointment:None    Navigation:Phone Completed                        Tests Scheduled. A1C, BMP and Hematology Profile done 8/22/22,    & reviewed by Dr. Paul.          Consults scheduled.Cards "Clearance" not needed per review by Dr. Hinton  Patient recently had a visit with Dr. Kulwinder Francis(Cards)-(recent visit on 8/16/22).                 Libia Reid RN  8/19/22 & 8/30/22            "

## 2022-08-21 NOTE — H&P (VIEW-ONLY)
Urology ([facility]) H&P  Staff: Juventino Rodriguez MD    CC: Malfunction of IPP    HPI:  Carolann Waite is a 81 y.o. male with a history of HTN, HLD, DM (last A1C 5.5) with a history of erectile dysfunction and peyronie's disease.     He had ~ 45 degrees of curve to the L and dorsally.  It was difficult to penetrate due to the curve.       He also c/o severe ED refractory to oral medication.    He is s/p IPP placement on 12/14/21. 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 He claims this had gotten worse today. This causes him left sided groin discomfort, has trouble getting comfortable. Also has some discomfort to the glans. He has never been able to cycle his device due to pain.     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 Patient is currently taking ASA, but not Plavix. He was seen by his cardiologist on 8/16/22 who cleared him for surgery.  He has T2DM, well controlled on PO medication, most recent A1c 5.5 on 2/21/2022.       No recent illness, denies cough, congestion. No rashes or ongoing infection. Denies chest pain, SOB. No prior abdominal or groin surgery.    ROS:  Neg except per HPI    Past Medical History:   Diagnosis Date    Arthritis     Cataract     OU    Colon polyps     Diabetes     Diabetes mellitus, type 2     Diverticulosis     Gout     History of colon polyps 10/8/2014    HTN (hypertension)     Hyperlipidemia LDL goal <100     Impotence     Melanoma     Left forearm, s/p excision    Squamous cell carcinoma     Head/nose       Past Surgical History:   Procedure Laterality Date    cancer of skin (nose)  02/2014    basal cell ca    CIRCUMCISION, PRIMARY      COLONOSCOPY  7/31/2009  Aubrey    Normal colon and TI.    COLONOSCOPY  10/08/2014    Dr. Ricci, repeat in 6-7 years    COLONOSCOPY N/A 01/18/2019    Procedure:  COLONOSCOPY;  Surgeon: Ron Ricci Jr., MD;  Location: [facility] ENDO;  Service: Endoscopy;  Laterality: N/A;    COLONOSCOPY W/ POLYPECTOMY  2006  Radhames    Three cecal polyps, largest 4mm x 12mm.  TUBULAR ADENOMAS.  One 2 mm in the hepatic flexure.  TUBULAR ADENOMAS.    CORONARY ARTERY BYPASS GRAFT (CABG) N/A 3/20/2022    Procedure: CABG W/ LIMA X 4 VESSELS;  Surgeon: Catarino Banuelos MD;  Location: [facility] OR;  Service: Cardiovascular;  Laterality: N/A;    ENDOSCOPIC HARVEST OF VEIN Left 3/20/2022    Procedure: SURGICAL PROCUREMENT, VEIN, ENDOSCOPIC;  Surgeon: Catarino Banuelos MD;  Location: [facility] OR;  Service: Cardiovascular;  Laterality: Left;    EPIDURAL STEROID INJECTION INTO CERVICAL SPINE N/A 08/15/2018    Procedure: Injection-steroid-epidural-cervical;  Surgeon: Keny Ibanez MD;  Location: [facility] OR;  Service: Pain Management;  Laterality: N/A;  to left    FLEXIBLE SIGMOIDOSCOPY  ~2001    INSERTION OF INFLATABLE PENILE PROSTHESIS N/A 2021    Procedure: INSERTION, PENILE PROSTHESIS, INFLATABLE;  Surgeon: Juventino Rodriguez MD;  Location: [facility] OR 2ND FLR;  Service: Urology;  Laterality: N/A;  1.5hr    LEFT HEART CATHETERIZATION N/A 3/15/2022    Procedure: Left heart cath;  Surgeon: Kulwinder Francis III, MD;  Location: [facility] CATH;  Service: Cardiology;  Laterality: N/A;    RECONSTRUCTION OF PENIS N/A 2021    Procedure: RECONSTRUCTION, PENIS remodeling;  Surgeon: Juventino Rodriguez MD;  Location: [facility] OR 2ND FLR;  Service: Urology;  Laterality: N/A;  1.5hr    removal of lipoma      R arm    removal of melanoma      L arm    TONSILLECTOMY      UPPER GASTROINTESTINAL ENDOSCOPY  in his 30's    normal findings per patient report       Social History     Socioeconomic History    Marital status:    Tobacco Use    Smoking status: Former Smoker     Types: Cigars     Quit date: 3/20/2022     Years since quittin.4    Smokeless tobacco: Never Used    Tobacco  comment: smokes a cigar on occasion   Substance and Sexual Activity    Alcohol use: Not Currently     Comment: 1 drink per month    Drug use: No    Sexual activity: Yes     Partners: Female       Family History   Problem Relation Age of Onset    Diabetes Mother     Cancer Father         lymphoma    Hypertension Father     Nephrolithiasis Father     Heart disease Brother 56        mi    Cancer Brother         Prostate    Heart attack Brother     Colon cancer Neg Hx     Colon polyps Neg Hx     Crohn's disease Neg Hx     Ulcerative colitis Neg Hx     Stomach cancer Neg Hx     Esophageal cancer Neg Hx     Anesthesia problems Neg Hx        Review of patient's allergies indicates:   Allergen Reactions    Hydrocodone Itching       Current Outpatient Medications on File Prior to Visit   Medication Sig Dispense Refill    acetaminophen (TYLENOL) 325 MG tablet Take 2 tablets (650 mg total) by mouth every 6 (six) hours as needed for Pain.  0    amLODIPine (NORVASC) 10 MG tablet TAKE 1 TABLET EVERY DAY 90 tablet 0    amoxicillin (AMOXIL) 500 MG capsule Take 500 mg by mouth 3 (three) times daily.      aspirin (ECOTRIN) 81 MG EC tablet Take 81 mg by mouth once daily. Takes as a preventative      atorvastatin (LIPITOR) 20 MG tablet TAKE 1 TABLET EVERY DAY 90 tablet 2    blood sugar diagnostic (ACCU-CHEK MILENA PLUS TEST STRP MISC) 1 strip by Misc.(Non-Drug; Combo Route) route 2 (two) times a day.      etodolac (LODINE) 200 MG Cap Take 1 capsule (200 mg total) by mouth every 8 (eight) hours as needed. To use for gout attack related pain instead of indocin. 90 capsule 1    fexofenadine (ALLEGRA) 180 MG tablet Take 180 mg by mouth daily as needed (post nasal drip).      fluticasone (FLONASE) 50 mcg/actuation nasal spray 2 sprays by Nasal route daily as needed. 1 - 2 Spray(s) Nasal daily. 16 g 2    Lactobacillus acidophilus (PROBIOTIC ORAL) Take 1 tablet by mouth every morning.      losartan (COZAAR) 100 MG  "tablet Take 50 mg by mouth every morning.      metoprolol succinate (TOPROL-XL) 25 MG 24 hr tablet       mupirocin (BACTROBAN) 2 % ointment Apply topically 2 (two) times daily as needed (sores).      pantoprazole (PROTONIX) 40 MG tablet TAKE 1 TABLET EVERY DAY 90 tablet 0    polyethylene glycol (GLYCOLAX) 17 gram/dose powder Mix 1 capful (17 g) with a liquid and take by mouth once daily. 238 g 0    RESTASIS 0.05 % ophthalmic emulsion Place 1 drop into both eyes 2 (two) times daily.      SITagliptin (JANUVIA) 100 MG Tab Take 50 mg by mouth every morning.      triamcinolone acetonide 0.1% (KENALOG) 0.1 % cream Apply topically daily as needed (itch).      vitamin D (VITAMIN D3) 1000 units Tab Take 1,000 Units by mouth every morning.       No current facility-administered medications on file prior to visit.       Anticoagulation:  Yes ASA 81mg    Physical Exam:  Estimated body mass index is 21.18 kg/m² as calculated from the following:    Height as of 8/19/22: 6' 2" (1.88 m).    Weight as of 8/19/22: 74.8 kg (165 lb).    General: No acute distress, well developed. AAOx3  Head: Normocephalic, Atraumatic  Eyes: Extra-occular movements intact, No discharge  Neck: supple, symmetrical, trachea midline  Lungs: normal respiratory effort, no respiratory distress, no wheezes  CV: regular rate, 2+ pulses  Abdomen: soft, non-tender, non-distended, no organomegaly  : Circumcised male with orthotopic meatus. Thorough skin examination from umbilicus to mid thigh bilaterally. There are multiple small cherry angiomas, no erythema, rashes, pustules, or evidence of infectious process. Device components palpable, prior incision well healed. The reservoir is palpable in the superior left hemiscrotum, pump at posterior scrotum.   MSK: no edema, no deformities, normal ROM  Skin: skin color, texture, turgor normal.  Neurologic: no focal deficits, sensation intact    Labs:    Urine dipstick today - negative for all tested components. "     Lab Results   Component Value Date    WBC 7.53 04/07/2022    HGB 12.5 (L) 04/07/2022    HCT 38.3 (L) 04/07/2022    MCV 96 04/07/2022     04/07/2022           BMP  Lab Results   Component Value Date     (L) 04/07/2022    K 5.2 (H) 04/07/2022     04/07/2022    CO2 25 04/07/2022    BUN 26 (H) 04/07/2022    CREATININE 1.24 04/07/2022    CALCIUM 9.6 04/07/2022    ANIONGAP 10 04/07/2022    ESTGFRAFRICA >60 04/07/2022    EGFRNONAA 55 (A) 04/07/2022       Lab Results   Component Value Date    PSA 2.6 08/23/2021    PSA 3.7 11/06/2017    PSA 1.6 05/17/2017       Imaging:  None.    Assessment: Carolann Waite is a 81 y.o. male with  HTN, HLD, DM (last A1C 5.5) with a history of erectile dysfunction and peyronie's disease s/p IPP. Patient had malfunction of IPP with migration of reservoir and scrotal pump.      Plan:     1. To OR on 9/8/22 for repair of IPP, possible explant and reimplant.  2. Consents signed   3. I have explained the risk, benefits, and alternatives of the procedure in detail. The patient voices understanding and all questions have been answered. The patient agrees to proceed as planned.   4. Cardiologist Dr. Francis contacted, states patient is cleared to hold anticoagulation for 1 week before surgery.    5. Advised to hold Daypro, etodolac as well as all other NSAIDs preoperatively.  6. A1c 5.8 8/22/2022. Labs WNL    Fernando Thompson MD

## 2022-08-21 NOTE — PROGRESS NOTES
Urology ([facility]) H&P  Staff: Juventino Rodriguez MD    CC: Malfunction of IPP    HPI:  Carolann Waite is a 81 y.o. male with a history of HTN, HLD, DM (last A1C 5.5) with a history of erectile dysfunction and peyronie's disease.     He had ~ 45 degrees of curve to the L and dorsally.  It was difficult to penetrate due to the curve.       He also c/o severe ED refractory to oral medication.    He is s/p IPP placement on 12/14/21. 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 He claims this had gotten worse today. This causes him left sided groin discomfort, has trouble getting comfortable. Also has some discomfort to the glans. He has never been able to cycle his device due to pain.     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 Patient is currently taking ASA, but not Plavix. He was seen by his cardiologist on 8/16/22 who cleared him for surgery.  He has T2DM, well controlled on PO medication, most recent A1c 5.5 on 2/21/2022.       No recent illness, denies cough, congestion. No rashes or ongoing infection. Denies chest pain, SOB. No prior abdominal or groin surgery.    ROS:  Neg except per HPI    Past Medical History:   Diagnosis Date    Arthritis     Cataract     OU    Colon polyps     Diabetes     Diabetes mellitus, type 2     Diverticulosis     Gout     History of colon polyps 10/8/2014    HTN (hypertension)     Hyperlipidemia LDL goal <100     Impotence     Melanoma     Left forearm, s/p excision    Squamous cell carcinoma     Head/nose       Past Surgical History:   Procedure Laterality Date    cancer of skin (nose)  02/2014    basal cell ca    CIRCUMCISION, PRIMARY      COLONOSCOPY  7/31/2009  Aubrey    Normal colon and TI.    COLONOSCOPY  10/08/2014    Dr. Ricci, repeat in 6-7 years    COLONOSCOPY N/A 01/18/2019    Procedure:  COLONOSCOPY;  Surgeon: Ron Ricci Jr., MD;  Location: [facility] ENDO;  Service: Endoscopy;  Laterality: N/A;    COLONOSCOPY W/ POLYPECTOMY  2006  Radhames    Three cecal polyps, largest 4mm x 12mm.  TUBULAR ADENOMAS.  One 2 mm in the hepatic flexure.  TUBULAR ADENOMAS.    CORONARY ARTERY BYPASS GRAFT (CABG) N/A 3/20/2022    Procedure: CABG W/ LIMA X 4 VESSELS;  Surgeon: Catarino Banuelos MD;  Location: [facility] OR;  Service: Cardiovascular;  Laterality: N/A;    ENDOSCOPIC HARVEST OF VEIN Left 3/20/2022    Procedure: SURGICAL PROCUREMENT, VEIN, ENDOSCOPIC;  Surgeon: Catarino Banuelos MD;  Location: [facility] OR;  Service: Cardiovascular;  Laterality: Left;    EPIDURAL STEROID INJECTION INTO CERVICAL SPINE N/A 08/15/2018    Procedure: Injection-steroid-epidural-cervical;  Surgeon: Keny Ibanez MD;  Location: [facility] OR;  Service: Pain Management;  Laterality: N/A;  to left    FLEXIBLE SIGMOIDOSCOPY  ~2001    INSERTION OF INFLATABLE PENILE PROSTHESIS N/A 2021    Procedure: INSERTION, PENILE PROSTHESIS, INFLATABLE;  Surgeon: Juventino Rodriguez MD;  Location: [facility] OR 2ND FLR;  Service: Urology;  Laterality: N/A;  1.5hr    LEFT HEART CATHETERIZATION N/A 3/15/2022    Procedure: Left heart cath;  Surgeon: Kulwinder Francis III, MD;  Location: [facility] CATH;  Service: Cardiology;  Laterality: N/A;    RECONSTRUCTION OF PENIS N/A 2021    Procedure: RECONSTRUCTION, PENIS remodeling;  Surgeon: Juventino Rodriguez MD;  Location: [facility] OR 2ND FLR;  Service: Urology;  Laterality: N/A;  1.5hr    removal of lipoma      R arm    removal of melanoma      L arm    TONSILLECTOMY      UPPER GASTROINTESTINAL ENDOSCOPY  in his 30's    normal findings per patient report       Social History     Socioeconomic History    Marital status:    Tobacco Use    Smoking status: Former Smoker     Types: Cigars     Quit date: 3/20/2022     Years since quittin.4    Smokeless tobacco: Never Used    Tobacco  comment: smokes a cigar on occasion   Substance and Sexual Activity    Alcohol use: Not Currently     Comment: 1 drink per month    Drug use: No    Sexual activity: Yes     Partners: Female       Family History   Problem Relation Age of Onset    Diabetes Mother     Cancer Father         lymphoma    Hypertension Father     Nephrolithiasis Father     Heart disease Brother 56        mi    Cancer Brother         Prostate    Heart attack Brother     Colon cancer Neg Hx     Colon polyps Neg Hx     Crohn's disease Neg Hx     Ulcerative colitis Neg Hx     Stomach cancer Neg Hx     Esophageal cancer Neg Hx     Anesthesia problems Neg Hx        Review of patient's allergies indicates:   Allergen Reactions    Hydrocodone Itching       Current Outpatient Medications on File Prior to Visit   Medication Sig Dispense Refill    acetaminophen (TYLENOL) 325 MG tablet Take 2 tablets (650 mg total) by mouth every 6 (six) hours as needed for Pain.  0    amLODIPine (NORVASC) 10 MG tablet TAKE 1 TABLET EVERY DAY 90 tablet 0    amoxicillin (AMOXIL) 500 MG capsule Take 500 mg by mouth 3 (three) times daily.      aspirin (ECOTRIN) 81 MG EC tablet Take 81 mg by mouth once daily. Takes as a preventative      atorvastatin (LIPITOR) 20 MG tablet TAKE 1 TABLET EVERY DAY 90 tablet 2    blood sugar diagnostic (ACCU-CHEK MILENA PLUS TEST STRP MISC) 1 strip by Misc.(Non-Drug; Combo Route) route 2 (two) times a day.      etodolac (LODINE) 200 MG Cap Take 1 capsule (200 mg total) by mouth every 8 (eight) hours as needed. To use for gout attack related pain instead of indocin. 90 capsule 1    fexofenadine (ALLEGRA) 180 MG tablet Take 180 mg by mouth daily as needed (post nasal drip).      fluticasone (FLONASE) 50 mcg/actuation nasal spray 2 sprays by Nasal route daily as needed. 1 - 2 Spray(s) Nasal daily. 16 g 2    Lactobacillus acidophilus (PROBIOTIC ORAL) Take 1 tablet by mouth every morning.      losartan (COZAAR) 100 MG  "tablet Take 50 mg by mouth every morning.      metoprolol succinate (TOPROL-XL) 25 MG 24 hr tablet       mupirocin (BACTROBAN) 2 % ointment Apply topically 2 (two) times daily as needed (sores).      pantoprazole (PROTONIX) 40 MG tablet TAKE 1 TABLET EVERY DAY 90 tablet 0    polyethylene glycol (GLYCOLAX) 17 gram/dose powder Mix 1 capful (17 g) with a liquid and take by mouth once daily. 238 g 0    RESTASIS 0.05 % ophthalmic emulsion Place 1 drop into both eyes 2 (two) times daily.      SITagliptin (JANUVIA) 100 MG Tab Take 50 mg by mouth every morning.      triamcinolone acetonide 0.1% (KENALOG) 0.1 % cream Apply topically daily as needed (itch).      vitamin D (VITAMIN D3) 1000 units Tab Take 1,000 Units by mouth every morning.       No current facility-administered medications on file prior to visit.       Anticoagulation:  Yes ASA 81mg    Physical Exam:  Estimated body mass index is 21.18 kg/m² as calculated from the following:    Height as of 8/19/22: 6' 2" (1.88 m).    Weight as of 8/19/22: 74.8 kg (165 lb).    General: No acute distress, well developed. AAOx3  Head: Normocephalic, Atraumatic  Eyes: Extra-occular movements intact, No discharge  Neck: supple, symmetrical, trachea midline  Lungs: normal respiratory effort, no respiratory distress, no wheezes  CV: regular rate, 2+ pulses  Abdomen: soft, non-tender, non-distended, no organomegaly  : Circumcised male with orthotopic meatus. Thorough skin examination from umbilicus to mid thigh bilaterally. There are multiple small cherry angiomas, no erythema, rashes, pustules, or evidence of infectious process. Device components palpable, prior incision well healed. The reservoir is palpable in the superior left hemiscrotum, pump at posterior scrotum.   MSK: no edema, no deformities, normal ROM  Skin: skin color, texture, turgor normal.  Neurologic: no focal deficits, sensation intact    Labs:    Urine dipstick today - negative for all tested components. "     Lab Results   Component Value Date    WBC 7.53 04/07/2022    HGB 12.5 (L) 04/07/2022    HCT 38.3 (L) 04/07/2022    MCV 96 04/07/2022     04/07/2022           BMP  Lab Results   Component Value Date     (L) 04/07/2022    K 5.2 (H) 04/07/2022     04/07/2022    CO2 25 04/07/2022    BUN 26 (H) 04/07/2022    CREATININE 1.24 04/07/2022    CALCIUM 9.6 04/07/2022    ANIONGAP 10 04/07/2022    ESTGFRAFRICA >60 04/07/2022    EGFRNONAA 55 (A) 04/07/2022       Lab Results   Component Value Date    PSA 2.6 08/23/2021    PSA 3.7 11/06/2017    PSA 1.6 05/17/2017       Imaging:  None.    Assessment: Carolann Waite is a 81 y.o. male with  HTN, HLD, DM (last A1C 5.5) with a history of erectile dysfunction and peyronie's disease s/p IPP. Patient had malfunction of IPP with migration of reservoir and scrotal pump.      Plan:     1. To OR on 9/8/22 for repair of IPP, possible explant and reimplant.  2. Consents signed   3. I have explained the risk, benefits, and alternatives of the procedure in detail. The patient voices understanding and all questions have been answered. The patient agrees to proceed as planned.   4. Cardiologist Dr. Francis contacted, states patient is cleared to hold anticoagulation for 1 week before surgery.    5. Advised to hold Daypro, etodolac as well as all other NSAIDs preoperatively.  6. A1c 5.8 8/22/2022. Labs WNL    Fernando Thompson MD

## 2022-08-22 ENCOUNTER — LAB VISIT (OUTPATIENT)
Dept: LAB | Facility: HOSPITAL | Age: 82
End: 2022-08-22
Attending: ANESTHESIOLOGY
Payer: MEDICARE

## 2022-08-22 ENCOUNTER — OFFICE VISIT (OUTPATIENT)
Dept: UROLOGY | Facility: CLINIC | Age: 82
End: 2022-08-22
Payer: MEDICARE

## 2022-08-22 DIAGNOSIS — I10 PRIMARY HYPERTENSION: ICD-10-CM

## 2022-08-22 DIAGNOSIS — N52.01 ERECTILE DYSFUNCTION DUE TO ARTERIAL INSUFFICIENCY: Primary | ICD-10-CM

## 2022-08-22 DIAGNOSIS — Z01.818 PREOP TESTING: ICD-10-CM

## 2022-08-22 DIAGNOSIS — N48.6 PEYRONIE'S DISEASE: ICD-10-CM

## 2022-08-22 LAB
ANION GAP SERPL CALC-SCNC: 8 MMOL/L (ref 8–16)
BUN SERPL-MCNC: 19 MG/DL (ref 8–23)
CALCIUM SERPL-MCNC: 9.8 MG/DL (ref 8.7–10.5)
CHLORIDE SERPL-SCNC: 103 MMOL/L (ref 95–110)
CO2 SERPL-SCNC: 28 MMOL/L (ref 23–29)
CREAT SERPL-MCNC: 1.3 MG/DL (ref 0.5–1.4)
ERYTHROCYTE [DISTWIDTH] IN BLOOD BY AUTOMATED COUNT: 13.1 % (ref 11.5–14.5)
EST. GFR  (NO RACE VARIABLE): 55.9 ML/MIN/1.73 M^2
ESTIMATED AVG GLUCOSE: 120 MG/DL (ref 68–131)
GLUCOSE SERPL-MCNC: 123 MG/DL (ref 70–110)
HBA1C MFR BLD: 5.8 % (ref 4–5.6)
HCT VFR BLD AUTO: 51.3 % (ref 40–54)
HGB BLD-MCNC: 16.9 G/DL (ref 14–18)
MCH RBC QN AUTO: 30.5 PG (ref 27–31)
MCHC RBC AUTO-ENTMCNC: 32.9 G/DL (ref 32–36)
MCV RBC AUTO: 92 FL (ref 82–98)
PLATELET # BLD AUTO: 198 K/UL (ref 150–450)
PMV BLD AUTO: 10.3 FL (ref 9.2–12.9)
POTASSIUM SERPL-SCNC: 4.3 MMOL/L (ref 3.5–5.1)
RBC # BLD AUTO: 5.55 M/UL (ref 4.6–6.2)
SODIUM SERPL-SCNC: 139 MMOL/L (ref 136–145)
WBC # BLD AUTO: 6.77 K/UL (ref 3.9–12.7)

## 2022-08-22 PROCEDURE — 99499 NO LOS: ICD-10-PCS | Mod: S$GLB,,, | Performed by: UROLOGY

## 2022-08-22 PROCEDURE — 99499 UNLISTED E&M SERVICE: CPT | Mod: S$GLB,,, | Performed by: UROLOGY

## 2022-08-22 PROCEDURE — 83036 HEMOGLOBIN GLYCOSYLATED A1C: CPT | Performed by: ANESTHESIOLOGY

## 2022-08-22 PROCEDURE — 80048 BASIC METABOLIC PNL TOTAL CA: CPT | Performed by: ANESTHESIOLOGY

## 2022-08-22 PROCEDURE — 36415 COLL VENOUS BLD VENIPUNCTURE: CPT | Performed by: ANESTHESIOLOGY

## 2022-08-22 PROCEDURE — 85027 COMPLETE CBC AUTOMATED: CPT | Performed by: ANESTHESIOLOGY

## 2022-09-07 ENCOUNTER — TELEPHONE (OUTPATIENT)
Dept: UROLOGY | Facility: CLINIC | Age: 82
End: 2022-09-07
Payer: MEDICARE

## 2022-09-07 NOTE — TELEPHONE ENCOUNTER
Called pt to confirm arrival time of 8am for procedure on 9/8/2022. Gave pt NPO instructions and gave pt opportunity to ask questions. Pt verbalized understanding.     Pt was informed that only 1 person would be allowed to accompany them the morning of surgery.  Pt verbalized understanding.

## 2022-09-08 ENCOUNTER — ANESTHESIA (OUTPATIENT)
Dept: SURGERY | Facility: HOSPITAL | Age: 82
End: 2022-09-08
Payer: MEDICARE

## 2022-09-08 ENCOUNTER — HOSPITAL ENCOUNTER (OUTPATIENT)
Facility: HOSPITAL | Age: 82
Discharge: HOME | End: 2022-09-09
Attending: UROLOGY | Admitting: UROLOGY
Payer: MEDICARE

## 2022-09-08 ENCOUNTER — ANESTHESIA EVENT (OUTPATIENT)
Dept: SURGERY | Facility: HOSPITAL | Age: 82
End: 2022-09-08
Payer: MEDICARE

## 2022-09-08 DIAGNOSIS — T83.490A MALFUNCTION OF PENILE PROSTHESIS, INITIAL ENCOUNTER: Primary | ICD-10-CM

## 2022-09-08 DIAGNOSIS — I10 PRIMARY HYPERTENSION: ICD-10-CM

## 2022-09-08 LAB
POCT GLUCOSE: 104 MG/DL (ref 70–110)
POCT GLUCOSE: 122 MG/DL (ref 70–110)

## 2022-09-08 PROCEDURE — D9220A PRA ANESTHESIA: Mod: CRNA,,, | Performed by: NURSE ANESTHETIST, CERTIFIED REGISTERED

## 2022-09-08 PROCEDURE — 36000705 HC OR TIME LEV I EA ADD 15 MIN: Performed by: UROLOGY

## 2022-09-08 PROCEDURE — 36000704 HC OR TIME LEV I 1ST 15 MIN: Performed by: UROLOGY

## 2022-09-08 PROCEDURE — 87077 CULTURE AEROBIC IDENTIFY: CPT | Performed by: UROLOGY

## 2022-09-08 PROCEDURE — D9220A PRA ANESTHESIA: ICD-10-PCS | Mod: CRNA,,, | Performed by: NURSE ANESTHETIST, CERTIFIED REGISTERED

## 2022-09-08 PROCEDURE — 63600175 PHARM REV CODE 636 W HCPCS: Performed by: UROLOGY

## 2022-09-08 PROCEDURE — 54408 REPAIR MULTI-COMP PENIS PROS: CPT | Mod: ,,, | Performed by: UROLOGY

## 2022-09-08 PROCEDURE — 71000015 HC POSTOP RECOV 1ST HR: Performed by: UROLOGY

## 2022-09-08 PROCEDURE — 63600175 PHARM REV CODE 636 W HCPCS: Performed by: INTERNAL MEDICINE

## 2022-09-08 PROCEDURE — 63600175 PHARM REV CODE 636 W HCPCS: Performed by: STUDENT IN AN ORGANIZED HEALTH CARE EDUCATION/TRAINING PROGRAM

## 2022-09-08 PROCEDURE — 25000003 PHARM REV CODE 250: Performed by: STUDENT IN AN ORGANIZED HEALTH CARE EDUCATION/TRAINING PROGRAM

## 2022-09-08 PROCEDURE — 63600175 PHARM REV CODE 636 W HCPCS: Performed by: NURSE ANESTHETIST, CERTIFIED REGISTERED

## 2022-09-08 PROCEDURE — 82962 GLUCOSE BLOOD TEST: CPT | Performed by: UROLOGY

## 2022-09-08 PROCEDURE — 27201423 OPTIME MED/SURG SUP & DEVICES STERILE SUPPLY: Performed by: UROLOGY

## 2022-09-08 PROCEDURE — G0378 HOSPITAL OBSERVATION PER HR: HCPCS

## 2022-09-08 PROCEDURE — 87070 CULTURE OTHR SPECIMN AEROBIC: CPT | Performed by: UROLOGY

## 2022-09-08 PROCEDURE — 25000242 PHARM REV CODE 250 ALT 637 W/ HCPCS: Performed by: INTERNAL MEDICINE

## 2022-09-08 PROCEDURE — 71000033 HC RECOVERY, INTIAL HOUR: Performed by: UROLOGY

## 2022-09-08 PROCEDURE — 25000003 PHARM REV CODE 250: Performed by: NURSE ANESTHETIST, CERTIFIED REGISTERED

## 2022-09-08 PROCEDURE — 54408 PR REPAIR,INFLATABLE PENILE PROSTHESIS: ICD-10-PCS | Mod: ,,, | Performed by: UROLOGY

## 2022-09-08 PROCEDURE — 37000009 HC ANESTHESIA EA ADD 15 MINS: Performed by: UROLOGY

## 2022-09-08 PROCEDURE — 94640 AIRWAY INHALATION TREATMENT: CPT

## 2022-09-08 PROCEDURE — 87186 SC STD MICRODIL/AGAR DIL: CPT | Performed by: UROLOGY

## 2022-09-08 PROCEDURE — 87102 FUNGUS ISOLATION CULTURE: CPT | Performed by: UROLOGY

## 2022-09-08 PROCEDURE — 82962 GLUCOSE BLOOD TEST: CPT | Mod: 91 | Performed by: UROLOGY

## 2022-09-08 PROCEDURE — 71000016 HC POSTOP RECOV ADDL HR: Performed by: UROLOGY

## 2022-09-08 PROCEDURE — 87075 CULTR BACTERIA EXCEPT BLOOD: CPT | Performed by: UROLOGY

## 2022-09-08 PROCEDURE — 25000003 PHARM REV CODE 250: Performed by: UROLOGY

## 2022-09-08 PROCEDURE — D9220A PRA ANESTHESIA: Mod: ANES,,, | Performed by: INTERNAL MEDICINE

## 2022-09-08 PROCEDURE — 37000008 HC ANESTHESIA 1ST 15 MINUTES: Performed by: UROLOGY

## 2022-09-08 PROCEDURE — D9220A PRA ANESTHESIA: ICD-10-PCS | Mod: ANES,,, | Performed by: INTERNAL MEDICINE

## 2022-09-08 PROCEDURE — 94761 N-INVAS EAR/PLS OXIMETRY MLT: CPT

## 2022-09-08 RX ORDER — ONDANSETRON 2 MG/ML
4 INJECTION INTRAMUSCULAR; INTRAVENOUS DAILY PRN
Status: DISCONTINUED | OUTPATIENT
Start: 2022-09-08 | End: 2022-09-08 | Stop reason: HOSPADM

## 2022-09-08 RX ORDER — FENTANYL CITRATE 50 UG/ML
INJECTION, SOLUTION INTRAMUSCULAR; INTRAVENOUS
Status: DISCONTINUED | OUTPATIENT
Start: 2022-09-08 | End: 2022-09-08

## 2022-09-08 RX ORDER — SODIUM CHLORIDE 0.9 % (FLUSH) 0.9 %
10 SYRINGE (ML) INJECTION
Status: DISCONTINUED | OUTPATIENT
Start: 2022-09-08 | End: 2022-09-08 | Stop reason: HOSPADM

## 2022-09-08 RX ORDER — IPRATROPIUM BROMIDE AND ALBUTEROL SULFATE 2.5; .5 MG/3ML; MG/3ML
3 SOLUTION RESPIRATORY (INHALATION) EVERY 4 HOURS PRN
Status: DISCONTINUED | OUTPATIENT
Start: 2022-09-08 | End: 2022-09-09 | Stop reason: HOSPADM

## 2022-09-08 RX ORDER — PANTOPRAZOLE SODIUM 40 MG/1
40 TABLET, DELAYED RELEASE ORAL DAILY
Status: DISCONTINUED | OUTPATIENT
Start: 2022-09-08 | End: 2022-09-09 | Stop reason: HOSPADM

## 2022-09-08 RX ORDER — POLYETHYLENE GLYCOL 3350 17 G/17G
17 POWDER, FOR SOLUTION ORAL DAILY
Status: DISCONTINUED | OUTPATIENT
Start: 2022-09-08 | End: 2022-09-09 | Stop reason: HOSPADM

## 2022-09-08 RX ORDER — DEXAMETHASONE SODIUM PHOSPHATE 4 MG/ML
INJECTION, SOLUTION INTRA-ARTICULAR; INTRALESIONAL; INTRAMUSCULAR; INTRAVENOUS; SOFT TISSUE
Status: DISCONTINUED | OUTPATIENT
Start: 2022-09-08 | End: 2022-09-08

## 2022-09-08 RX ORDER — PROPOFOL 10 MG/ML
VIAL (ML) INTRAVENOUS
Status: DISCONTINUED | OUTPATIENT
Start: 2022-09-08 | End: 2022-09-08

## 2022-09-08 RX ORDER — SODIUM CHLORIDE, SODIUM LACTATE, POTASSIUM CHLORIDE, CALCIUM CHLORIDE 600; 310; 30; 20 MG/100ML; MG/100ML; MG/100ML; MG/100ML
INJECTION, SOLUTION INTRAVENOUS CONTINUOUS
Status: ACTIVE | OUTPATIENT
Start: 2022-09-08 | End: 2022-09-08

## 2022-09-08 RX ORDER — HYDROMORPHONE HYDROCHLORIDE 1 MG/ML
0.2 INJECTION, SOLUTION INTRAMUSCULAR; INTRAVENOUS; SUBCUTANEOUS EVERY 5 MIN PRN
Status: DISCONTINUED | OUTPATIENT
Start: 2022-09-08 | End: 2022-09-08 | Stop reason: HOSPADM

## 2022-09-08 RX ORDER — SODIUM CHLORIDE 9 MG/ML
INJECTION, SOLUTION INTRAVENOUS ONCE
Status: COMPLETED | OUTPATIENT
Start: 2022-09-08 | End: 2022-09-09

## 2022-09-08 RX ORDER — OXYCODONE HYDROCHLORIDE 10 MG/1
10 TABLET ORAL EVERY 4 HOURS PRN
Status: DISCONTINUED | OUTPATIENT
Start: 2022-09-08 | End: 2022-09-09 | Stop reason: HOSPADM

## 2022-09-08 RX ORDER — TALC
6 POWDER (GRAM) TOPICAL NIGHTLY PRN
Status: DISCONTINUED | OUTPATIENT
Start: 2022-09-08 | End: 2022-09-09 | Stop reason: HOSPADM

## 2022-09-08 RX ORDER — ACETAMINOPHEN 325 MG/1
650 TABLET ORAL EVERY 6 HOURS
Status: DISCONTINUED | OUTPATIENT
Start: 2022-09-08 | End: 2022-09-09 | Stop reason: HOSPADM

## 2022-09-08 RX ORDER — NEOSTIGMINE METHYLSULFATE 0.5 MG/ML
INJECTION, SOLUTION INTRAVENOUS
Status: DISCONTINUED | OUTPATIENT
Start: 2022-09-08 | End: 2022-09-08

## 2022-09-08 RX ORDER — LIDOCAINE HYDROCHLORIDE 10 MG/ML
1 INJECTION, SOLUTION EPIDURAL; INFILTRATION; INTRACAUDAL; PERINEURAL ONCE
Status: COMPLETED | OUTPATIENT
Start: 2022-09-08 | End: 2022-09-08

## 2022-09-08 RX ORDER — ROCURONIUM BROMIDE 10 MG/ML
INJECTION, SOLUTION INTRAVENOUS
Status: DISCONTINUED | OUTPATIENT
Start: 2022-09-08 | End: 2022-09-08

## 2022-09-08 RX ORDER — LIDOCAINE HYDROCHLORIDE 20 MG/ML
INJECTION, SOLUTION EPIDURAL; INFILTRATION; INTRACAUDAL; PERINEURAL
Status: DISCONTINUED | OUTPATIENT
Start: 2022-09-08 | End: 2022-09-08

## 2022-09-08 RX ORDER — PROCHLORPERAZINE EDISYLATE 5 MG/ML
5 INJECTION INTRAMUSCULAR; INTRAVENOUS EVERY 6 HOURS PRN
Status: DISCONTINUED | OUTPATIENT
Start: 2022-09-08 | End: 2022-09-09 | Stop reason: HOSPADM

## 2022-09-08 RX ORDER — SUCRALFATE 1 G/10ML
1 SUSPENSION ORAL EVERY 6 HOURS
Status: DISCONTINUED | OUTPATIENT
Start: 2022-09-08 | End: 2022-09-09 | Stop reason: HOSPADM

## 2022-09-08 RX ORDER — MAG HYDROX/ALUMINUM HYD/SIMETH 200-200-20
30 SUSPENSION, ORAL (FINAL DOSE FORM) ORAL
Status: DISCONTINUED | OUTPATIENT
Start: 2022-09-08 | End: 2022-09-09 | Stop reason: HOSPADM

## 2022-09-08 RX ORDER — HYDROMORPHONE HYDROCHLORIDE 1 MG/ML
0.5 INJECTION, SOLUTION INTRAMUSCULAR; INTRAVENOUS; SUBCUTANEOUS EVERY 6 HOURS PRN
Status: DISCONTINUED | OUTPATIENT
Start: 2022-09-08 | End: 2022-09-09 | Stop reason: HOSPADM

## 2022-09-08 RX ORDER — FLUTICASONE PROPIONATE 50 MCG
2 SPRAY, SUSPENSION (ML) NASAL DAILY
Status: DISCONTINUED | OUTPATIENT
Start: 2022-09-09 | End: 2022-09-09 | Stop reason: HOSPADM

## 2022-09-08 RX ORDER — PHENYLEPHRINE HYDROCHLORIDE 10 MG/ML
INJECTION INTRAVENOUS
Status: DISCONTINUED | OUTPATIENT
Start: 2022-09-08 | End: 2022-09-08

## 2022-09-08 RX ORDER — LOSARTAN POTASSIUM 50 MG/1
50 TABLET ORAL EVERY MORNING
Status: DISCONTINUED | OUTPATIENT
Start: 2022-09-08 | End: 2022-09-09 | Stop reason: HOSPADM

## 2022-09-08 RX ORDER — OXYCODONE HYDROCHLORIDE 5 MG/1
5 TABLET ORAL
Status: DISCONTINUED | OUTPATIENT
Start: 2022-09-08 | End: 2022-09-08 | Stop reason: HOSPADM

## 2022-09-08 RX ORDER — ONDANSETRON 2 MG/ML
4 INJECTION INTRAMUSCULAR; INTRAVENOUS EVERY 6 HOURS PRN
Status: DISCONTINUED | OUTPATIENT
Start: 2022-09-08 | End: 2022-09-09 | Stop reason: HOSPADM

## 2022-09-08 RX ORDER — ATORVASTATIN CALCIUM 20 MG/1
20 TABLET, FILM COATED ORAL DAILY
Status: DISCONTINUED | OUTPATIENT
Start: 2022-09-09 | End: 2022-09-09 | Stop reason: HOSPADM

## 2022-09-08 RX ORDER — AMLODIPINE BESYLATE 10 MG/1
10 TABLET ORAL DAILY
Status: DISCONTINUED | OUTPATIENT
Start: 2022-09-09 | End: 2022-09-09 | Stop reason: HOSPADM

## 2022-09-08 RX ORDER — ONDANSETRON 2 MG/ML
INJECTION INTRAMUSCULAR; INTRAVENOUS
Status: DISCONTINUED | OUTPATIENT
Start: 2022-09-08 | End: 2022-09-08

## 2022-09-08 RX ORDER — CETIRIZINE HYDROCHLORIDE 10 MG/1
10 TABLET ORAL DAILY
Status: DISCONTINUED | OUTPATIENT
Start: 2022-09-08 | End: 2022-09-09 | Stop reason: HOSPADM

## 2022-09-08 RX ORDER — OXYCODONE HYDROCHLORIDE 5 MG/1
5 TABLET ORAL EVERY 4 HOURS PRN
Status: DISCONTINUED | OUTPATIENT
Start: 2022-09-08 | End: 2022-09-09 | Stop reason: HOSPADM

## 2022-09-08 RX ORDER — MAG HYDROX/ALUMINUM HYD/SIMETH 200-200-20
30 SUSPENSION, ORAL (FINAL DOSE FORM) ORAL
Status: DISCONTINUED | OUTPATIENT
Start: 2022-09-08 | End: 2022-09-08

## 2022-09-08 RX ORDER — VANCOMYCIN HYDROCHLORIDE 1 G/20ML
INJECTION, POWDER, LYOPHILIZED, FOR SOLUTION INTRAVENOUS
Status: DISCONTINUED | OUTPATIENT
Start: 2022-09-08 | End: 2022-09-08 | Stop reason: HOSPADM

## 2022-09-08 RX ORDER — GENTAMICIN SULFATE 40 MG/ML
INJECTION, SOLUTION INTRAMUSCULAR; INTRAVENOUS
Status: DISCONTINUED | OUTPATIENT
Start: 2022-09-08 | End: 2022-09-08 | Stop reason: HOSPADM

## 2022-09-08 RX ORDER — SUCRALFATE 1 G/10ML
1 SUSPENSION ORAL EVERY 6 HOURS
Status: DISCONTINUED | OUTPATIENT
Start: 2022-09-08 | End: 2022-09-08

## 2022-09-08 RX ORDER — SODIUM CHLORIDE 0.9 % (FLUSH) 0.9 %
10 SYRINGE (ML) INJECTION
Status: DISCONTINUED | OUTPATIENT
Start: 2022-09-08 | End: 2022-09-09 | Stop reason: HOSPADM

## 2022-09-08 RX ORDER — OXYCODONE HYDROCHLORIDE 5 MG/1
10 TABLET ORAL EVERY 4 HOURS PRN
Status: DISCONTINUED | OUTPATIENT
Start: 2022-09-08 | End: 2022-09-08 | Stop reason: HOSPADM

## 2022-09-08 RX ADMIN — ACETAMINOPHEN 650 MG: 325 TABLET ORAL at 05:09

## 2022-09-08 RX ADMIN — VANCOMYCIN HYDROCHLORIDE 1250 MG: 1.25 INJECTION, POWDER, LYOPHILIZED, FOR SOLUTION INTRAVENOUS at 08:09

## 2022-09-08 RX ADMIN — PANTOPRAZOLE SODIUM 40 MG: 40 TABLET, DELAYED RELEASE ORAL at 01:09

## 2022-09-08 RX ADMIN — HYDROMORPHONE HYDROCHLORIDE 0.2 MG: 1 INJECTION, SOLUTION INTRAMUSCULAR; INTRAVENOUS; SUBCUTANEOUS at 01:09

## 2022-09-08 RX ADMIN — FENTANYL CITRATE 50 MCG: 50 INJECTION, SOLUTION INTRAMUSCULAR; INTRAVENOUS at 12:09

## 2022-09-08 RX ADMIN — PROPOFOL 150 MG: 10 INJECTION, EMULSION INTRAVENOUS at 11:09

## 2022-09-08 RX ADMIN — OXYCODONE HYDROCHLORIDE 10 MG: 10 TABLET ORAL at 09:09

## 2022-09-08 RX ADMIN — OXYCODONE 5 MG: 5 TABLET ORAL at 01:09

## 2022-09-08 RX ADMIN — POLYETHYLENE GLYCOL 3350 17 G: 17 POWDER, FOR SOLUTION ORAL at 01:09

## 2022-09-08 RX ADMIN — FENTANYL CITRATE 50 MCG: 50 INJECTION, SOLUTION INTRAMUSCULAR; INTRAVENOUS at 11:09

## 2022-09-08 RX ADMIN — SUCRALFATE 1 G: 1 SUSPENSION ORAL at 05:09

## 2022-09-08 RX ADMIN — ACETAMINOPHEN 650 MG: 325 TABLET ORAL at 11:09

## 2022-09-08 RX ADMIN — NEOSTIGMINE METHYLSULFATE 4 MG: 0.5 INJECTION INTRAVENOUS at 12:09

## 2022-09-08 RX ADMIN — GLYCOPYRROLATE 0.6 MG: 0.2 INJECTION INTRAMUSCULAR; INTRAVENOUS at 12:09

## 2022-09-08 RX ADMIN — ALUMINUM HYDROXIDE, MAGNESIUM HYDROXIDE, AND SIMETHICONE 30 ML: 200; 200; 20 SUSPENSION ORAL at 05:09

## 2022-09-08 RX ADMIN — DEXAMETHASONE SODIUM PHOSPHATE 4 MG: 4 INJECTION INTRA-ARTICULAR; INTRALESIONAL; INTRAMUSCULAR; INTRAVENOUS; SOFT TISSUE at 11:09

## 2022-09-08 RX ADMIN — ROCURONIUM BROMIDE 50 MG: 10 INJECTION, SOLUTION INTRAVENOUS at 11:09

## 2022-09-08 RX ADMIN — GLYCOPYRROLATE 0.2 MG: 0.2 INJECTION INTRAMUSCULAR; INTRAVENOUS at 11:09

## 2022-09-08 RX ADMIN — PHENYLEPHRINE HYDROCHLORIDE 100 MCG: 10 INJECTION INTRAVENOUS at 11:09

## 2022-09-08 RX ADMIN — SUCRALFATE 1 G: 1 SUSPENSION ORAL at 11:09

## 2022-09-08 RX ADMIN — ONDANSETRON 4 MG: 2 INJECTION INTRAMUSCULAR; INTRAVENOUS at 12:09

## 2022-09-08 RX ADMIN — LIDOCAINE HYDROCHLORIDE 100 MG: 20 INJECTION, SOLUTION EPIDURAL; INFILTRATION; INTRACAUDAL; PERINEURAL at 11:09

## 2022-09-08 RX ADMIN — SODIUM CHLORIDE: 9 INJECTION, SOLUTION INTRAVENOUS at 11:09

## 2022-09-08 RX ADMIN — IPRATROPIUM BROMIDE AND ALBUTEROL SULFATE 3 ML: 2.5; .5 SOLUTION RESPIRATORY (INHALATION) at 01:09

## 2022-09-08 RX ADMIN — CETIRIZINE HYDROCHLORIDE 10 MG: 10 TABLET, FILM COATED ORAL at 01:09

## 2022-09-08 RX ADMIN — GENTAMICIN SULFATE 400 MG: 40 INJECTION, SOLUTION INTRAMUSCULAR; INTRAVENOUS at 09:09

## 2022-09-08 RX ADMIN — LOSARTAN POTASSIUM 50 MG: 50 TABLET, FILM COATED ORAL at 01:09

## 2022-09-08 RX ADMIN — SODIUM CHLORIDE: 0.9 INJECTION, SOLUTION INTRAVENOUS at 08:09

## 2022-09-08 RX ADMIN — OXYCODONE HYDROCHLORIDE 10 MG: 10 TABLET ORAL at 05:09

## 2022-09-08 RX ADMIN — SODIUM CHLORIDE, SODIUM LACTATE, POTASSIUM CHLORIDE, AND CALCIUM CHLORIDE: .6; .31; .03; .02 INJECTION, SOLUTION INTRAVENOUS at 01:09

## 2022-09-08 RX ADMIN — LIDOCAINE HYDROCHLORIDE 1 MG: 10 INJECTION, SOLUTION EPIDURAL; INFILTRATION; INTRACAUDAL; PERINEURAL at 08:09

## 2022-09-08 RX ADMIN — ALUMINUM HYDROXIDE, MAGNESIUM HYDROXIDE, AND SIMETHICONE 30 ML: 200; 200; 20 SUSPENSION ORAL at 09:09

## 2022-09-08 NOTE — ANESTHESIA POSTPROCEDURE EVALUATION
Anesthesia Post Evaluation    Patient: Carolann R Waite    Procedure(s) Performed: Procedure(s) (LRB):  REPAIR IPP (N/A)  WASHOUT (N/A)    Final Anesthesia Type: general      Patient location during evaluation: PACU  Patient participation: Yes- Able to Participate  Level of consciousness: awake and alert  Post-procedure vital signs: reviewed and stable  Pain management: adequate  Airway patency: patent    PONV status at discharge: No PONV  Anesthetic complications: no      Cardiovascular status: blood pressure returned to baseline  Respiratory status: unassisted and spontaneous ventilation  Hydration status: euvolemic  Follow-up not needed.          Vitals Value Taken Time   /68 09/08/22 1332   Temp 36.4 °C (97.5 °F) 09/08/22 1250   Pulse 63 09/08/22 1335   Resp 11 09/08/22 1335   SpO2 96 % 09/08/22 1335   Vitals shown include unvalidated device data.      Event Time   Out of Recovery 13:15:00         Pain/Prudence Score: Pain Rating Prior to Med Admin: 5 (9/8/2022  1:25 PM)  Pain Rating Post Med Admin: 4 (9/8/2022  1:30 PM)  Prudence Score: 10 (9/8/2022  1:15 PM)

## 2022-09-08 NOTE — ANESTHESIA PREPROCEDURE EVALUATION
09/08/2022  Pre-operative evaluation for Procedure(s) (LRB):  REPAIR IPP (N/A)  REPLACEMENT, PENILE PROSTHESIS, INFLATABLE (N/A)    Carolann Waite is a 82 y.o. male w a pmhx of CAD s/p CABG x 4 (3/20/2022), HTN, HLD, and Type 2 DM. Pt was seen in the cardiology clinic 5/2022, recovering well. Pt was found to be bradycardic during this visit and her metoprolol was d/c'd.     Patient Active Problem List   Diagnosis    HTN (hypertension)    Hyperlipidemia LDL goal <70    Type 2 diabetes mellitus without complication, without long-term current use of insulin    Gout    GERD (gastroesophageal reflux disease)    Cervical radiculopathy    Abdominal pain    Peyronie's disease    Erectile dysfunction due to arterial insufficiency    Coronary artery disease due to calcified coronary lesion    S/P CABG x 4       Review of patient's allergies indicates:   Allergen Reactions    Hydrocodone Itching       No current facility-administered medications on file prior to encounter.     Current Outpatient Medications on File Prior to Encounter   Medication Sig Dispense Refill    amLODIPine (NORVASC) 10 MG tablet TAKE 1 TABLET EVERY DAY 90 tablet 0    atorvastatin (LIPITOR) 20 MG tablet TAKE 1 TABLET EVERY DAY 90 tablet 2    pantoprazole (PROTONIX) 40 MG tablet TAKE 1 TABLET EVERY DAY 90 tablet 0    aspirin (ECOTRIN) 81 MG EC tablet Take 81 mg by mouth once daily. Takes as a preventative      etodolac (LODINE) 200 MG Cap Take 1 capsule (200 mg total) by mouth every 8 (eight) hours as needed. To use for gout attack related pain instead of indocin. 90 capsule 1    fexofenadine (ALLEGRA) 180 MG tablet Take 180 mg by mouth daily as needed (post nasal drip).      fluticasone (FLONASE) 50 mcg/actuation nasal spray 2 sprays by Nasal route daily as needed. 1 - 2 Spray(s) Nasal daily. 16 g 2    losartan (COZAAR) 100 MG  tablet Take 50 mg by mouth every morning.      polyethylene glycol (GLYCOLAX) 17 gram/dose powder Mix 1 capful (17 g) with a liquid and take by mouth once daily. 238 g 0    RESTASIS 0.05 % ophthalmic emulsion Place 1 drop into both eyes 2 (two) times daily.      SITagliptin (JANUVIA) 100 MG Tab Take 50 mg by mouth every morning.      triamcinolone acetonide 0.1% (KENALOG) 0.1 % cream Apply topically daily as needed (itch).         Past Surgical History:   Procedure Laterality Date    cancer of skin (nose)  02/2014    basal cell ca    CIRCUMCISION, PRIMARY      COLONOSCOPY  7/31/2009  Radhames    Normal colon and TI.    COLONOSCOPY  10/08/2014    Dr. Ricci, repeat in 6-7 years    COLONOSCOPY N/A 01/18/2019    Procedure: COLONOSCOPY;  Surgeon: Ron Ricci Jr., MD;  Location: [facility] ENDO;  Service: Endoscopy;  Laterality: N/A;    COLONOSCOPY W/ POLYPECTOMY  8/7/2006  Radhames    Three cecal polyps, largest 4mm x 12mm.  TUBULAR ADENOMAS.  One 2 mm in the hepatic flexure.  TUBULAR ADENOMAS.    CORONARY ARTERY BYPASS GRAFT (CABG) N/A 3/20/2022    Procedure: CABG W/ LIMA X 4 VESSELS;  Surgeon: Catarino Banuelos MD;  Location: [facility] OR;  Service: Cardiovascular;  Laterality: N/A;    ENDOSCOPIC HARVEST OF VEIN Left 3/20/2022    Procedure: SURGICAL PROCUREMENT, VEIN, ENDOSCOPIC;  Surgeon: Catarino Bnauelos MD;  Location: [facility] OR;  Service: Cardiovascular;  Laterality: Left;    EPIDURAL STEROID INJECTION INTO CERVICAL SPINE N/A 08/15/2018    Procedure: Injection-steroid-epidural-cervical;  Surgeon: Keny Ibanez MD;  Location: [facility] OR;  Service: Pain Management;  Laterality: N/A;  to left    FLEXIBLE SIGMOIDOSCOPY  ~[address] [place]    INSERTION OF INFLATABLE PENILE PROSTHESIS N/A 12/14/2021    Procedure: INSERTION, PENILE PROSTHESIS, INFLATABLE;  Surgeon: Juventino Rodriguez MD;  Location: [address];  Service: Urology;  Laterality: N/A;  1.5hr    LEFT HEART CATHETERIZATION N/A 3/15/2022    Procedure:  Left heart cath;  Surgeon: Kulwinder Francis III, MD;  Location: [facility] CATH;  Service: Cardiology;  Laterality: N/A;    RECONSTRUCTION OF PENIS N/A 2021    Procedure: RECONSTRUCTION, PENIS remodeling;  Surgeon: Juventino Rodriguez MD;  Location: [facility] OR [address] [place] [address];  Service: Urology;  Laterality: N/A;  1.5hr    removal of lipoma      R arm    removal of melanoma      L arm    TONSILLECTOMY      UPPER GASTROINTESTINAL ENDOSCOPY  in his 30's    normal findings per patient report       Social History     Socioeconomic History    Marital status:    Tobacco Use    Smoking status: Former     Types: Cigars     Quit date: 3/20/2022     Years since quittin.4    Smokeless tobacco: Never    Tobacco comments:     smokes a cigar on occasion   Substance and Sexual Activity    Alcohol use: Not Currently     Comment: 1 drink per month    Drug use: No    Sexual activity: Yes     Partners: Female         CBC: No results for input(s): WBC, RBC, HGB, HCT, PLT, MCV, MCH, MCHC in the last 72 hours.    CMP: No results for input(s): NA, K, CL, CO2, BUN, CREATININE, GLU, MG, PHOS, CALCIUM, ALBUMIN, PROT, ALKPHOS, ALT, AST, BILITOT in the last 72 hours.    INR  No results for input(s): PT, INR, PROTIME, APTT in the last 72 hours.        Diagnostic Studies:      EKD Echo:  Results for orders placed or performed in visit on 18   2D echo with color flow doppler   Result Value Ref Range    EF + QEF 55 55 - 65    Diastolic Dysfunction No     Est. PA Systolic Pressure 14.44          Pre-op Assessment    I have reviewed the Patient Summary Reports.    I have reviewed the NPO Status.      Review of Systems  Anesthesia Hx:  No problems with previous Anesthesia    Cardiovascular:   Exercise tolerance: good Hypertension CAD      Hepatic/GI:   GERD    Neurological:  Neurology Normal    Endocrine:   Diabetes        Physical Exam  General: Well nourished, Cooperative, Alert and Oriented    Airway:  Mallampati: II /  I  Mouth Opening: Normal  TM Distance: Normal  Neck ROM: Normal ROM    Dental:  Intact    Chest/Lungs:  Clear to auscultation, Normal Respiratory Rate    Heart:  Rate: Normal  Rhythm: Regular Rhythm        Anesthesia Plan  Type of Anesthesia, risks & benefits discussed:    Anesthesia Type: Gen ETT  Intra-op Monitoring Plan: Standard ASA Monitors  Post Op Pain Control Plan: multimodal analgesia  Induction:  IV  Airway Plan: Direct  ASA Score: 3  Day of Surgery Review of History & Physical: H&P Update referred to the surgeon/provider.    Ready For Surgery From Anesthesia Perspective.     .

## 2022-09-08 NOTE — ANESTHESIA RELEASE NOTE
"Anesthesia Release from PACU Note    Patient: Carolann R Waite    Procedure(s) Performed: Procedure(s) (LRB):  REPAIR IPP (N/A)  WASHOUT (N/A)    Anesthesia type: general    Post pain: Adequate analgesia    Post assessment: no apparent anesthetic complications; required duoneb in PACU x 1 for wheezing     Last Vitals:   Visit Vitals  BP (!) 145/68 (BP Location: Right arm, Patient Position: Lying)   Pulse 64   Temp 36.4 °C (97.5 °F) (Temporal)   Resp 12   Ht 6' 2" (1.88 m)   Wt 74.8 kg (165 lb)   SpO2 96%   BMI 21.18 kg/m²       Post vital signs: stable    Level of consciousness: awake, alert  and oriented    Nausea/Vomiting: no nausea/no vomiting    Complications: none    Airway Patency: patent    Respiratory: unassisted    Cardiovascular: stable and blood pressure at baseline    Hydration: euvolemic  "

## 2022-09-08 NOTE — ANESTHESIA PROCEDURE NOTES
Intubation    Date/Time: 9/8/2022 11:13 AM  Performed by: Kulwinder Harris CRNA  Authorized by: Jesica Carey MD     Intubation:     Induction:  Intravenous    Intubated:  Postinduction    Mask Ventilation:  Easy with oral airway    Attempts:  1    Attempted By:  CRNA    Method of Intubation:  Direct    Blade:  Ralph 2    Laryngeal View Grade: Grade I - full view of cords      Difficult Airway Encountered?: No      Complications:  None    Airway Device:  Oral endotracheal tube    Airway Device Size:  8.0    Style/Cuff Inflation:  Cuffed (inflated to minimal occlusive pressure)    Inflation Amount (mL):  7    Tube secured:  22    Secured at:  The lips    Placement Verified By:  Capnometry    Complicating Factors:  None    Findings Post-Intubation:  BS equal bilateral and atraumatic/condition of teeth unchanged

## 2022-09-08 NOTE — TRANSFER OF CARE
"Anesthesia Transfer of Care Note    Patient: Carolann R Waite    Procedure(s) Performed: Procedure(s) (LRB):  REPAIR IPP (N/A)  WASHOUT (N/A)    Patient location: PACU    Anesthesia Type: general    Transport from OR: Transported from OR on 6-10 L/min O2 by face mask with adequate spontaneous ventilation    Post pain: adequate analgesia    Post assessment: no apparent anesthetic complications    Post vital signs: stable    Level of consciousness: awake and alert    Nausea/Vomiting: no nausea/vomiting    Complications: other (see comments), c/o respiratory difficulty.  100% SpO2.      Transfer of care protocol was followed      Last vitals:   Visit Vitals  BP (!) 155/73 (BP Location: Right arm, Patient Position: Lying)   Pulse 70   Temp 36.8 °C (98.2 °F)   Resp 16   Ht 6' 2" (1.88 m)   Wt 74.8 kg (165 lb)   SpO2 98%   BMI 21.18 kg/m²     "

## 2022-09-08 NOTE — OP NOTE
[facility]  Operative Note    Date: 09/08/2022    Pre-Op Diagnosis: Malfunction of penile prosthesis  Active Problem List with Overview Notes    Diagnosis Date Noted    Malfunction of penile prosthesis 09/08/2022    S/P CABG x 4     Coronary artery disease due to calcified coronary lesion     Peyronie's disease 11/08/2021    Erectile dysfunction due to arterial insufficiency 11/08/2021    Abdominal pain 01/18/2019    Cervical radiculopathy 03/27/2018    GERD (gastroesophageal reflux disease) 06/24/2014    Gout 12/02/2013    Type 2 diabetes mellitus without complication, without long-term current use of insulin 11/21/2013    HTN (hypertension)     Hyperlipidemia LDL goal <70       Post-Op Diagnosis: same    Procedure(s) Performed:   1. Repair of 3-piece inflatable penile prosthesis  2. Irrigation of wound     Specimen(s): None    Staff Surgeon: Juventino Rodriguez MD    Assistant Surgeon: Cameron Simpson MD    Anesthesia: General endotracheal anesthesia    Indications: Carolann Waite is a 82 y.o. male with erectile dysfunction due to arterial insufficiency. He is s/p placement of a 3-piece IPP in December 2021 but presents today due to malposition of the pump. He presents today for revision vs explantation/implantation of a new device.    Findings:  - Pump located along penoscrotal junction prior to incision  - Pump relocated to dependent portion of scrotum via pursestring suture  - Navdeep washout of pump site  - Device cycled and noted to be working properly. Reservoir and cylinders left alone due to proper positioning and function    Estimated Blood Loss: minimal    Drains: None    Procedure in detail:  After the risks and benefits of the procedure were explained consent was obtained. The patient was taken to the operating room and placed under general anesthesia. Pre-operative antibiotics were administered. He was placed in a supine position. His genitals were shaved. He was then prepped with betadine  and chloraprep and draped in standard fashion.     A 16 Mexican mckeon catheter was placed.     His previous penoscrotal scar was identified. An incision was made over this and bovie cautery was used to dissect down to the pump site. A Rafiq retractor was then placed for exposure. The pump was readily identified just inferior to the penoscrotal incision. The capsule surrounding the pump was entered via electrocautery. The pump was delivered from the prior pocket and was noted to be intact with intact tubing. The device was cycled and noted to be working properly, so the decision to reposition the pump without explantation of the whole device was made.    A Navdeep washout was then performed using antibiotic irrigation with vancomycin and gentamicin followed by betadine solution and then hydrogen peroxide. The pocket and wound were then copiously cleaned with a pulsavac, and the irrigation was repeated this time in the reverse order.    A new subdartos pocket was developed in the dependent portion of the scrotum. The pump was placed into the pocket and secured with a pursetring using a 2-0 vicryl. The device was again cycled to ensure that it functioned properly.    The dartos was then closed using 2-0 vicryl in three separate layers making sure to avoid the device and tubing. The skin was closed with 3-0 chromic in a horizontal mattress fashion. Dermabond, xeroform, kerlix and coban were applied completing a mummy wrap. Fluffs and mesh underwear were applied.    The patient tolerated the procedure well and was transferred to PACU in good condition.    Dispo: The patient will be admitted overnight for monitoring and for IV antibiotics.    Cameron Simpson MD

## 2022-09-08 NOTE — NURSING TRANSFER
Nursing Transfer Note      9/8/2022     Reason patient is being transferred: to room post surgical recovery    Transfer to 543    Transfer via stretcher    Transfer with personal belongings    Transported by PCT    Medicines sent: IVF infusing per pump    Any special needs or follow-up needed: N/A    Chart send with patient: Yes    Notified: Son and Wife    Patient reassessed at: 9/8/22 @ 1350    Upon arrival to floor: report given to Margi charge nurse on POSS

## 2022-09-09 ENCOUNTER — TELEPHONE (OUTPATIENT)
Dept: UROLOGY | Facility: CLINIC | Age: 82
End: 2022-09-09
Payer: MEDICARE

## 2022-09-09 VITALS
DIASTOLIC BLOOD PRESSURE: 67 MMHG | SYSTOLIC BLOOD PRESSURE: 123 MMHG | BODY MASS INDEX: 21.17 KG/M2 | HEIGHT: 74 IN | TEMPERATURE: 97 F | RESPIRATION RATE: 17 BRPM | WEIGHT: 165 LBS | OXYGEN SATURATION: 95 % | HEART RATE: 54 BPM

## 2022-09-09 PROCEDURE — 25000003 PHARM REV CODE 250: Performed by: STUDENT IN AN ORGANIZED HEALTH CARE EDUCATION/TRAINING PROGRAM

## 2022-09-09 PROCEDURE — 25000242 PHARM REV CODE 250 ALT 637 W/ HCPCS: Performed by: STUDENT IN AN ORGANIZED HEALTH CARE EDUCATION/TRAINING PROGRAM

## 2022-09-09 PROCEDURE — G0378 HOSPITAL OBSERVATION PER HR: HCPCS

## 2022-09-09 PROCEDURE — 63600175 PHARM REV CODE 636 W HCPCS: Performed by: STUDENT IN AN ORGANIZED HEALTH CARE EDUCATION/TRAINING PROGRAM

## 2022-09-09 RX ORDER — POLYETHYLENE GLYCOL 3350 17 G/17G
17 POWDER, FOR SOLUTION ORAL DAILY
Qty: 510 G | Refills: 0 | Status: SHIPPED | OUTPATIENT
Start: 2022-09-09 | End: 2022-10-09

## 2022-09-09 RX ORDER — OXYCODONE AND ACETAMINOPHEN 5; 325 MG/1; MG/1
1 TABLET ORAL EVERY 4 HOURS PRN
Qty: 10 EACH | Refills: 0 | Status: SHIPPED | OUTPATIENT
Start: 2022-09-09 | End: 2022-10-24

## 2022-09-09 RX ORDER — TAMSULOSIN HYDROCHLORIDE 0.4 MG/1
0.4 CAPSULE ORAL DAILY
Qty: 30 CAPSULE | Refills: 11 | Status: SHIPPED | OUTPATIENT
Start: 2022-09-09 | End: 2023-01-11

## 2022-09-09 RX ORDER — MUPIROCIN 20 MG/G
OINTMENT TOPICAL 3 TIMES DAILY
Qty: 1 EACH | Refills: 0 | Status: SHIPPED | OUTPATIENT
Start: 2022-09-09 | End: 2022-09-12

## 2022-09-09 RX ORDER — DICLOXACILLIN SODIUM 250 MG/1
500 CAPSULE ORAL 4 TIMES DAILY
Qty: 240 CAPSULE | Refills: 0 | Status: SHIPPED | OUTPATIENT
Start: 2022-09-09 | End: 2022-10-09

## 2022-09-09 RX ADMIN — VANCOMYCIN HYDROCHLORIDE 1250 MG: 1.25 INJECTION, POWDER, LYOPHILIZED, FOR SOLUTION INTRAVENOUS at 03:09

## 2022-09-09 RX ADMIN — POLYETHYLENE GLYCOL 3350 17 G: 17 POWDER, FOR SOLUTION ORAL at 09:09

## 2022-09-09 RX ADMIN — PANTOPRAZOLE SODIUM 40 MG: 40 TABLET, DELAYED RELEASE ORAL at 09:09

## 2022-09-09 RX ADMIN — AMLODIPINE BESYLATE 10 MG: 10 TABLET ORAL at 09:09

## 2022-09-09 RX ADMIN — ATORVASTATIN CALCIUM 20 MG: 20 TABLET, FILM COATED ORAL at 09:09

## 2022-09-09 RX ADMIN — ACETAMINOPHEN 650 MG: 325 TABLET ORAL at 11:09

## 2022-09-09 RX ADMIN — OXYCODONE 5 MG: 5 TABLET ORAL at 03:09

## 2022-09-09 RX ADMIN — GENTAMICIN SULFATE 400 MG: 40 INJECTION, SOLUTION INTRAMUSCULAR; INTRAVENOUS at 05:09

## 2022-09-09 RX ADMIN — LOSARTAN POTASSIUM 50 MG: 50 TABLET, FILM COATED ORAL at 09:09

## 2022-09-09 RX ADMIN — ACETAMINOPHEN 650 MG: 325 TABLET ORAL at 05:09

## 2022-09-09 RX ADMIN — SUCRALFATE 1 G: 1 SUSPENSION ORAL at 05:09

## 2022-09-09 RX ADMIN — OXYCODONE HYDROCHLORIDE 10 MG: 10 TABLET ORAL at 05:09

## 2022-09-09 RX ADMIN — CETIRIZINE HYDROCHLORIDE 10 MG: 10 TABLET, FILM COATED ORAL at 09:09

## 2022-09-09 RX ADMIN — SUCRALFATE 1 G: 1 SUSPENSION ORAL at 11:09

## 2022-09-09 RX ADMIN — ALUMINUM HYDROXIDE, MAGNESIUM HYDROXIDE, AND SIMETHICONE 30 ML: 200; 200; 20 SUSPENSION ORAL at 05:09

## 2022-09-09 RX ADMIN — ALUMINUM HYDROXIDE, MAGNESIUM HYDROXIDE, AND SIMETHICONE 30 ML: 200; 200; 20 SUSPENSION ORAL at 11:09

## 2022-09-09 NOTE — DISCHARGE SUMMARY
[facility]  Urology  Discharge Summary      Patient Name: Carolann Waite  MRN: [id number]  Admission Date: 9/8/2022  Hospital Length of Stay: 0 days  Discharge Date and Time:  09/09/2022 9:23 AM  Attending Physician: Juventino Rodriguez MD   Discharging Provider: LILY TORRES MD  Primary Care Physician: Gabe Panchal MD    HPI:   Carolann Waite is a 81 y.o. male with a history of HTN, HLD, DM (last A1C 5.5) with a history of erectile dysfunction and peyronie's disease.     He had ~ 45 degrees of curve to the L and dorsally.  It was difficult to penetrate due to the curve.       He also c/o severe ED refractory to oral medication.     He is s/p IPP placement on 12/14/21. 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 He claims this had gotten worse today. This causes him left sided groin discomfort, has trouble getting comfortable. Also has some discomfort to the glans. He has never been able to cycle his device due to pain.      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 Patient is currently taking ASA, but not Plavix. He was seen by his cardiologist on 8/16/22 who cleared him for surgery.  He has T2DM, well controlled on PO medication, most recent A1c 5.5 on 2/21/2022.       No recent illness, denies cough, congestion. No rashes or ongoing infection. Denies chest pain, SOB. No prior abdominal or groin surgery.      Procedure(s) (LRB):  REPAIR IPP (N/A)  WASHOUT (N/A)     Indwelling Lines/Drains at time of discharge:   Lines/Drains/Airways       Drain  Duration                  Urethral Catheter 09/08/22 1200 Straight-tip;Latex 16 Fr. <1 day                    Hospital Course (synopsis of major diagnoses, care, treatment, and services provided during the course of the hospital stay): Patient presented to [facility] and underwent above procedure. Tolerated the  procedure well and was transferred to the floor after recovery from anesthesia. Please see the operative note for further procedure details. Vitals remained stable throughout the post operative period. Physical exam was appropriate for post operative state. The patient failed a voiding trial on POD1 and a mckeon was replaced. He will f/u outpatient next week for a VT. Diet was advanced, and the patient was able to tolerate a regular diet prior to discharge. Patient was ambulating on POD1 without issues. Patient was deemed suitable for discharge on No discharge date for patient encounter.       Goals of Care Treatment Preferences:  Code Status: Full Code      Consults: none     Significant Diagnostic Studies: none     Pending Diagnostic Studies:       None            Final Active Diagnoses:    Diagnosis Date Noted POA    PRINCIPAL PROBLEM:  Malfunction of penile prosthesis [T83.490A] 09/08/2022 Yes    S/P CABG x 4 [Z95.1]  Not Applicable    Coronary artery disease due to calcified coronary lesion [I25.10, I25.84]  Yes    Peyronie's disease [N48.6] 11/08/2021 Yes    Erectile dysfunction due to arterial insufficiency [N52.01] 11/08/2021 Yes    Cervical radiculopathy [M54.12] 03/27/2018 Yes    GERD (gastroesophageal reflux disease) [K21.9] 06/24/2014 Yes    Gout [M10.9] 12/02/2013 Yes    Type 2 diabetes mellitus without complication, without long-term current use of insulin [E11.9] 11/21/2013 Yes    HTN (hypertension) [I10]  Yes    Hyperlipidemia LDL goal <70 [E78.5]  Yes      Problems Resolved During this Admission:       Patient Active Problem List    Diagnosis Date Noted    Malfunction of penile prosthesis 09/08/2022    S/P CABG x 4     Coronary artery disease due to calcified coronary lesion     Peyronie's disease 11/08/2021    Erectile dysfunction due to arterial insufficiency 11/08/2021    Abdominal pain 01/18/2019    Cervical radiculopathy 03/27/2018    GERD (gastroesophageal reflux disease) 06/24/2014    Gout  12/02/2013    Type 2 diabetes mellitus without complication, without long-term current use of insulin 11/21/2013    HTN (hypertension)     Hyperlipidemia LDL goal <70      Discharged Condition: good    Disposition: Home or Self Care    Follow Up:   Follow-up Information       Juventino Rodriguez MD Follow up in 2 week(s).    Specialty: Urology  Why: Post-op, For wound re-check  Contact information:  [address]  [facility] [address]  [phone]                           Patient Instructions:      Diet Adult Regular     Lifting restrictions     Notify your health care provider if you experience any of the following:  temperature >100.4     Notify your health care provider if you experience any of the following:  persistent nausea and vomiting or diarrhea     Notify your health care provider if you experience any of the following:  severe uncontrolled pain     Notify your health care provider if you experience any of the following:  redness, tenderness, or signs of infection (pain, swelling, redness, odor or green/yellow discharge around incision site)     Notify your health care provider if you experience any of the following:  difficulty breathing or increased cough     Notify your health care provider if you experience any of the following:  severe persistent headache     Notify your health care provider if you experience any of the following:  worsening rash     Notify your health care provider if you experience any of the following:  persistent dizziness, light-headedness, or visual disturbances     Notify your health care provider if you experience any of the following:  increased confusion or weakness     Notify your health care provider if you experience any of the following:     Activity as tolerated     Medications:  Reconciled Home Medications:      Medication List        START taking these medications      dicloxacillin 250 MG capsule  Commonly known as: DYNAPEN  Take 2 capsules (500 mg total) by  mouth 4 (four) times daily.     oxyCODONE-acetaminophen 5-325 mg per tablet  Commonly known as: PERCOCET  Take 1 tablet by mouth every 4 (four) hours as needed for Pain.            CHANGE how you take these medications      * mupirocin 2 % ointment  Commonly known as: BACTROBAN  Apply topically 2 (two) times daily as needed (sores).  What changed: Another medication with the same name was added. Make sure you understand how and when to take each.     * mupirocin 2 % ointment  Commonly known as: BACTROBAN  Apply topically 3 (three) times daily. for 3 days  What changed: You were already taking a medication with the same name, and this prescription was added. Make sure you understand how and when to take each.     * polyethylene glycol 17 gram/dose powder  Commonly known as: GLYCOLAX  Mix 1 capful (17 g) with a liquid and take by mouth once daily.  What changed: Another medication with the same name was added. Make sure you understand how and when to take each.     * polyethylene glycol 17 gram Pwpk  Commonly known as: GLYCOLAX  Take 17 g by mouth once daily.  What changed: You were already taking a medication with the same name, and this prescription was added. Make sure you understand how and when to take each.           * This list has 4 medication(s) that are the same as other medications prescribed for you. Read the directions carefully, and ask your doctor or other care provider to review them with you.                CONTINUE taking these medications      ACCU-CHEK MILENA PLUS TEST STRP MISC  1 strip by Misc.(Non-Drug; Combo Route) route 2 (two) times a day.     acetaminophen 325 MG tablet  Commonly known as: TYLENOL  Take 2 tablets (650 mg total) by mouth every 6 (six) hours as needed for Pain.     amLODIPine 10 MG tablet  Commonly known as: NORVASC  TAKE 1 TABLET EVERY DAY     aspirin 81 MG EC tablet  Commonly known as: ECOTRIN  Take 81 mg by mouth once daily. Takes as a preventative     atorvastatin 20 MG  tablet  Commonly known as: LIPITOR  TAKE 1 TABLET EVERY DAY     etodolac 200 MG Cap  Commonly known as: LODINE  Take 1 capsule (200 mg total) by mouth every 8 (eight) hours as needed. To use for gout attack related pain instead of indocin.     fexofenadine 180 MG tablet  Commonly known as: ALLEGRA  Take 180 mg by mouth daily as needed (post nasal drip).     fluticasone propionate 50 mcg/actuation nasal spray  Commonly known as: FLONASE  2 sprays by Nasal route daily as needed. 1 - 2 Spray(s) Nasal daily.     losartan 100 MG tablet  Commonly known as: COZAAR  Take 50 mg by mouth every morning.     pantoprazole 40 MG tablet  Commonly known as: PROTONIX  TAKE 1 TABLET EVERY DAY     PROBIOTIC ORAL  Take 1 tablet by mouth every morning.     RESTASIS 0.05 % ophthalmic emulsion  Generic drug: cycloSPORINE  Place 1 drop into both eyes 2 (two) times daily.     SITagliptin 100 MG Tab  Commonly known as: JANUVIA  Take 50 mg by mouth every morning.     triamcinolone acetonide 0.1% 0.1 % cream  Commonly known as: KENALOG  Apply topically daily as needed (itch).     vitamin D 1000 units Tab  Commonly known as: VITAMIN D3  Take 1,000 Units by mouth every morning.              Time spent on the discharge of patient: 15   minutes    LILY TORRES MD  Urology  [facility] - Surgery

## 2022-09-09 NOTE — ASSESSMENT & PLAN NOTE
Carolann Waite is a 82yom s/p IPP revision 9/8/22    -Removed mckeon and dressings. VT today  -keep penis pointed upward in waistband. Ice to scrotum.  -Plan for discharge today. Home with 30 days of antibiosis

## 2022-09-09 NOTE — HPI
Carolann Waite is a 81 y.o. male with a history of HTN, HLD, DM (last A1C 5.5) with a history of erectile dysfunction and peyronie's disease.     He had ~ 45 degrees of curve to the L and dorsally.  It was difficult to penetrate due to the curve.       He also c/o severe ED refractory to oral medication.     He is s/p IPP placement on 12/14/21. 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 He claims this had gotten worse today. This causes him left sided groin discomfort, has trouble getting comfortable. Also has some discomfort to the glans. He has never been able to cycle his device due to pain.      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 Patient is currently taking ASA, but not Plavix. He was seen by his cardiologist on 8/16/22 who cleared him for surgery.  He has T2DM, well controlled on PO medication, most recent A1c 5.5 on 2/21/2022.       No recent illness, denies cough, congestion. No rashes or ongoing infection. Denies chest pain, SOB. No prior abdominal or groin surgery.

## 2022-09-09 NOTE — SUBJECTIVE & OBJECTIVE
Interval History: AFVSS. Adequate UOP. Received vanc and gent. Ambulated. Tolerating diet.     Objective:     Temp:  [97.5 °F (36.4 °C)-98.2 °F (36.8 °C)] 97.8 °F (36.6 °C)  Pulse:  [51-70] 56  Resp:  [12-18] 18  SpO2:  [93 %-100 %] 98 %  BP: (114-171)/(62-74) 126/66     Body mass index is 21.18 kg/m².           Drains       Drain  Duration                  Urethral Catheter 09/08/22 1200 Straight-tip;Latex 16 Fr. <1 day                    Physical Exam  Vitals reviewed.   Constitutional:       General: He is not in acute distress.     Appearance: He is well-developed.   HENT:      Head: Normocephalic and atraumatic.   Cardiovascular:      Rate and Rhythm: Normal rate.   Pulmonary:      Effort: Pulmonary effort is normal. No respiratory distress.      Breath sounds: No stridor.   Abdominal:      General: There is no distension.      Palpations: Abdomen is soft.      Tenderness: There is no abdominal tenderness.   Genitourinary:     Comments: Merino draining clear yellow. Penoscrotal incision CDI.   Musculoskeletal:         General: Normal range of motion.      Cervical back: Normal range of motion.   Skin:     General: Skin is warm and dry.   Neurological:      Mental Status: He is alert.   Psychiatric:         Behavior: Behavior normal.       Significant Labs:    BMP:  No results for input(s): NA, K, CL, CO2, BUN, CREATININE, LABGLOM, GLUCOSE, CALCIUM in the last 168 hours.    CBC:   No results for input(s): WBC, HGB, HCT, PLT in the last 168 hours.    All pertinent labs results from the past 24 hours have been reviewed.    Significant Imaging:  All pertinent imaging results/findings from the past 24 hours have been reviewed.

## 2022-09-09 NOTE — TELEPHONE ENCOUNTER
Call placed to patient. Name and date of birth verified. Patient informed of 2wk post-op appointment. Patient informed appointment details are noted in portal. Patient verbalized understanding.

## 2022-09-09 NOTE — DISCHARGE INSTRUCTIONS
Postop instructions for IPP    Please pull down your pump with each urination episode to ensure proper healing    Expect some bruising and swelling around scrotum for the next few days    No sex or masturbation x 6 weeks    You will be sent home with antibiotics x 30 days    Wear tight fitting underwear, jock strap or boxers.   When you are in bed or in a chair, you can elevate your scrotum.  Ice to scrotum 30 min on and 30 min off for 3-5 days post-op    Pull pump down in scrotum each time you urinate.     Ok to shower in 2 days  No baths or soaking baths  No sitting in standing water until seen by your physician and given permission    Do not restart any blood thinners for the first few days unless told by your cardiologist or your physician specifically, if you have any questions call your physician    Return to ER if:  -you have fever  -see discharge from incisions or penis  -inability to void  -severe pain not controlled with pain meds  -any redness or concern for infection of your pump    Call your physician or urology clinic with any concerns

## 2022-09-09 NOTE — TELEPHONE ENCOUNTER
----- Message from Juan Francisco Cramer MD sent at 9/9/2022  9:18 AM CDT -----  Regarding: po fu  Please schedule Carolann Waite for a 2 week PO f/u with Dr. Rodriguez for his IPP check!    Thank you,  NM

## 2022-09-09 NOTE — PROGRESS NOTES
[facility]  Urology  Progress Note    Patient Name: Carolann Waite  MRN: [id number]  Admission Date: 9/8/2022  Hospital Length of Stay: 0 days  Code Status: Full Code   Attending Provider: Juventino Rodriguez MD   Primary Care Physician: Gabe Panchal MD    Subjective:     HPI:  Carolann Waite is a 81 y.o. male with a history of HTN, HLD, DM (last A1C 5.5) with a history of erectile dysfunction and peyronie's disease.     He had ~ 45 degrees of curve to the L and dorsally.  It was difficult to penetrate due to the curve.       He also c/o severe ED refractory to oral medication.     He is s/p IPP placement on 12/14/21. 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 He claims this had gotten worse today. This causes him left sided groin discomfort, has trouble getting comfortable. Also has some discomfort to the glans. He has never been able to cycle his device due to pain.      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 Patient is currently taking ASA, but not Plavix. He was seen by his cardiologist on 8/16/22 who cleared him for surgery.  He has T2DM, well controlled on PO medication, most recent A1c 5.5 on 2/21/2022.       No recent illness, denies cough, congestion. No rashes or ongoing infection. Denies chest pain, SOB. No prior abdominal or groin surgery.      Interval History: AFVSS. Adequate UOP. Received vanc and gent. Ambulated. Tolerating diet.     Objective:     Temp:  [97.5 °F (36.4 °C)-98.2 °F (36.8 °C)] 97.8 °F (36.6 °C)  Pulse:  [51-70] 56  Resp:  [12-18] 18  SpO2:  [93 %-100 %] 98 %  BP: (114-171)/(62-74) 126/66     Body mass index is 21.18 kg/m².           Drains       Drain  Duration                  Urethral Catheter 09/08/22 1200 Straight-tip;Latex 16 Fr. <1 day                    Physical Exam  Vitals reviewed.   Constitutional:        General: He is not in acute distress.     Appearance: He is well-developed.   HENT:      Head: Normocephalic and atraumatic.   Cardiovascular:      Rate and Rhythm: Normal rate.   Pulmonary:      Effort: Pulmonary effort is normal. No respiratory distress.      Breath sounds: No stridor.   Abdominal:      General: There is no distension.      Palpations: Abdomen is soft.      Tenderness: There is no abdominal tenderness.   Genitourinary:     Comments: Mckeon draining clear yellow. Penoscrotal incision CDI.   Musculoskeletal:         General: Normal range of motion.      Cervical back: Normal range of motion.   Skin:     General: Skin is warm and dry.   Neurological:      Mental Status: He is alert.   Psychiatric:         Behavior: Behavior normal.       Significant Labs:    BMP:  No results for input(s): NA, K, CL, CO2, BUN, CREATININE, LABGLOM, GLUCOSE, CALCIUM in the last 168 hours.    CBC:   No results for input(s): WBC, HGB, HCT, PLT in the last 168 hours.    All pertinent labs results from the past 24 hours have been reviewed.    Significant Imaging:  All pertinent imaging results/findings from the past 24 hours have been reviewed.                    Assessment/Plan:     * Malfunction of penile prosthesis  Carolann Waite is a 82yom s/p IPP revision 9/8/22    -Removed mckeon and dressings. VT today  -keep penis pointed upward in waistband. Ice to scrotum.  -Plan for discharge today. Home with 30 days of antibiosis          VTE Risk Mitigation (From admission, onward)    None          LILY TORRES MD  Urology  [facility]

## 2022-09-09 NOTE — PLAN OF CARE
[facility]  Initial Discharge Assessment       Primary Care Provider: Gabe Panchal MD    Admission Diagnosis: Malfunction of penile prosthesis, initial encounter [T83.467A]  Primary hypertension [I10]    Admission Date: 9/8/2022  Expected Discharge Date: 9/9/2022    Discharge Barriers Identified: DIalysis placement issues    Payor: [employer] MANAGED MEDICARE / Plan: [employer] MEDICARE HMO / Product Type: Capitation /     Extended Emergency Contact Information  Primary Emergency Contact: Julia Waite  Address: [address] [place] [address]  Home Phone: [phone]  Relation: Spouse    Discharge Plan A: Home with family  Discharge Plan B: Home      [facility] ([facility]) - [place], [place] - [address]  [address] [address]  Phone: [phone] Fax: [phone]    [id number] - [place], [place] - [address] [facility]  [address] [id number]  Phone: [phone] Fax: [phone]      Initial Assessment (most recent)       Adult Discharge Assessment - 09/09/22 1003          Discharge Assessment    Assessment Type Discharge Planning Assessment     Confirmed/corrected address, phone number and insurance Yes     Confirmed Demographics Correct on Facesheet     Source of Information patient     Communicated GALA with patient/caregiver Yes     Lives With spouse     Do you expect to return to your current living situation? Yes     Do you have help at home or someone to help you manage your care at home? Yes     Prior to hospitilization cognitive status: Alert/Oriented     Current cognitive status: Alert/Oriented     Walking or Climbing Stairs Difficulty none     Dressing/Bathing Difficulty none     Equipment Currently Used at Home none     Readmission within 30 days? No     Patient currently being followed by outpatient case management? No     Do you currently have  service(s) that help you manage your care at home? No     Do you have prescription coverage? Yes     Coverage [employer]     Who is going to help you get home at discharge? Patient son     How do you get to doctors appointments? car, drives self;family or friend will provide     Are you on dialysis? No     Do you take coumadin? No     Discharge Plan A Home with family     Discharge Plan B Home     DME Needed Upon Discharge  none     Discharge Plan discussed with: Patient     Discharge Barriers Identified DIalysis placement issues                       SW completed discharge planning assessment with the patient at bedside. SW verified demographic information listed on the pt.'s Face sheet. Patient reports living in a single story home with his spouse (Julia), no steps. Pt's son Ivette #[phone], will provide transport upon discharge. SW to add pt's contact information to pt's chart, per pt's request.SW is following this Pt for DC planning needs. There are no identified needs at this time.    Jacey Powell LMSW  Case Management   [facility]   Ext. [phone]

## 2022-09-09 NOTE — PLAN OF CARE
Problem: Adult Inpatient Plan of Care  Goal: Plan of Care Review  Outcome: Ongoing, Progressing  Goal: Patient-Specific Goal (Individualized)  Outcome: Ongoing, Progressing  Goal: Absence of Hospital-Acquired Illness or Injury  Outcome: Ongoing, Progressing  Intervention: Identify and Manage Fall Risk  Flowsheets (Taken 9/9/2022 0343)  Safety Promotion/Fall Prevention:   assistive device/personal item within reach   Fall Risk reviewed with patient/family   high risk medications identified   side rails raised x 2  Intervention: Prevent Skin Injury  Flowsheets (Taken 9/9/2022 0343)  Body Position:   position changed independently   upper extremity elevated  Skin Protection: adhesive use limited  Intervention: Prevent and Manage VTE (Venous Thromboembolism) Risk  Flowsheets (Taken 9/9/2022 0343)  Activity Management: Ambulated -L4  VTE Prevention/Management:   remove, assess skin, and reapply sequential compression device   fluids promoted  Range of Motion: active ROM (range of motion) encouraged  Intervention: Prevent Infection  Flowsheets (Taken 9/9/2022 0343)  Infection Prevention:   environmental surveillance performed   personal protective equipment utilized   hand hygiene promoted   single patient room provided   equipment surfaces disinfected   rest/sleep promoted

## 2022-09-09 NOTE — PROGRESS NOTES
Called Urologist on-call and updated on pt not being able to void yet, since mckeon catheter removed. Also, made aware of pt bladder scan results. Verbal response to give pt more time and someone will come around to see pt.

## 2022-09-10 LAB — BACTERIA SPEC AEROBE CULT: ABNORMAL

## 2022-09-10 NOTE — PROGRESS NOTES
AVS provided and education given on f/u care and f/u appointments. Pt educated that doctor made this nurse aware, via telephone, that pt will have receive a call for f/u appt to remove mckeon on Monday or Tuesday. Pt VU. Pt to go home with mckeon catheter in, per doctor. Pt provided education on care of catheter at home and supplies sent with pt for drsg. Belongings sent with pt. Pt transported off unit via wheelchair.

## 2022-09-12 ENCOUNTER — TELEPHONE (OUTPATIENT)
Dept: UROLOGY | Facility: CLINIC | Age: 82
End: 2022-09-12
Payer: MEDICARE

## 2022-09-12 NOTE — TELEPHONE ENCOUNTER
----- Message from Joana Ramírez sent at 9/12/2022 10:23 AM CDT -----  Contact: [phone]  Pt is calling bcz he was released from Er yesterday and sent home with a catheter bcz he couldnt urinate.     Someone stated they would call him back today to advise on next steps, and he's just making sure no one forgot about him     [phone]

## 2022-09-12 NOTE — TELEPHONE ENCOUNTER
Call placed to patient. Name and date of birth verified. Patient VT scheduled. Patient informed appointment details are noted in portal. Patient verbalized understanding.

## 2022-09-13 LAB — BACTERIA SPEC ANAEROBE CULT: NORMAL

## 2022-09-16 ENCOUNTER — OFFICE VISIT (OUTPATIENT)
Dept: UROLOGY | Facility: CLINIC | Age: 82
End: 2022-09-16
Payer: MEDICARE

## 2022-09-16 VITALS
DIASTOLIC BLOOD PRESSURE: 75 MMHG | HEART RATE: 80 BPM | BODY MASS INDEX: 21.16 KG/M2 | HEIGHT: 74 IN | SYSTOLIC BLOOD PRESSURE: 147 MMHG | WEIGHT: 164.88 LBS

## 2022-09-16 DIAGNOSIS — Z96.0 S/P INSERTION OF PENILE IMPLANT: Primary | ICD-10-CM

## 2022-09-16 PROCEDURE — 1160F RVW MEDS BY RX/DR IN RCRD: CPT | Mod: CPTII,S$GLB,, | Performed by: NURSE PRACTITIONER

## 2022-09-16 PROCEDURE — 1159F PR MEDICATION LIST DOCUMENTED IN MEDICAL RECORD: ICD-10-PCS | Mod: CPTII,S$GLB,, | Performed by: NURSE PRACTITIONER

## 2022-09-16 PROCEDURE — 3077F PR MOST RECENT SYSTOLIC BLOOD PRESSURE >= 140 MM HG: ICD-10-PCS | Mod: CPTII,S$GLB,, | Performed by: NURSE PRACTITIONER

## 2022-09-16 PROCEDURE — 1126F PR PAIN SEVERITY QUANTIFIED, NO PAIN PRESENT: ICD-10-PCS | Mod: CPTII,S$GLB,, | Performed by: NURSE PRACTITIONER

## 2022-09-16 PROCEDURE — 99024 POSTOP FOLLOW-UP VISIT: CPT | Mod: S$GLB,,, | Performed by: NURSE PRACTITIONER

## 2022-09-16 PROCEDURE — 3078F PR MOST RECENT DIASTOLIC BLOOD PRESSURE < 80 MM HG: ICD-10-PCS | Mod: CPTII,S$GLB,, | Performed by: NURSE PRACTITIONER

## 2022-09-16 PROCEDURE — 99999 PR PBB SHADOW E&M-EST. PATIENT-LVL IV: ICD-10-PCS | Mod: PBBFAC,,, | Performed by: NURSE PRACTITIONER

## 2022-09-16 PROCEDURE — 99024 PR POST-OP FOLLOW-UP VISIT: ICD-10-PCS | Mod: S$GLB,,, | Performed by: NURSE PRACTITIONER

## 2022-09-16 PROCEDURE — 3078F DIAST BP <80 MM HG: CPT | Mod: CPTII,S$GLB,, | Performed by: NURSE PRACTITIONER

## 2022-09-16 PROCEDURE — 1126F AMNT PAIN NOTED NONE PRSNT: CPT | Mod: CPTII,S$GLB,, | Performed by: NURSE PRACTITIONER

## 2022-09-16 PROCEDURE — 3077F SYST BP >= 140 MM HG: CPT | Mod: CPTII,S$GLB,, | Performed by: NURSE PRACTITIONER

## 2022-09-16 PROCEDURE — 99999 PR PBB SHADOW E&M-EST. PATIENT-LVL IV: CPT | Mod: PBBFAC,,, | Performed by: NURSE PRACTITIONER

## 2022-09-16 PROCEDURE — 1160F PR REVIEW ALL MEDS BY PRESCRIBER/CLIN PHARMACIST DOCUMENTED: ICD-10-PCS | Mod: CPTII,S$GLB,, | Performed by: NURSE PRACTITIONER

## 2022-09-16 PROCEDURE — 1159F MED LIST DOCD IN RCRD: CPT | Mod: CPTII,S$GLB,, | Performed by: NURSE PRACTITIONER

## 2022-09-16 NOTE — PROGRESS NOTES
CHIEF COMPLAINT:    Mr. Waite is a 82 y.o. male presenting for   Chief Complaint   Patient presents with    Other     Voiding trial        PRESENTING ILLNESS:    Carolann Waite is a 82 y.o. male with a PMH of htn, s/p cabg x4, peyronies disease, diabetes mellitus, gerd who presents for voiding trial.    Established patient of urology department. Presents today for voiding trial post IPP repair.  Had IPP repair 9/8/22 by Dr. Rodriguez secondary malposition of pump.  Had IPP originally placed 12/20211 by Dr. Rodriguez.  Will  perform voiding trial today.         REVIEW OF SYSTEMS:    Review of Systems   Constitutional:  Negative for chills and fever.   Respiratory:  Negative for shortness of breath.    Cardiovascular:  Negative for chest pain.   Gastrointestinal:  Negative for constipation and diarrhea.   Genitourinary:  Negative for dysuria, flank pain, frequency, hematuria and urgency.   Neurological:  Negative for dizziness and weakness.     PATIENT HISTORY:    Past Medical History:   Diagnosis Date    Arthritis     Cataract     OU    Colon polyps     Diabetes     Diabetes mellitus, type 2     Diverticulosis     Gout     History of colon polyps 10/8/2014    HTN (hypertension)     Hyperlipidemia LDL goal <100     Impotence     Melanoma     Left forearm, s/p excision    Squamous cell carcinoma     Head/nose       Family History   Problem Relation Age of Onset    Diabetes Mother     Cancer Father         lymphoma    Hypertension Father     Nephrolithiasis Father     Heart disease Brother 56        mi    Cancer Brother         Prostate    Heart attack Brother     Colon cancer Neg Hx     Colon polyps Neg Hx     Crohn's disease Neg Hx     Ulcerative colitis Neg Hx     Stomach cancer Neg Hx     Esophageal cancer Neg Hx     Anesthesia problems Neg Hx        Allergies:  Hydrocodone    Medications:    Current Outpatient Medications:     acetaminophen (TYLENOL) 325 MG tablet, Take 2 tablets (650 mg total) by mouth every 6 (six)  hours as needed for Pain., Disp: , Rfl: 0    amLODIPine (NORVASC) 10 MG tablet, TAKE 1 TABLET EVERY DAY, Disp: 90 tablet, Rfl: 0    aspirin (ECOTRIN) 81 MG EC tablet, Take 81 mg by mouth once daily. Takes as a preventative, Disp: , Rfl:     atorvastatin (LIPITOR) 20 MG tablet, TAKE 1 TABLET EVERY DAY, Disp: 90 tablet, Rfl: 2    blood sugar diagnostic (ACCU-CHEK MILENA PLUS TEST STRP MISC), 1 strip by Misc.(Non-Drug; Combo Route) route 2 (two) times a day., Disp: , Rfl:     dicloxacillin (DYNAPEN) 250 MG capsule, Take 2 capsules (500 mg total) by mouth 4 (four) times daily., Disp: 240 capsule, Rfl: 0    etodolac (LODINE) 200 MG Cap, Take 1 capsule (200 mg total) by mouth every 8 (eight) hours as needed. To use for gout attack related pain instead of indocin., Disp: 90 capsule, Rfl: 1    fexofenadine (ALLEGRA) 180 MG tablet, Take 180 mg by mouth daily as needed (post nasal drip)., Disp: , Rfl:     fluticasone (FLONASE) 50 mcg/actuation nasal spray, 2 sprays by Nasal route daily as needed. 1 - 2 Spray(s) Nasal daily., Disp: 16 g, Rfl: 2    Lactobacillus acidophilus (PROBIOTIC ORAL), Take 1 tablet by mouth every morning., Disp: , Rfl:     losartan (COZAAR) 100 MG tablet, Take 50 mg by mouth every morning., Disp: , Rfl:     mupirocin (BACTROBAN) 2 % ointment, Apply topically 2 (two) times daily as needed (sores)., Disp: , Rfl:     oxyCODONE-acetaminophen (PERCOCET) 5-325 mg per tablet, Take 1 tablet by mouth every 4 (four) hours as needed for Pain., Disp: 10 each, Rfl: 0    pantoprazole (PROTONIX) 40 MG tablet, TAKE 1 TABLET EVERY DAY, Disp: 90 tablet, Rfl: 0    polyethylene glycol (GLYCOLAX) 17 gram/dose powder, Mix 1 capful (17 g) with a liquid and take by mouth once daily., Disp: 238 g, Rfl: 0    polyethylene glycol (GLYCOLAX) 17 gram/dose powder, Mix 1 capful (17 g) in liquid and drink by mouth once daily., Disp: 510 g, Rfl: 0    RESTASIS 0.05 % ophthalmic emulsion, Place 1 drop into both eyes 2 (two) times daily.,  Disp: , Rfl:     SITagliptin (JANUVIA) 100 MG Tab, Take 50 mg by mouth every morning., Disp: , Rfl:     tamsulosin (FLOMAX) 0.4 mg Cap, Take 1 capsule (0.4 mg total) by mouth once daily., Disp: 30 capsule, Rfl: 11    triamcinolone acetonide 0.1% (KENALOG) 0.1 % cream, Apply topically daily as needed (itch)., Disp: , Rfl:     vitamin D (VITAMIN D3) 1000 units Tab, Take 1,000 Units by mouth every morning., Disp: , Rfl:     PHYSICAL EXAMINATION:    Physical Exam  Vitals reviewed.   Constitutional:       Appearance: He is well-developed.   HENT:      Head: Normocephalic and atraumatic.   Eyes:      Pupils: Pupils are equal, round, and reactive to light.   Pulmonary:      Effort: Pulmonary effort is normal.   Genitourinary:     Comments: Merino to gravity  Musculoskeletal:         General: Normal range of motion.   Skin:     General: Skin is warm and dry.   Neurological:      Mental Status: He is alert and oriented to person, place, and time.   Psychiatric:         Behavior: Behavior normal.         LABS:    Lab Results   Component Value Date    PSA 2.6 08/23/2021    PSA 3.7 11/06/2017    PSA 1.6 05/17/2017    PSA 2.4 05/18/2016    PSA 1.5 05/21/2015    PSA 1.3 03/03/2014    PSA 1.00 03/01/2013    PSA 0.82 12/30/2011    PSA 0.90 12/06/2010    PSA 0.98 10/21/2009    PSADIAG 2.1 08/22/2018       IMPRESSION:  Encounter Diagnoses   Name Primary?    S/P insertion of penile implant Yes         PLAN:  Problem List Items Addressed This Visit    None  Visit Diagnoses       S/P insertion of penile implant    -  Primary            1. S/p IPP repair   Voiding trial performed by Nurse David.  200 ml of sterile water was instilled into bladder.  Merino catheter was removed. Patient urinated without difficulty.     Voiding trial passed  Patient was instructed to drink plenty of fluids today.  Instructed patient to call at 1p.m. to give an update on urine output.  I instructed patient to return to clinic or emergency department (if after  clinic hours) to have mckeon catheter put back in if unable to urinate within 5 hours of mckeon catheter removal or starts to experience bladder pressure/pain, decrease flow, straining/difficulty urinating, urinary frequency.    Patient voiced understanding.     Return to clinic for f/u with Dr. Michael Mann, NP

## 2022-09-19 ENCOUNTER — TELEPHONE (OUTPATIENT)
Dept: UROLOGY | Facility: CLINIC | Age: 82
End: 2022-09-19
Payer: MEDICARE

## 2022-09-19 NOTE — TELEPHONE ENCOUNTER
Call placed to patient. Name and date of birth verified. Patient stated the tip of his penis is raw following the removal of mckeon. Patient denies any fever or chills. Stated no open area, but raw and tender. Patient instructed to keep area clean with soap and water and apply triple antibiotic ointment as directed. Patient verbalized understanding.

## 2022-09-19 NOTE — TELEPHONE ENCOUNTER
----- Message from Marlin Vidal sent at 9/19/2022  8:10 AM CDT -----  Regarding: surgery  Contact: @ [phone]  Patient is calling with concerns with inflammation after his surgery. Patient states he had a catheter removed on Friday 9/16 and now is in pain and is experiencing pain. Please call to discuss further.

## 2022-09-22 ENCOUNTER — OFFICE VISIT (OUTPATIENT)
Dept: UROLOGY | Facility: CLINIC | Age: 82
End: 2022-09-22
Payer: MEDICARE

## 2022-09-22 VITALS
WEIGHT: 164.88 LBS | HEIGHT: 74 IN | SYSTOLIC BLOOD PRESSURE: 139 MMHG | BODY MASS INDEX: 21.16 KG/M2 | HEART RATE: 81 BPM | DIASTOLIC BLOOD PRESSURE: 74 MMHG

## 2022-09-22 DIAGNOSIS — N52.01 ERECTILE DYSFUNCTION DUE TO ARTERIAL INSUFFICIENCY: Primary | ICD-10-CM

## 2022-09-22 PROBLEM — T83.490A MALFUNCTION OF PENILE PROSTHESIS: Status: RESOLVED | Noted: 2022-09-08 | Resolved: 2022-09-22

## 2022-09-22 PROCEDURE — 1160F PR REVIEW ALL MEDS BY PRESCRIBER/CLIN PHARMACIST DOCUMENTED: ICD-10-PCS | Mod: CPTII,S$GLB,, | Performed by: UROLOGY

## 2022-09-22 PROCEDURE — 99999 PR PBB SHADOW E&M-EST. PATIENT-LVL IV: CPT | Mod: PBBFAC,,, | Performed by: UROLOGY

## 2022-09-22 PROCEDURE — 3288F PR FALLS RISK ASSESSMENT DOCUMENTED: ICD-10-PCS | Mod: CPTII,S$GLB,, | Performed by: UROLOGY

## 2022-09-22 PROCEDURE — 1159F MED LIST DOCD IN RCRD: CPT | Mod: CPTII,S$GLB,, | Performed by: UROLOGY

## 2022-09-22 PROCEDURE — 3078F PR MOST RECENT DIASTOLIC BLOOD PRESSURE < 80 MM HG: ICD-10-PCS | Mod: CPTII,S$GLB,, | Performed by: UROLOGY

## 2022-09-22 PROCEDURE — 99024 POSTOP FOLLOW-UP VISIT: CPT | Mod: S$GLB,,, | Performed by: UROLOGY

## 2022-09-22 PROCEDURE — 1101F PR PT FALLS ASSESS DOC 0-1 FALLS W/OUT INJ PAST YR: ICD-10-PCS | Mod: CPTII,S$GLB,, | Performed by: UROLOGY

## 2022-09-22 PROCEDURE — 1126F PR PAIN SEVERITY QUANTIFIED, NO PAIN PRESENT: ICD-10-PCS | Mod: CPTII,S$GLB,, | Performed by: UROLOGY

## 2022-09-22 PROCEDURE — 3075F SYST BP GE 130 - 139MM HG: CPT | Mod: CPTII,S$GLB,, | Performed by: UROLOGY

## 2022-09-22 PROCEDURE — 3288F FALL RISK ASSESSMENT DOCD: CPT | Mod: CPTII,S$GLB,, | Performed by: UROLOGY

## 2022-09-22 PROCEDURE — 99024 PR POST-OP FOLLOW-UP VISIT: ICD-10-PCS | Mod: S$GLB,,, | Performed by: UROLOGY

## 2022-09-22 PROCEDURE — 99999 PR PBB SHADOW E&M-EST. PATIENT-LVL IV: ICD-10-PCS | Mod: PBBFAC,,, | Performed by: UROLOGY

## 2022-09-22 PROCEDURE — 1101F PT FALLS ASSESS-DOCD LE1/YR: CPT | Mod: CPTII,S$GLB,, | Performed by: UROLOGY

## 2022-09-22 PROCEDURE — 1126F AMNT PAIN NOTED NONE PRSNT: CPT | Mod: CPTII,S$GLB,, | Performed by: UROLOGY

## 2022-09-22 PROCEDURE — 1160F RVW MEDS BY RX/DR IN RCRD: CPT | Mod: CPTII,S$GLB,, | Performed by: UROLOGY

## 2022-09-22 PROCEDURE — 1159F PR MEDICATION LIST DOCUMENTED IN MEDICAL RECORD: ICD-10-PCS | Mod: CPTII,S$GLB,, | Performed by: UROLOGY

## 2022-09-22 PROCEDURE — 3078F DIAST BP <80 MM HG: CPT | Mod: CPTII,S$GLB,, | Performed by: UROLOGY

## 2022-09-22 PROCEDURE — 3075F PR MOST RECENT SYSTOLIC BLOOD PRESS GE 130-139MM HG: ICD-10-PCS | Mod: CPTII,S$GLB,, | Performed by: UROLOGY

## 2022-09-22 NOTE — PROGRESS NOTES
CHIEF COMPLAINT:    Mr. Waite is a 82 y.o. male presenting with peyronie's.    PRESENTING ILLNESS:    Carolann Waite is a 82 y.o. male who c/o peyronie's disease and severe ED.  He's s/p placement of a 3 piece AMS IPP with modeling on 12/14/21.  His pump migrated cephalad somewhat and was painful for him.  On 9/8/22, he underwent repair of his IPP with relocation of the pump.    REVIEW OF SYSTEMS:    Carolann Waite denies headache, blurred vision, fever, nausea, vomiting, chills, abdominal pain, chest pain, sore throat, bleeding per rectum, cough, SOB, recent loss of consciousness, recent mental status changes, seizures, dizziness, or upper or lower extremity weakness.    LUIS MANUEL  1. 1  2. 0  3. 0  4. 0  5. 0      PATIENT HISTORY:    Past Medical History:   Diagnosis Date    Arthritis     Cataract     OU    Colon polyps     Diabetes     Diabetes mellitus, type 2     Diverticulosis     Gout     History of colon polyps 10/8/2014    HTN (hypertension)     Hyperlipidemia LDL goal <100     Impotence     Melanoma     Left forearm, s/p excision    S/P CABG x 4     Squamous cell carcinoma     Head/nose       Past Surgical History:   Procedure Laterality Date    cancer of skin (nose)  02/2014    basal cell ca    CIRCUMCISION, PRIMARY      COLONOSCOPY  7/31/2009  Radhames    Normal colon and TI.    COLONOSCOPY  10/08/2014    Dr. Ricci, repeat in 6-7 years    COLONOSCOPY N/A 01/18/2019    Procedure: COLONOSCOPY;  Surgeon: Ron Ricci Jr., MD;  Location: [facility] ENDO;  Service: Endoscopy;  Laterality: N/A;    COLONOSCOPY W/ POLYPECTOMY  8/7/2006  Radhames    Three cecal polyps, largest 4mm x 12mm.  TUBULAR ADENOMAS.  One 2 mm in the hepatic flexure.  TUBULAR ADENOMAS.    CORONARY ARTERY BYPASS GRAFT (CABG) N/A 3/20/2022    Procedure: CABG W/ LIMA X 4 VESSELS;  Surgeon: Catarino Banuelos MD;  Location: [facility] OR;  Service: Cardiovascular;  Laterality: N/A;    ENDOSCOPIC HARVEST OF VEIN Left 3/20/2022    Procedure: SURGICAL PROCUREMENT,  VEIN, ENDOSCOPIC;  Surgeon: Catarino Banuelos MD;  Location: [facility] OR;  Service: Cardiovascular;  Laterality: Left;    EPIDURAL STEROID INJECTION INTO CERVICAL SPINE N/A 08/15/2018    Procedure: Injection-steroid-epidural-cervical;  Surgeon: Keny Ibanez MD;  Location: [facility] OR;  Service: Pain Management;  Laterality: N/A;  to left    FLEXIBLE SIGMOIDOSCOPY  ~  [place]    INSERTION OF INFLATABLE PENILE PROSTHESIS N/A 2021    Procedure: INSERTION, PENILE PROSTHESIS, INFLATABLE;  Surgeon: Juventino Rodriguez MD;  Location: [facility] OR [facility] FLR;  Service: Urology;  Laterality: N/A;  1.5hr    LEFT HEART CATHETERIZATION N/A 3/15/2022    Procedure: Left heart cath;  Surgeon: Kulwinder Francis III, MD;  Location: [facility] CATH;  Service: Cardiology;  Laterality: N/A;    RECONSTRUCTION OF PENIS N/A 2021    Procedure: RECONSTRUCTION, PENIS remodeling;  Surgeon: Juventino Rodriguez MD;  Location: [facility] OR [facility] FLR;  Service: Urology;  Laterality: N/A;  1.5hr    removal of lipoma      R arm    removal of melanoma      L arm    TONSILLECTOMY      UPPER GASTROINTESTINAL ENDOSCOPY  in his 30's    normal findings per patient report       Family History   Problem Relation Age of Onset    Diabetes Mother     Cancer Father         lymphoma    Hypertension Father     Nephrolithiasis Father     Heart disease Brother 56        mi    Cancer Brother         Prostate    Heart attack Brother     Colon cancer Neg Hx     Colon polyps Neg Hx     Crohn's disease Neg Hx     Ulcerative colitis Neg Hx     Stomach cancer Neg Hx     Esophageal cancer Neg Hx     Anesthesia problems Neg Hx        Social History     Socioeconomic History    Marital status:    Tobacco Use    Smoking status: Former     Types: Cigars     Quit date: 3/20/2022     Years since quittin.5    Smokeless tobacco: Never    Tobacco comments:     smokes a cigar on occasion   Substance and Sexual Activity    Alcohol use: Not Currently     Comment: 1 drink per month     Drug use: No    Sexual activity: Yes     Partners: Female       Allergies:  Hydrocodone    Medications:    Current Outpatient Medications:     acetaminophen (TYLENOL) 325 MG tablet, Take 2 tablets (650 mg total) by mouth every 6 (six) hours as needed for Pain., Disp: , Rfl: 0    amLODIPine (NORVASC) 10 MG tablet, TAKE 1 TABLET EVERY DAY, Disp: 90 tablet, Rfl: 0    aspirin (ECOTRIN) 81 MG EC tablet, Take 81 mg by mouth once daily. Takes as a preventative, Disp: , Rfl:     atorvastatin (LIPITOR) 20 MG tablet, TAKE 1 TABLET EVERY DAY, Disp: 90 tablet, Rfl: 2    blood sugar diagnostic (ACCU-CHEK MILENA PLUS TEST STRP MISC), 1 strip by Misc.(Non-Drug; Combo Route) route 2 (two) times a day., Disp: , Rfl:     dicloxacillin (DYNAPEN) 250 MG capsule, Take 2 capsules (500 mg total) by mouth 4 (four) times daily., Disp: 240 capsule, Rfl: 0    etodolac (LODINE) 200 MG Cap, Take 1 capsule (200 mg total) by mouth every 8 (eight) hours as needed. To use for gout attack related pain instead of indocin., Disp: 90 capsule, Rfl: 1    fexofenadine (ALLEGRA) 180 MG tablet, Take 180 mg by mouth daily as needed (post nasal drip)., Disp: , Rfl:     fluticasone (FLONASE) 50 mcg/actuation nasal spray, 2 sprays by Nasal route daily as needed. 1 - 2 Spray(s) Nasal daily., Disp: 16 g, Rfl: 2    Lactobacillus acidophilus (PROBIOTIC ORAL), Take 1 tablet by mouth every morning., Disp: , Rfl:     losartan (COZAAR) 100 MG tablet, Take 50 mg by mouth every morning., Disp: , Rfl:     mupirocin (BACTROBAN) 2 % ointment, Apply topically 2 (two) times daily as needed (sores)., Disp: , Rfl:     pantoprazole (PROTONIX) 40 MG tablet, TAKE 1 TABLET EVERY DAY, Disp: 90 tablet, Rfl: 0    polyethylene glycol (GLYCOLAX) 17 gram/dose powder, Mix 1 capful (17 g) with a liquid and take by mouth once daily., Disp: 238 g, Rfl: 0    polyethylene glycol (GLYCOLAX) 17 gram/dose powder, Mix 1 capful (17 g) in liquid and drink by mouth once daily., Disp: 510 g, Rfl:  0    RESTASIS 0.05 % ophthalmic emulsion, Place 1 drop into both eyes 2 (two) times daily., Disp: , Rfl:     SITagliptin (JANUVIA) 100 MG Tab, Take 50 mg by mouth every morning., Disp: , Rfl:     tamsulosin (FLOMAX) 0.4 mg Cap, Take 1 capsule (0.4 mg total) by mouth once daily., Disp: 30 capsule, Rfl: 11    triamcinolone acetonide 0.1% (KENALOG) 0.1 % cream, Apply topically daily as needed (itch)., Disp: , Rfl:     vitamin D (VITAMIN D3) 1000 units Tab, Take 1,000 Units by mouth every morning., Disp: , Rfl:     oxyCODONE-acetaminophen (PERCOCET) 5-325 mg per tablet, Take 1 tablet by mouth every 4 (four) hours as needed for Pain. (Patient not taking: Reported on 9/22/2022), Disp: 10 each, Rfl: 0    PHYSICAL EXAMINATION:    The patient generally appears in good health, is appropriately interactive, and is in no apparent distress.     Eyes: anicteric sclerae, moist conjunctivae; no lid-lag; PERRLA     HENT: Atraumatic; oropharynx clear with moist mucous membranes and no mucosal ulcerations;normal hard and soft palate.  No evidence of lymphadenopathy.    Neck: Trachea midline.  No thyromegaly.    Skin: No lesions.    Mental: Cooperative with normal affect.  Is oriented to time, place, and person.    Neuro: Grossly intact.    Chest: Normal inspiratory effort.   No accessory muscles.  No audible wheezes.  Respirations symmetric on inspiration and expiration.    Heart: Regular rhythm.      Abdomen:  Soft, non-tender. No masses or organomegaly. Bladder is not palpable. No evidence of flank discomfort. No evidence of inguinal hernia.    Genitourinary: The penis is circumcised with a plaque c/w peyronie's on the shaft. The urethral meatus is normal. The testes, epididymides, and cord structures are normal in size and contour bilaterally. The scrotum is normal in size and contour. The IPP components are palpable in the penis and scrotum.    LYDIA done by Dr. Gutierrez 10/2021.  Deferred.    Extremities: No clubbing, cyanosis, or  edema      LABS:    UA dipped negative today  Lab Results   Component Value Date    PSA 2.6 08/23/2021    PSA 3.7 11/06/2017    PSA 1.6 05/17/2017    PSADIAG 2.1 08/22/2018       IMPRESSION:    Encounter Diagnoses   Name Primary?    Erectile dysfunction due to arterial insufficiency Yes   HTN, stable  Hyperlipidemia, stable  DM, stable      PLAN:    1. RTC 4 weeks to inflate/deflate with an CHASIDY.  2. RTC 3 months.      Copy to:

## 2022-10-11 LAB — FUNGUS SPEC CULT: NORMAL

## 2022-10-18 ENCOUNTER — TELEPHONE (OUTPATIENT)
Dept: UROLOGY | Facility: CLINIC | Age: 82
End: 2022-10-18
Payer: MEDICARE

## 2022-10-18 NOTE — TELEPHONE ENCOUNTER
Call placed to patient. Name and date of birth verified. Patient stated he is having trouble inflating device. Patient instructed to keep scheduled appointment with AGGIE Maier for inflations and deflation. Patient states pain at times advised ice on and off as tolerated and to take extra strength tylenol as directed. Patient verbalized understanding.

## 2022-10-24 ENCOUNTER — OFFICE VISIT (OUTPATIENT)
Dept: UROLOGY | Facility: CLINIC | Age: 82
End: 2022-10-24
Payer: MEDICARE

## 2022-10-24 VITALS
DIASTOLIC BLOOD PRESSURE: 68 MMHG | SYSTOLIC BLOOD PRESSURE: 155 MMHG | HEIGHT: 74 IN | WEIGHT: 164.19 LBS | HEART RATE: 66 BPM | BODY MASS INDEX: 21.07 KG/M2

## 2022-10-24 DIAGNOSIS — Z96.0 S/P INSERTION OF PENILE IMPLANT: Primary | ICD-10-CM

## 2022-10-24 DIAGNOSIS — N50.812 PAIN IN LEFT TESTICLE: ICD-10-CM

## 2022-10-24 DIAGNOSIS — Z98.890 POST-OPERATIVE STATE: ICD-10-CM

## 2022-10-24 PROCEDURE — 1159F PR MEDICATION LIST DOCUMENTED IN MEDICAL RECORD: ICD-10-PCS | Mod: CPTII,S$GLB,, | Performed by: NURSE PRACTITIONER

## 2022-10-24 PROCEDURE — 3077F SYST BP >= 140 MM HG: CPT | Mod: CPTII,S$GLB,, | Performed by: NURSE PRACTITIONER

## 2022-10-24 PROCEDURE — 3078F DIAST BP <80 MM HG: CPT | Mod: CPTII,S$GLB,, | Performed by: NURSE PRACTITIONER

## 2022-10-24 PROCEDURE — 1160F PR REVIEW ALL MEDS BY PRESCRIBER/CLIN PHARMACIST DOCUMENTED: ICD-10-PCS | Mod: CPTII,S$GLB,, | Performed by: NURSE PRACTITIONER

## 2022-10-24 PROCEDURE — 1101F PT FALLS ASSESS-DOCD LE1/YR: CPT | Mod: CPTII,S$GLB,, | Performed by: NURSE PRACTITIONER

## 2022-10-24 PROCEDURE — 1125F AMNT PAIN NOTED PAIN PRSNT: CPT | Mod: CPTII,S$GLB,, | Performed by: NURSE PRACTITIONER

## 2022-10-24 PROCEDURE — 3078F PR MOST RECENT DIASTOLIC BLOOD PRESSURE < 80 MM HG: ICD-10-PCS | Mod: CPTII,S$GLB,, | Performed by: NURSE PRACTITIONER

## 2022-10-24 PROCEDURE — 1125F PR PAIN SEVERITY QUANTIFIED, PAIN PRESENT: ICD-10-PCS | Mod: CPTII,S$GLB,, | Performed by: NURSE PRACTITIONER

## 2022-10-24 PROCEDURE — 1159F MED LIST DOCD IN RCRD: CPT | Mod: CPTII,S$GLB,, | Performed by: NURSE PRACTITIONER

## 2022-10-24 PROCEDURE — 99999 PR PBB SHADOW E&M-EST. PATIENT-LVL III: CPT | Mod: PBBFAC,,, | Performed by: NURSE PRACTITIONER

## 2022-10-24 PROCEDURE — 1101F PR PT FALLS ASSESS DOC 0-1 FALLS W/OUT INJ PAST YR: ICD-10-PCS | Mod: CPTII,S$GLB,, | Performed by: NURSE PRACTITIONER

## 2022-10-24 PROCEDURE — 99024 POSTOP FOLLOW-UP VISIT: CPT | Mod: S$GLB,,, | Performed by: NURSE PRACTITIONER

## 2022-10-24 PROCEDURE — 1160F RVW MEDS BY RX/DR IN RCRD: CPT | Mod: CPTII,S$GLB,, | Performed by: NURSE PRACTITIONER

## 2022-10-24 PROCEDURE — 3288F PR FALLS RISK ASSESSMENT DOCUMENTED: ICD-10-PCS | Mod: CPTII,S$GLB,, | Performed by: NURSE PRACTITIONER

## 2022-10-24 PROCEDURE — 3077F PR MOST RECENT SYSTOLIC BLOOD PRESSURE >= 140 MM HG: ICD-10-PCS | Mod: CPTII,S$GLB,, | Performed by: NURSE PRACTITIONER

## 2022-10-24 PROCEDURE — 3288F FALL RISK ASSESSMENT DOCD: CPT | Mod: CPTII,S$GLB,, | Performed by: NURSE PRACTITIONER

## 2022-10-24 PROCEDURE — 99999 PR PBB SHADOW E&M-EST. PATIENT-LVL III: ICD-10-PCS | Mod: PBBFAC,,, | Performed by: NURSE PRACTITIONER

## 2022-10-24 PROCEDURE — 99024 PR POST-OP FOLLOW-UP VISIT: ICD-10-PCS | Mod: S$GLB,,, | Performed by: NURSE PRACTITIONER

## 2022-10-24 RX ORDER — NAPROXEN 500 MG/1
500 TABLET ORAL 2 TIMES DAILY WITH MEALS
Qty: 30 TABLET | Refills: 1 | Status: SHIPPED | OUTPATIENT
Start: 2022-10-24 | End: 2022-10-25 | Stop reason: SDUPTHER

## 2022-10-24 RX ORDER — DICLOFENAC SODIUM 10 MG/G
2 GEL TOPICAL 3 TIMES DAILY
Qty: 1 EACH | Refills: 1 | Status: SHIPPED | OUTPATIENT
Start: 2022-10-24 | End: 2022-10-25 | Stop reason: SDUPTHER

## 2022-10-24 NOTE — PROGRESS NOTES
Carolann Waite is a 82 y.o. male who c/o peyronie's disease and severe ED.    He's s/p placement of a 3 piece AMS IPP with modeling on 12/14/21.    His pump migrated cephalad somewhat and was painful for him.      On 09/08/2022, he underwent repair of his IPP with relocation of the pump.   Last clinic visit was 09/22/2022 with Dr. Rodriguez.    Today he states he is still having pain. Difficulty with moving/sitting.   Does not feel he can do today; almost did not come today.  Reports that his scrotum is darker as well.   No urinary complaints  Not taking any pain meds; wearing briefs.    I examined him.  Scrotal incision closed; no redness or swelling.  IPP scrotal components good location and identifiable.   I slowly inflated; reported slight pinching; left testicle pain  Reported slight pinch when deflating.   Very concerned about the pain.   I held up his scrotum and he noticed improvement in the discomfort.  Reassurance given;   Discussed that he need to get better scrotal support; briefs not effective.  Rx for Naprosyn 500mg BID with food x 2 weeks; voltaren Gel 3 x day.  RTC 2 weeks to recheck him.

## 2022-10-25 ENCOUNTER — TELEPHONE (OUTPATIENT)
Dept: UROLOGY | Facility: CLINIC | Age: 82
End: 2022-10-25
Payer: MEDICARE

## 2022-10-25 RX ORDER — DICLOFENAC SODIUM 10 MG/G
2 GEL TOPICAL 3 TIMES DAILY
Qty: 1 EACH | Refills: 1 | Status: SHIPPED | OUTPATIENT
Start: 2022-10-25 | End: 2024-01-17

## 2022-10-25 RX ORDER — NAPROXEN 500 MG/1
500 TABLET ORAL 2 TIMES DAILY WITH MEALS
Qty: 30 TABLET | Refills: 1 | Status: SHIPPED | OUTPATIENT
Start: 2022-10-25 | End: 2022-11-09 | Stop reason: SDUPTHER

## 2022-10-25 NOTE — TELEPHONE ENCOUNTER
----- Message from Neva Bhandari MA sent at 10/25/2022  8:38 AM CDT -----  Contact: [phone]    Type:  RX Refill Request      Who Called:Pt      Refill or New Rx: Refill      RX Name and Strength:diclofenac sodium (VOLTAREN) 1 % Gel and naproxen (NAPROSYN) 500 MG tablet      Preferred Pharmacy with phone number:      [facility]/pharmacy #[id number] - [place], [place] - [address] [facility]  [address] [address]  Phone: [phone] Fax: [phone]         Local or Mail Order:      Would the patient rather a call back or a response via [facility]?      Best Call Back Number: [phone]       Additional Information: Pt would like for both medication to be sent to [facility] in [place]. Pt states he is to return to clinic in  2 weeks to follow-up from this. He would have to wait on mail order longer. Please reach out to pt regarding this.

## 2022-10-25 NOTE — TELEPHONE ENCOUNTER
Call placed to patient. Name and date of birth verified. Patient informed AGGIE Maier sent Rx to [facility] pharmacy as requested. Patient verbalized understanding.

## 2022-11-09 ENCOUNTER — OFFICE VISIT (OUTPATIENT)
Dept: UROLOGY | Facility: CLINIC | Age: 82
End: 2022-11-09
Payer: MEDICARE

## 2022-11-09 VITALS
WEIGHT: 164 LBS | BODY MASS INDEX: 21.05 KG/M2 | SYSTOLIC BLOOD PRESSURE: 163 MMHG | HEART RATE: 70 BPM | HEIGHT: 74 IN | DIASTOLIC BLOOD PRESSURE: 75 MMHG

## 2022-11-09 DIAGNOSIS — N50.812 PAIN IN LEFT TESTICLE: ICD-10-CM

## 2022-11-09 DIAGNOSIS — Z96.0 S/P INSERTION OF PENILE IMPLANT: ICD-10-CM

## 2022-11-09 DIAGNOSIS — Z98.890 POST-OPERATIVE STATE: Primary | ICD-10-CM

## 2022-11-09 PROCEDURE — 1126F PR PAIN SEVERITY QUANTIFIED, NO PAIN PRESENT: ICD-10-PCS | Mod: CPTII,S$GLB,, | Performed by: NURSE PRACTITIONER

## 2022-11-09 PROCEDURE — 1160F RVW MEDS BY RX/DR IN RCRD: CPT | Mod: CPTII,S$GLB,, | Performed by: NURSE PRACTITIONER

## 2022-11-09 PROCEDURE — 1101F PR PT FALLS ASSESS DOC 0-1 FALLS W/OUT INJ PAST YR: ICD-10-PCS | Mod: CPTII,S$GLB,, | Performed by: NURSE PRACTITIONER

## 2022-11-09 PROCEDURE — 1159F PR MEDICATION LIST DOCUMENTED IN MEDICAL RECORD: ICD-10-PCS | Mod: CPTII,S$GLB,, | Performed by: NURSE PRACTITIONER

## 2022-11-09 PROCEDURE — 3077F PR MOST RECENT SYSTOLIC BLOOD PRESSURE >= 140 MM HG: ICD-10-PCS | Mod: CPTII,S$GLB,, | Performed by: NURSE PRACTITIONER

## 2022-11-09 PROCEDURE — 1126F AMNT PAIN NOTED NONE PRSNT: CPT | Mod: CPTII,S$GLB,, | Performed by: NURSE PRACTITIONER

## 2022-11-09 PROCEDURE — 99999 PR PBB SHADOW E&M-EST. PATIENT-LVL IV: CPT | Mod: PBBFAC,,, | Performed by: NURSE PRACTITIONER

## 2022-11-09 PROCEDURE — 3288F PR FALLS RISK ASSESSMENT DOCUMENTED: ICD-10-PCS | Mod: CPTII,S$GLB,, | Performed by: NURSE PRACTITIONER

## 2022-11-09 PROCEDURE — 1159F MED LIST DOCD IN RCRD: CPT | Mod: CPTII,S$GLB,, | Performed by: NURSE PRACTITIONER

## 2022-11-09 PROCEDURE — 3078F PR MOST RECENT DIASTOLIC BLOOD PRESSURE < 80 MM HG: ICD-10-PCS | Mod: CPTII,S$GLB,, | Performed by: NURSE PRACTITIONER

## 2022-11-09 PROCEDURE — 99024 PR POST-OP FOLLOW-UP VISIT: ICD-10-PCS | Mod: S$GLB,,, | Performed by: NURSE PRACTITIONER

## 2022-11-09 PROCEDURE — 3077F SYST BP >= 140 MM HG: CPT | Mod: CPTII,S$GLB,, | Performed by: NURSE PRACTITIONER

## 2022-11-09 PROCEDURE — 99999 PR PBB SHADOW E&M-EST. PATIENT-LVL IV: ICD-10-PCS | Mod: PBBFAC,,, | Performed by: NURSE PRACTITIONER

## 2022-11-09 PROCEDURE — 99024 POSTOP FOLLOW-UP VISIT: CPT | Mod: S$GLB,,, | Performed by: NURSE PRACTITIONER

## 2022-11-09 PROCEDURE — 3288F FALL RISK ASSESSMENT DOCD: CPT | Mod: CPTII,S$GLB,, | Performed by: NURSE PRACTITIONER

## 2022-11-09 PROCEDURE — 1160F PR REVIEW ALL MEDS BY PRESCRIBER/CLIN PHARMACIST DOCUMENTED: ICD-10-PCS | Mod: CPTII,S$GLB,, | Performed by: NURSE PRACTITIONER

## 2022-11-09 PROCEDURE — 1101F PT FALLS ASSESS-DOCD LE1/YR: CPT | Mod: CPTII,S$GLB,, | Performed by: NURSE PRACTITIONER

## 2022-11-09 PROCEDURE — 3078F DIAST BP <80 MM HG: CPT | Mod: CPTII,S$GLB,, | Performed by: NURSE PRACTITIONER

## 2022-11-09 RX ORDER — NAPROXEN 500 MG/1
500 TABLET ORAL 2 TIMES DAILY WITH MEALS
Qty: 30 TABLET | Refills: 1 | Status: SHIPPED | OUTPATIENT
Start: 2022-11-09 | End: 2022-11-24

## 2022-11-09 NOTE — PROGRESS NOTES
Carolann Waite is a 82 y.o. male who c/o peyronie's disease and severe ED.    He's s/p placement of a 3 piece AMS IPP with modeling on 12/14/21.    His pump migrated cephalad somewhat and was painful for him.       On 09/08/2022, he underwent repair of his IPP with relocation of the pump.   Last clinic visit was 09/22/2022 with Dr. Rodriguez.     10/24/2022 was seen in clinic for initial cycling.   Scrotal incision closed; no redness or swelling.  IPP scrotal components good location and identifiable.   I slowly inflated; reported slight pinching; left testicle pain  Reported slight pinch when deflating.  He was started on continuous antiinflammatories.     He is here today for f/u and possible teaching.   States pain is better; still present but tolerable   Reports it as a 3 on pain scale of 1-10.   Area of concern is upper left part of the scrotum  No visible injury. Incision closed;   No erythema swelling noted    I palpated the pump and squeezed bulb and he tolerated well.   I was able to cycle without difficulty.    He was able to identify the components.   He then attempted to cycle initially bulb was stuck, I was successful with trouble shooting and he then was able to cycle with ease.  He did this well and did not report severe discomfort.   I explained to him what I did and what he can do if the bulb sticks.  Emphasized the importance of cycling daily to help prevent issues.  Ok to continue antiinflammatories.     He has a f/u with Dr. Rodriguez scheduled but he can RTC any time to see him and help with his IPP.   Voiced understanding

## 2022-11-28 ENCOUNTER — OFFICE VISIT (OUTPATIENT)
Dept: UROLOGY | Facility: CLINIC | Age: 82
End: 2022-11-28
Payer: MEDICARE

## 2022-11-28 VITALS
WEIGHT: 164 LBS | BODY MASS INDEX: 21.05 KG/M2 | HEIGHT: 74 IN | DIASTOLIC BLOOD PRESSURE: 72 MMHG | SYSTOLIC BLOOD PRESSURE: 152 MMHG | HEART RATE: 75 BPM

## 2022-11-28 DIAGNOSIS — Z96.0 S/P INSERTION OF PENILE IMPLANT: ICD-10-CM

## 2022-11-28 DIAGNOSIS — Z98.890 POST-OPERATIVE STATE: Primary | ICD-10-CM

## 2022-11-28 PROCEDURE — 1126F AMNT PAIN NOTED NONE PRSNT: CPT | Mod: CPTII,S$GLB,, | Performed by: NURSE PRACTITIONER

## 2022-11-28 PROCEDURE — 3077F SYST BP >= 140 MM HG: CPT | Mod: CPTII,S$GLB,, | Performed by: NURSE PRACTITIONER

## 2022-11-28 PROCEDURE — 3078F DIAST BP <80 MM HG: CPT | Mod: CPTII,S$GLB,, | Performed by: NURSE PRACTITIONER

## 2022-11-28 PROCEDURE — 3078F PR MOST RECENT DIASTOLIC BLOOD PRESSURE < 80 MM HG: ICD-10-PCS | Mod: CPTII,S$GLB,, | Performed by: NURSE PRACTITIONER

## 2022-11-28 PROCEDURE — 1159F MED LIST DOCD IN RCRD: CPT | Mod: CPTII,S$GLB,, | Performed by: NURSE PRACTITIONER

## 2022-11-28 PROCEDURE — 99024 POSTOP FOLLOW-UP VISIT: CPT | Mod: S$GLB,,, | Performed by: NURSE PRACTITIONER

## 2022-11-28 PROCEDURE — 1126F PR PAIN SEVERITY QUANTIFIED, NO PAIN PRESENT: ICD-10-PCS | Mod: CPTII,S$GLB,, | Performed by: NURSE PRACTITIONER

## 2022-11-28 PROCEDURE — 1160F PR REVIEW ALL MEDS BY PRESCRIBER/CLIN PHARMACIST DOCUMENTED: ICD-10-PCS | Mod: CPTII,S$GLB,, | Performed by: NURSE PRACTITIONER

## 2022-11-28 PROCEDURE — 1101F PR PT FALLS ASSESS DOC 0-1 FALLS W/OUT INJ PAST YR: ICD-10-PCS | Mod: CPTII,S$GLB,, | Performed by: NURSE PRACTITIONER

## 2022-11-28 PROCEDURE — 1159F PR MEDICATION LIST DOCUMENTED IN MEDICAL RECORD: ICD-10-PCS | Mod: CPTII,S$GLB,, | Performed by: NURSE PRACTITIONER

## 2022-11-28 PROCEDURE — 3288F FALL RISK ASSESSMENT DOCD: CPT | Mod: CPTII,S$GLB,, | Performed by: NURSE PRACTITIONER

## 2022-11-28 PROCEDURE — 99999 PR PBB SHADOW E&M-EST. PATIENT-LVL IV: CPT | Mod: PBBFAC,,, | Performed by: NURSE PRACTITIONER

## 2022-11-28 PROCEDURE — 99999 PR PBB SHADOW E&M-EST. PATIENT-LVL IV: ICD-10-PCS | Mod: PBBFAC,,, | Performed by: NURSE PRACTITIONER

## 2022-11-28 PROCEDURE — 1160F RVW MEDS BY RX/DR IN RCRD: CPT | Mod: CPTII,S$GLB,, | Performed by: NURSE PRACTITIONER

## 2022-11-28 PROCEDURE — 3288F PR FALLS RISK ASSESSMENT DOCUMENTED: ICD-10-PCS | Mod: CPTII,S$GLB,, | Performed by: NURSE PRACTITIONER

## 2022-11-28 PROCEDURE — 1101F PT FALLS ASSESS-DOCD LE1/YR: CPT | Mod: CPTII,S$GLB,, | Performed by: NURSE PRACTITIONER

## 2022-11-28 PROCEDURE — 3077F PR MOST RECENT SYSTOLIC BLOOD PRESSURE >= 140 MM HG: ICD-10-PCS | Mod: CPTII,S$GLB,, | Performed by: NURSE PRACTITIONER

## 2022-11-28 PROCEDURE — 99024 PR POST-OP FOLLOW-UP VISIT: ICD-10-PCS | Mod: S$GLB,,, | Performed by: NURSE PRACTITIONER

## 2022-11-28 NOTE — PROGRESS NOTES
Carolann Waite is a 82 y.o. male who c/o peyronie's disease and severe ED.    He's s/p placement of a 3 piece AMS IPP with modeling on 12/14/21.    His pump migrated cephalad somewhat and was painful for him.       On 09/08/2022, he underwent repair of his IPP with relocation of the pump.   Last clinic visit was 09/22/2022 with Dr. Rodriguez.     10/24/2022 was seen in clinic for initial cycling.   Scrotal incision closed; no redness or swelling.  IPP scrotal components good location and identifiable.   I slowly inflated; reported slight pinching; left testicle pain  Reported slight pinch when deflating.  He was started on continuous antiinflammatories.      11/09/2022 he RTC for possible teaching.   States pain is better; still present but tolerable   Reports it as a 3 on pain scale of 1-10.   No visible injury. Incision closed;   No erythema swelling noted  He was able to cycle his IPP. (Initial bulb stick but then worked properly).    He is here today due to issues with his IPP.   States it is not getting firm enough; there is still some play in it.  He is still having the discomfort; sometimes can be very uncomfortable.     He was able to cycle his IPP appropriately. Discussed that IPP is working appropriately. Emphasized the importance of cycling daily to help prevent issues; as well as it might get a little firmer; remember that can't over inflate. Will get easier.   Will learn better positions to help ease with IPP use.   Ok to continue antiinflammatories.      He has a f/u with Dr. Rodriguez scheduled but he can RTC any time to see him and help with his IPP.   Voiced understanding

## 2022-12-21 ENCOUNTER — OFFICE VISIT (OUTPATIENT)
Dept: UROLOGY | Facility: CLINIC | Age: 82
End: 2022-12-21
Payer: MEDICARE

## 2022-12-21 VITALS
WEIGHT: 164.88 LBS | SYSTOLIC BLOOD PRESSURE: 145 MMHG | BODY MASS INDEX: 21.16 KG/M2 | HEART RATE: 66 BPM | HEIGHT: 74 IN | DIASTOLIC BLOOD PRESSURE: 68 MMHG

## 2022-12-21 DIAGNOSIS — N52.01 ERECTILE DYSFUNCTION DUE TO ARTERIAL INSUFFICIENCY: Primary | ICD-10-CM

## 2022-12-21 DIAGNOSIS — E11.8 DM TYPE 2 CAUSING COMPLICATION: ICD-10-CM

## 2022-12-21 DIAGNOSIS — I10 PRIMARY HYPERTENSION: ICD-10-CM

## 2022-12-21 DIAGNOSIS — E78.5 HYPERLIPIDEMIA LDL GOAL <70: ICD-10-CM

## 2022-12-21 DIAGNOSIS — N48.6 PEYRONIE'S DISEASE: ICD-10-CM

## 2022-12-21 PROCEDURE — 3288F PR FALLS RISK ASSESSMENT DOCUMENTED: ICD-10-PCS | Mod: CPTII,S$GLB,, | Performed by: UROLOGY

## 2022-12-21 PROCEDURE — 1160F PR REVIEW ALL MEDS BY PRESCRIBER/CLIN PHARMACIST DOCUMENTED: ICD-10-PCS | Mod: CPTII,S$GLB,, | Performed by: UROLOGY

## 2022-12-21 PROCEDURE — 3077F PR MOST RECENT SYSTOLIC BLOOD PRESSURE >= 140 MM HG: ICD-10-PCS | Mod: CPTII,S$GLB,, | Performed by: UROLOGY

## 2022-12-21 PROCEDURE — 1159F PR MEDICATION LIST DOCUMENTED IN MEDICAL RECORD: ICD-10-PCS | Mod: CPTII,S$GLB,, | Performed by: UROLOGY

## 2022-12-21 PROCEDURE — 3078F PR MOST RECENT DIASTOLIC BLOOD PRESSURE < 80 MM HG: ICD-10-PCS | Mod: CPTII,S$GLB,, | Performed by: UROLOGY

## 2022-12-21 PROCEDURE — 1126F AMNT PAIN NOTED NONE PRSNT: CPT | Mod: CPTII,S$GLB,, | Performed by: UROLOGY

## 2022-12-21 PROCEDURE — 3078F DIAST BP <80 MM HG: CPT | Mod: CPTII,S$GLB,, | Performed by: UROLOGY

## 2022-12-21 PROCEDURE — 1160F RVW MEDS BY RX/DR IN RCRD: CPT | Mod: CPTII,S$GLB,, | Performed by: UROLOGY

## 2022-12-21 PROCEDURE — 1101F PT FALLS ASSESS-DOCD LE1/YR: CPT | Mod: CPTII,S$GLB,, | Performed by: UROLOGY

## 2022-12-21 PROCEDURE — 99999 PR PBB SHADOW E&M-EST. PATIENT-LVL IV: CPT | Mod: PBBFAC,,, | Performed by: UROLOGY

## 2022-12-21 PROCEDURE — 3288F FALL RISK ASSESSMENT DOCD: CPT | Mod: CPTII,S$GLB,, | Performed by: UROLOGY

## 2022-12-21 PROCEDURE — 99999 PR PBB SHADOW E&M-EST. PATIENT-LVL IV: ICD-10-PCS | Mod: PBBFAC,,, | Performed by: UROLOGY

## 2022-12-21 PROCEDURE — 1159F MED LIST DOCD IN RCRD: CPT | Mod: CPTII,S$GLB,, | Performed by: UROLOGY

## 2022-12-21 PROCEDURE — 3077F SYST BP >= 140 MM HG: CPT | Mod: CPTII,S$GLB,, | Performed by: UROLOGY

## 2022-12-21 PROCEDURE — 99213 OFFICE O/P EST LOW 20 MIN: CPT | Mod: S$GLB,,, | Performed by: UROLOGY

## 2022-12-21 PROCEDURE — 1126F PR PAIN SEVERITY QUANTIFIED, NO PAIN PRESENT: ICD-10-PCS | Mod: CPTII,S$GLB,, | Performed by: UROLOGY

## 2022-12-21 PROCEDURE — 1101F PR PT FALLS ASSESS DOC 0-1 FALLS W/OUT INJ PAST YR: ICD-10-PCS | Mod: CPTII,S$GLB,, | Performed by: UROLOGY

## 2022-12-21 PROCEDURE — 99213 PR OFFICE/OUTPT VISIT, EST, LEVL III, 20-29 MIN: ICD-10-PCS | Mod: S$GLB,,, | Performed by: UROLOGY

## 2023-01-11 PROBLEM — I25.810 CORONARY ARTERY DISEASE INVOLVING CORONARY BYPASS GRAFT OF NATIVE HEART WITHOUT ANGINA PECTORIS: Status: ACTIVE | Noted: 2023-01-11

## 2023-01-18 ENCOUNTER — OFFICE VISIT (OUTPATIENT)
Dept: UROLOGY | Facility: CLINIC | Age: 83
End: 2023-01-18
Payer: MEDICARE

## 2023-01-18 ENCOUNTER — TELEPHONE (OUTPATIENT)
Dept: UROLOGY | Facility: CLINIC | Age: 83
End: 2023-01-18

## 2023-01-18 VITALS
HEART RATE: 62 BPM | HEIGHT: 74 IN | DIASTOLIC BLOOD PRESSURE: 69 MMHG | WEIGHT: 160.94 LBS | SYSTOLIC BLOOD PRESSURE: 137 MMHG | BODY MASS INDEX: 20.65 KG/M2

## 2023-01-18 DIAGNOSIS — I10 PRIMARY HYPERTENSION: ICD-10-CM

## 2023-01-18 DIAGNOSIS — N48.6 PEYRONIE'S DISEASE: ICD-10-CM

## 2023-01-18 DIAGNOSIS — E11.8 DM TYPE 2 CAUSING COMPLICATION: ICD-10-CM

## 2023-01-18 DIAGNOSIS — E78.5 HYPERLIPIDEMIA LDL GOAL <70: ICD-10-CM

## 2023-01-18 DIAGNOSIS — N52.01 ERECTILE DYSFUNCTION DUE TO ARTERIAL INSUFFICIENCY: Primary | ICD-10-CM

## 2023-01-18 DIAGNOSIS — T83.490A MALFUNCTION OF PENILE PROSTHESIS, INITIAL ENCOUNTER: Primary | ICD-10-CM

## 2023-01-18 PROCEDURE — 3075F PR MOST RECENT SYSTOLIC BLOOD PRESS GE 130-139MM HG: ICD-10-PCS | Mod: CPTII,S$GLB,, | Performed by: UROLOGY

## 2023-01-18 PROCEDURE — 99215 OFFICE O/P EST HI 40 MIN: CPT | Mod: S$GLB,,, | Performed by: UROLOGY

## 2023-01-18 PROCEDURE — 1101F PT FALLS ASSESS-DOCD LE1/YR: CPT | Mod: CPTII,S$GLB,, | Performed by: UROLOGY

## 2023-01-18 PROCEDURE — 1159F MED LIST DOCD IN RCRD: CPT | Mod: CPTII,S$GLB,, | Performed by: UROLOGY

## 2023-01-18 PROCEDURE — 1126F AMNT PAIN NOTED NONE PRSNT: CPT | Mod: CPTII,S$GLB,, | Performed by: UROLOGY

## 2023-01-18 PROCEDURE — 99215 PR OFFICE/OUTPT VISIT, EST, LEVL V, 40-54 MIN: ICD-10-PCS | Mod: S$GLB,,, | Performed by: UROLOGY

## 2023-01-18 PROCEDURE — 99999 PR PBB SHADOW E&M-EST. PATIENT-LVL IV: ICD-10-PCS | Mod: PBBFAC,,, | Performed by: UROLOGY

## 2023-01-18 PROCEDURE — 99999 PR PBB SHADOW E&M-EST. PATIENT-LVL IV: CPT | Mod: PBBFAC,,, | Performed by: UROLOGY

## 2023-01-18 PROCEDURE — 3288F FALL RISK ASSESSMENT DOCD: CPT | Mod: CPTII,S$GLB,, | Performed by: UROLOGY

## 2023-01-18 PROCEDURE — 1160F RVW MEDS BY RX/DR IN RCRD: CPT | Mod: CPTII,S$GLB,, | Performed by: UROLOGY

## 2023-01-18 PROCEDURE — 1126F PR PAIN SEVERITY QUANTIFIED, NO PAIN PRESENT: ICD-10-PCS | Mod: CPTII,S$GLB,, | Performed by: UROLOGY

## 2023-01-18 PROCEDURE — 3075F SYST BP GE 130 - 139MM HG: CPT | Mod: CPTII,S$GLB,, | Performed by: UROLOGY

## 2023-01-18 PROCEDURE — 1160F PR REVIEW ALL MEDS BY PRESCRIBER/CLIN PHARMACIST DOCUMENTED: ICD-10-PCS | Mod: CPTII,S$GLB,, | Performed by: UROLOGY

## 2023-01-18 PROCEDURE — 1159F PR MEDICATION LIST DOCUMENTED IN MEDICAL RECORD: ICD-10-PCS | Mod: CPTII,S$GLB,, | Performed by: UROLOGY

## 2023-01-18 PROCEDURE — 3288F PR FALLS RISK ASSESSMENT DOCUMENTED: ICD-10-PCS | Mod: CPTII,S$GLB,, | Performed by: UROLOGY

## 2023-01-18 PROCEDURE — 3078F DIAST BP <80 MM HG: CPT | Mod: CPTII,S$GLB,, | Performed by: UROLOGY

## 2023-01-18 PROCEDURE — 1101F PR PT FALLS ASSESS DOC 0-1 FALLS W/OUT INJ PAST YR: ICD-10-PCS | Mod: CPTII,S$GLB,, | Performed by: UROLOGY

## 2023-01-18 PROCEDURE — 3078F PR MOST RECENT DIASTOLIC BLOOD PRESSURE < 80 MM HG: ICD-10-PCS | Mod: CPTII,S$GLB,, | Performed by: UROLOGY

## 2023-01-18 RX ORDER — CEPHALEXIN 500 MG/1
1 CAPSULE ORAL 2 TIMES DAILY
COMMUNITY
Start: 2022-11-28 | End: 2023-07-19 | Stop reason: ALTCHOICE

## 2023-01-18 RX ORDER — TAMSULOSIN HYDROCHLORIDE 0.4 MG/1
0.4 CAPSULE ORAL DAILY
Qty: 14 CAPSULE | Refills: 0 | Status: SHIPPED | OUTPATIENT
Start: 2023-01-18 | End: 2023-07-19 | Stop reason: ALTCHOICE

## 2023-01-18 NOTE — H&P (VIEW-ONLY)
CHIEF COMPLAINT:    Mr. Waite is a 82 y.o. male presenting with peyronie's.    PRESENTING ILLNESS:    Carolann Waite is a 82 y.o. male who c/o peyronie's disease and severe ED.  He's s/p placement of a 3 piece AMS IPP with modeling on 12/14/21.  His pump migrated cephalad somewhat and was painful for him.  On 9/8/22, he underwent repair of his IPP with relocation of the pump.  He presents today c/o penile pain with inflation, pain with intercourse, and insufficient size of the penis.  Says the pain can be a 7/10 in intensity when he attempts intercourse.     REVIEW OF SYSTEMS:    Carolann Waite denies headache, blurred vision, fever, nausea, vomiting, chills, abdominal pain, chest pain, sore throat, bleeding per rectum, cough, SOB, recent loss of consciousness, recent mental status changes, seizures, dizziness, or upper or lower extremity weakness.    LUIS MANUEL  1. 1  2. 0  3. 1  4. 0  5. 0     PATIENT HISTORY:    Past Medical History:   Diagnosis Date    Arthritis     Cataract     OU    Colon polyps     Diabetes     Diabetes mellitus, type 2     Diverticulosis     Gout     History of colon polyps 10/8/2014    HTN (hypertension)     Hyperlipidemia LDL goal <100     Impotence     Melanoma     Left forearm, s/p excision    S/P CABG x 4     Squamous cell carcinoma     Head/nose       Past Surgical History:   Procedure Laterality Date    cancer of skin (nose)  02/2014    basal cell ca    CIRCUMCISION, PRIMARY      COLONOSCOPY  7/31/2009  Radhames    Normal colon and TI.    COLONOSCOPY  10/08/2014    Dr. Ricci, repeat in 6-7 years    COLONOSCOPY N/A 01/18/2019    Procedure: COLONOSCOPY;  Surgeon: Ron Ricci Jr., MD;  Location: [facility] ENDO;  Service: Endoscopy;  Laterality: N/A;    COLONOSCOPY W/ POLYPECTOMY  8/7/2006  Radhames    Three cecal polyps, largest 4mm x 12mm.  TUBULAR ADENOMAS.  One 2 mm in the hepatic flexure.  TUBULAR ADENOMAS.    CORONARY ARTERY BYPASS GRAFT (CABG) N/A 3/20/2022    Procedure: CABG W/ LIMA X 4 VESSELS;   Surgeon: Catarino Banuelos MD;  Location: [facility] OR;  Service: Cardiovascular;  Laterality: N/A;    ENDOSCOPIC HARVEST OF VEIN Left 3/20/2022    Procedure: SURGICAL PROCUREMENT, VEIN, ENDOSCOPIC;  Surgeon: Catarino Banuelos MD;  Location: [facility] OR;  Service: Cardiovascular;  Laterality: Left;    EPIDURAL STEROID INJECTION INTO CERVICAL SPINE N/A 08/15/2018    Procedure: Injection-steroid-epidural-cervical;  Surgeon: Keny Ibanez MD;  Location: [facility] OR;  Service: Pain Management;  Laterality: N/A;  to left    FLEXIBLE SIGMOIDOSCOPY  ~[address] [place]    INSERTION OF INFLATABLE PENILE PROSTHESIS N/A 12/14/2021    Procedure: INSERTION, PENILE PROSTHESIS, INFLATABLE;  Surgeon: Juventino Rodriguez MD;  [facility];  Service: Urology;  Laterality: N/A;  1.5hr    LEFT HEART CATHETERIZATION N/A 3/15/2022    Procedure: Left heart cath;  Surgeon: Kulwinder Francis III, MD;  Location: [facility] CATH;  Service: Cardiology;  Laterality: N/A;    RECONSTRUCTION OF PENIS N/A 12/14/2021    Procedure: RECONSTRUCTION, PENIS remodeling;  Surgeon: Juventino Rodriguez MD;  Location: [facility] OR 2ND FLR;  Service: Urology;  Laterality: N/A;  1.5hr    removal of lipoma      R arm    removal of melanoma  2009    L arm    TONSILLECTOMY      UPPER GASTROINTESTINAL ENDOSCOPY  in his 30's    normal findings per patient report       Family History   Problem Relation Age of Onset    Diabetes Mother     Cancer Father         lymphoma    Hypertension Father     Nephrolithiasis Father     Heart disease Brother 56        mi    Cancer Brother         Prostate    Heart attack Brother     Colon cancer Neg Hx     Colon polyps Neg Hx     Crohn's disease Neg Hx     Ulcerative colitis Neg Hx     Stomach cancer Neg Hx     Esophageal cancer Neg Hx     Anesthesia problems Neg Hx        Social History     Socioeconomic History    Marital status:    Tobacco Use    Smoking status: Former     Types: Cigars     Quit date: 3/20/2022     Years since  quittin.8    Smokeless tobacco: Never    Tobacco comments:     smokes a cigar on occasion   Substance and Sexual Activity    Alcohol use: Not Currently     Comment: 1 drink per month    Drug use: No    Sexual activity: Yes     Partners: Female       Allergies:  Hydrocodone    Medications:    Current Outpatient Medications:     acetaminophen (TYLENOL) 325 MG tablet, Take 2 tablets (650 mg total) by mouth every 6 (six) hours as needed for Pain., Disp: , Rfl: 0    amLODIPine (NORVASC) 10 MG tablet, TAKE 1 TABLET EVERY DAY, Disp: 90 tablet, Rfl: 0    atorvastatin (LIPITOR) 20 MG tablet, TAKE 1 TABLET EVERY DAY, Disp: 90 tablet, Rfl: 2    blood sugar diagnostic (ACCU-CHEK MILENA PLUS TEST STRP MISC), 1 strip by Misc.(Non-Drug; Combo Route) route 2 (two) times a day., Disp: , Rfl:     etodolac (LODINE) 200 MG Cap, Take 1 capsule (200 mg total) by mouth every 8 (eight) hours as needed. To use for gout attack related pain instead of indocin., Disp: 90 capsule, Rfl: 1    Lactobacillus acidophilus (PROBIOTIC ORAL), Take 1 tablet by mouth every morning., Disp: , Rfl:     losartan (COZAAR) 100 MG tablet, Take 50 mg by mouth every morning., Disp: , Rfl:     mupirocin (BACTROBAN) 2 % ointment, Apply topically 2 (two) times daily as needed (sores)., Disp: , Rfl:     pantoprazole (PROTONIX) 40 MG tablet, TAKE 1 TABLET EVERY DAY, Disp: 90 tablet, Rfl: 0    polyethylene glycol (GLYCOLAX) 17 gram/dose powder, Mix 1 capful (17 g) with a liquid and take by mouth once daily., Disp: 238 g, Rfl: 0    RESTASIS 0.05 % ophthalmic emulsion, Place 1 drop into both eyes 2 (two) times daily., Disp: , Rfl:     SITagliptin (JANUVIA) 100 MG Tab, Take 50 mg by mouth every morning., Disp: , Rfl:     triamcinolone acetonide 0.1% (KENALOG) 0.1 % cream, Apply topically daily as needed (itch)., Disp: , Rfl:     vitamin D (VITAMIN D3) 1000 units Tab, Take 1,000 Units by mouth every morning., Disp: , Rfl:     cephALEXin (KEFLEX) 500 MG capsule, Take 1  capsule by mouth 2 (two) times daily., Disp: , Rfl:     diclofenac sodium (VOLTAREN) 1 % Gel, Apply 2 g topically 3 (three) times daily. Apply to affected area TID x 7 days then PRN for 7 days, Disp: 1 each, Rfl: 1    PHYSICAL EXAMINATION:    The patient generally appears in good health, is appropriately interactive, and is in no apparent distress.     Eyes: anicteric sclerae, moist conjunctivae; no lid-lag; PERRLA     HENT: Atraumatic; oropharynx clear with moist mucous membranes and no mucosal ulcerations;normal hard and soft palate.  No evidence of lymphadenopathy.    Neck: Trachea midline.  No thyromegaly.    Skin: No lesions.    Mental: Cooperative with normal affect.  Is oriented to time, place, and person.    Neuro: Grossly intact.    Chest: Normal inspiratory effort.   No accessory muscles.  No audible wheezes.  Respirations symmetric on inspiration and expiration.    Heart: Regular rhythm.      Abdomen:  Soft, non-tender. No masses or organomegaly. Bladder is not palpable. No evidence of flank discomfort. No evidence of inguinal hernia.    Genitourinary: The penis is circumcised with a plaque c/w peyronie's on the shaft. The urethral meatus is normal. The testes, epididymides, and cord structures are normal in size and contour bilaterally. The scrotum is normal in size and contour. The IPP components are palpable in the penis and scrotum.    Extremities: No clubbing, cyanosis, or edema    LABS:    UA dipped negative today  Lab Results   Component Value Date    PSA 2.6 08/23/2021    PSA 3.7 11/06/2017    PSA 1.6 05/17/2017    PSADIAG 2.1 08/22/2018       IMPRESSION:    Encounter Diagnoses   Name Primary?    Erectile dysfunction due to arterial insufficiency Yes    Peyronie's disease     DM type 2 causing complication     Primary hypertension     Hyperlipidemia LDL goal <70    HTN, stable  Hyperlipidemia, stable  DM, stable      PLAN:    1. Discussed options for his severe ED (affected by above  comorbidities).  Discussed that the penis is as large and as firm as it can be with an implant.  However, because he is having pain, he'd like it removed.  Discussed that this will result in penile shortening and ED.  2. Will get a CT of the pelvis to define the anatomy.  Discussed that the reservoir may need to be removed through a counter incision.  3. I  have explained the risk, benefits, and alternatives of the procedure in detail. The patient voices understanding and all questions have been answered. The patient agrees to proceed as planned.    4. He went into AUR after his last surgery, so will start flomax a week prior to surgery.      Copy to:

## 2023-01-18 NOTE — PROGRESS NOTES
CHIEF COMPLAINT:    Mr. Waite is a 82 y.o. male presenting with peyronie's.    PRESENTING ILLNESS:    Carolann Waite is a 82 y.o. male who c/o peyronie's disease and severe ED.  He's s/p placement of a 3 piece AMS IPP with modeling on 12/14/21.  His pump migrated cephalad somewhat and was painful for him.  On 9/8/22, he underwent repair of his IPP with relocation of the pump.  He presents today c/o penile pain with inflation, pain with intercourse, and insufficient size of the penis.  Says the pain can be a 7/10 in intensity when he attempts intercourse.     REVIEW OF SYSTEMS:    Carolann Waite denies headache, blurred vision, fever, nausea, vomiting, chills, abdominal pain, chest pain, sore throat, bleeding per rectum, cough, SOB, recent loss of consciousness, recent mental status changes, seizures, dizziness, or upper or lower extremity weakness.    LUIS MANUEL  1. 1  2. 0  3. 1  4. 0  5. 0     PATIENT HISTORY:    Past Medical History:   Diagnosis Date    Arthritis     Cataract     OU    Colon polyps     Diabetes     Diabetes mellitus, type 2     Diverticulosis     Gout     History of colon polyps 10/8/2014    HTN (hypertension)     Hyperlipidemia LDL goal <100     Impotence     Melanoma     Left forearm, s/p excision    S/P CABG x 4     Squamous cell carcinoma     Head/nose       Past Surgical History:   Procedure Laterality Date    cancer of skin (nose)  02/2014    basal cell ca    CIRCUMCISION, PRIMARY      COLONOSCOPY  7/31/2009  Radhames    Normal colon and TI.    COLONOSCOPY  10/08/2014    Dr. Ricci, repeat in 6-7 years    COLONOSCOPY N/A 01/18/2019    Procedure: COLONOSCOPY;  Surgeon: Ron Ricci Jr., MD;  Location: [facility] ENDO;  Service: Endoscopy;  Laterality: N/A;    COLONOSCOPY W/ POLYPECTOMY  8/7/2006  Radhames    Three cecal polyps, largest 4mm x 12mm.  TUBULAR ADENOMAS.  One 2 mm in the hepatic flexure.  TUBULAR ADENOMAS.    CORONARY ARTERY BYPASS GRAFT (CABG) N/A 3/20/2022    Procedure: CABG W/ LIMA X 4 VESSELS;   Surgeon: Catarino Banuelos MD;  Location: [facility] OR;  Service: Cardiovascular;  Laterality: N/A;    ENDOSCOPIC HARVEST OF VEIN Left 3/20/2022    Procedure: SURGICAL PROCUREMENT, VEIN, ENDOSCOPIC;  Surgeon: Caatrino Banuelos MD;  Location: [facility] OR;  Service: Cardiovascular;  Laterality: Left;    EPIDURAL STEROID INJECTION INTO CERVICAL SPINE N/A 08/15/2018    Procedure: Injection-steroid-epidural-cervical;  Surgeon: Keny Ibanez MD;  Location: [facility] OR;  Service: Pain Management;  Laterality: N/A;  to left    FLEXIBLE SIGMOIDOSCOPY  ~[address] [place]    INSERTION OF INFLATABLE PENILE PROSTHESIS N/A 12/14/2021    Procedure: INSERTION, PENILE PROSTHESIS, INFLATABLE;  Surgeon: Juventino Rodriguez MD;  [facility];  Service: Urology;  Laterality: N/A;  1.5hr    LEFT HEART CATHETERIZATION N/A 3/15/2022    Procedure: Left heart cath;  Surgeon: Kulwinder Francis III, MD;  Location: [facility] CATH;  Service: Cardiology;  Laterality: N/A;    RECONSTRUCTION OF PENIS N/A 12/14/2021    Procedure: RECONSTRUCTION, PENIS remodeling;  Surgeon: Juventino Rodriguez MD;  Location: [facility] OR 2ND FLR;  Service: Urology;  Laterality: N/A;  1.5hr    removal of lipoma      R arm    removal of melanoma  2009    L arm    TONSILLECTOMY      UPPER GASTROINTESTINAL ENDOSCOPY  in his 30's    normal findings per patient report       Family History   Problem Relation Age of Onset    Diabetes Mother     Cancer Father         lymphoma    Hypertension Father     Nephrolithiasis Father     Heart disease Brother 56        mi    Cancer Brother         Prostate    Heart attack Brother     Colon cancer Neg Hx     Colon polyps Neg Hx     Crohn's disease Neg Hx     Ulcerative colitis Neg Hx     Stomach cancer Neg Hx     Esophageal cancer Neg Hx     Anesthesia problems Neg Hx        Social History     Socioeconomic History    Marital status:    Tobacco Use    Smoking status: Former     Types: Cigars     Quit date: 3/20/2022     Years since  quittin.8    Smokeless tobacco: Never    Tobacco comments:     smokes a cigar on occasion   Substance and Sexual Activity    Alcohol use: Not Currently     Comment: 1 drink per month    Drug use: No    Sexual activity: Yes     Partners: Female       Allergies:  Hydrocodone    Medications:    Current Outpatient Medications:     acetaminophen (TYLENOL) 325 MG tablet, Take 2 tablets (650 mg total) by mouth every 6 (six) hours as needed for Pain., Disp: , Rfl: 0    amLODIPine (NORVASC) 10 MG tablet, TAKE 1 TABLET EVERY DAY, Disp: 90 tablet, Rfl: 0    atorvastatin (LIPITOR) 20 MG tablet, TAKE 1 TABLET EVERY DAY, Disp: 90 tablet, Rfl: 2    blood sugar diagnostic (ACCU-CHEK MILENA PLUS TEST STRP MISC), 1 strip by Misc.(Non-Drug; Combo Route) route 2 (two) times a day., Disp: , Rfl:     etodolac (LODINE) 200 MG Cap, Take 1 capsule (200 mg total) by mouth every 8 (eight) hours as needed. To use for gout attack related pain instead of indocin., Disp: 90 capsule, Rfl: 1    Lactobacillus acidophilus (PROBIOTIC ORAL), Take 1 tablet by mouth every morning., Disp: , Rfl:     losartan (COZAAR) 100 MG tablet, Take 50 mg by mouth every morning., Disp: , Rfl:     mupirocin (BACTROBAN) 2 % ointment, Apply topically 2 (two) times daily as needed (sores)., Disp: , Rfl:     pantoprazole (PROTONIX) 40 MG tablet, TAKE 1 TABLET EVERY DAY, Disp: 90 tablet, Rfl: 0    polyethylene glycol (GLYCOLAX) 17 gram/dose powder, Mix 1 capful (17 g) with a liquid and take by mouth once daily., Disp: 238 g, Rfl: 0    RESTASIS 0.05 % ophthalmic emulsion, Place 1 drop into both eyes 2 (two) times daily., Disp: , Rfl:     SITagliptin (JANUVIA) 100 MG Tab, Take 50 mg by mouth every morning., Disp: , Rfl:     triamcinolone acetonide 0.1% (KENALOG) 0.1 % cream, Apply topically daily as needed (itch)., Disp: , Rfl:     vitamin D (VITAMIN D3) 1000 units Tab, Take 1,000 Units by mouth every morning., Disp: , Rfl:     cephALEXin (KEFLEX) 500 MG capsule, Take 1  capsule by mouth 2 (two) times daily., Disp: , Rfl:     diclofenac sodium (VOLTAREN) 1 % Gel, Apply 2 g topically 3 (three) times daily. Apply to affected area TID x 7 days then PRN for 7 days, Disp: 1 each, Rfl: 1    PHYSICAL EXAMINATION:    The patient generally appears in good health, is appropriately interactive, and is in no apparent distress.     Eyes: anicteric sclerae, moist conjunctivae; no lid-lag; PERRLA     HENT: Atraumatic; oropharynx clear with moist mucous membranes and no mucosal ulcerations;normal hard and soft palate.  No evidence of lymphadenopathy.    Neck: Trachea midline.  No thyromegaly.    Skin: No lesions.    Mental: Cooperative with normal affect.  Is oriented to time, place, and person.    Neuro: Grossly intact.    Chest: Normal inspiratory effort.   No accessory muscles.  No audible wheezes.  Respirations symmetric on inspiration and expiration.    Heart: Regular rhythm.      Abdomen:  Soft, non-tender. No masses or organomegaly. Bladder is not palpable. No evidence of flank discomfort. No evidence of inguinal hernia.    Genitourinary: The penis is circumcised with a plaque c/w peyronie's on the shaft. The urethral meatus is normal. The testes, epididymides, and cord structures are normal in size and contour bilaterally. The scrotum is normal in size and contour. The IPP components are palpable in the penis and scrotum.    Extremities: No clubbing, cyanosis, or edema    LABS:    UA dipped negative today  Lab Results   Component Value Date    PSA 2.6 08/23/2021    PSA 3.7 11/06/2017    PSA 1.6 05/17/2017    PSADIAG 2.1 08/22/2018       IMPRESSION:    Encounter Diagnoses   Name Primary?    Erectile dysfunction due to arterial insufficiency Yes    Peyronie's disease     DM type 2 causing complication     Primary hypertension     Hyperlipidemia LDL goal <70    HTN, stable  Hyperlipidemia, stable  DM, stable      PLAN:    1. Discussed options for his severe ED (affected by above  comorbidities).  Discussed that the penis is as large and as firm as it can be with an implant.  However, because he is having pain, he'd like it removed.  Discussed that this will result in penile shortening and ED.  2. Will get a CT of the pelvis to define the anatomy.  Discussed that the reservoir may need to be removed through a counter incision.  3. I  have explained the risk, benefits, and alternatives of the procedure in detail. The patient voices understanding and all questions have been answered. The patient agrees to proceed as planned.    4. He went into AUR after his last surgery, so will start flomax a week prior to surgery.      Copy to:

## 2023-01-23 ENCOUNTER — OFFICE VISIT (OUTPATIENT)
Dept: UROLOGY | Facility: CLINIC | Age: 83
End: 2023-01-23
Payer: MEDICARE

## 2023-01-23 DIAGNOSIS — T83.490A MALFUNCTION OF PENILE PROSTHESIS, INITIAL ENCOUNTER: Primary | ICD-10-CM

## 2023-01-23 PROCEDURE — 99499 UNLISTED E&M SERVICE: CPT | Mod: S$GLB,,, | Performed by: UROLOGY

## 2023-01-23 PROCEDURE — 99499 NO LOS: ICD-10-PCS | Mod: S$GLB,,, | Performed by: UROLOGY

## 2023-01-24 ENCOUNTER — HOSPITAL ENCOUNTER (OUTPATIENT)
Dept: RADIOLOGY | Facility: HOSPITAL | Age: 83
Discharge: HOME | End: 2023-01-24
Attending: UROLOGY
Payer: MEDICARE

## 2023-01-24 DIAGNOSIS — N52.01 ERECTILE DYSFUNCTION DUE TO ARTERIAL INSUFFICIENCY: ICD-10-CM

## 2023-01-24 PROCEDURE — 72192 CT PELVIS W/O DYE: CPT | Mod: TC,PO

## 2023-01-24 PROCEDURE — 72192 CT PELVIS WITHOUT CONTRAST: ICD-10-PCS | Mod: 26,,, | Performed by: RADIOLOGY

## 2023-01-24 PROCEDURE — 72192 CT PELVIS W/O DYE: CPT | Mod: 26,,, | Performed by: RADIOLOGY

## 2023-01-30 ENCOUNTER — TELEPHONE (OUTPATIENT)
Dept: UROLOGY | Facility: CLINIC | Age: 83
End: 2023-01-30
Payer: MEDICARE

## 2023-01-30 NOTE — PRE-PROCEDURE INSTRUCTIONS
PREOP INSTRUCTIONS:  No food,milk or milk products after midnight  Clear liquids like water,gatorade,apple juice are allowed up until 2 hours before surgery.  Instructed to follow the surgeon's instructions if they differ from these.  Shower instructions as well as directions to the Surgery Center were given.  Encouraged to wear loose fitting,comfortable clothing.  Medication instructions for pm prior to and am of procedure reviewed.  Instructed to avoid taking vitamins,supplements,aspirin and ibuprofen the morning of surgery.    Patient denies any side effects or issues with anesthesia or sedation.     Patient does not know arrival time.Explained that this information comes from the surgeon's office and if they haven't heard from them by 2 or 3 pm to call the office.Patient stated an understanding.

## 2023-01-30 NOTE — TELEPHONE ENCOUNTER
Called pt to confirm arrival time of 5:00am for procedure on 01-. Gave pt NPO instructions and gave pt opportunity to ask questions. Pt verbalized understanding.

## 2023-01-31 ENCOUNTER — HOSPITAL ENCOUNTER (OUTPATIENT)
Facility: HOSPITAL | Age: 83
Discharge: HOME | End: 2023-01-31
Attending: UROLOGY | Admitting: UROLOGY
Payer: MEDICARE

## 2023-01-31 ENCOUNTER — ANESTHESIA EVENT (OUTPATIENT)
Dept: SURGERY | Facility: HOSPITAL | Age: 83
End: 2023-01-31
Payer: MEDICARE

## 2023-01-31 ENCOUNTER — ANESTHESIA (OUTPATIENT)
Dept: SURGERY | Facility: HOSPITAL | Age: 83
End: 2023-01-31
Payer: MEDICARE

## 2023-01-31 VITALS
HEIGHT: 74 IN | SYSTOLIC BLOOD PRESSURE: 144 MMHG | OXYGEN SATURATION: 94 % | WEIGHT: 165 LBS | BODY MASS INDEX: 21.17 KG/M2 | RESPIRATION RATE: 16 BRPM | HEART RATE: 73 BPM | DIASTOLIC BLOOD PRESSURE: 64 MMHG | TEMPERATURE: 98 F

## 2023-01-31 DIAGNOSIS — T83.9XXA: ICD-10-CM

## 2023-01-31 DIAGNOSIS — T83.490A MALFUNCTION OF PENILE PROSTHESIS, INITIAL ENCOUNTER: Primary | ICD-10-CM

## 2023-01-31 LAB
POCT GLUCOSE: 117 MG/DL (ref 70–110)
POCT GLUCOSE: 119 MG/DL (ref 70–110)

## 2023-01-31 PROCEDURE — 25000003 PHARM REV CODE 250: Performed by: UROLOGY

## 2023-01-31 PROCEDURE — 87102 FUNGUS ISOLATION CULTURE: CPT | Mod: 59 | Performed by: UROLOGY

## 2023-01-31 PROCEDURE — 54406 PR REMVL,INFLAT PENILE PROSTH W/O REPLACMT: ICD-10-PCS | Mod: ,,, | Performed by: UROLOGY

## 2023-01-31 PROCEDURE — 87206 SMEAR FLUORESCENT/ACID STAI: CPT | Performed by: UROLOGY

## 2023-01-31 PROCEDURE — 71000044 HC DOSC ROUTINE RECOVERY FIRST HOUR: Performed by: UROLOGY

## 2023-01-31 PROCEDURE — 87076 CULTURE ANAEROBE IDENT EACH: CPT | Performed by: UROLOGY

## 2023-01-31 PROCEDURE — 63600175 PHARM REV CODE 636 W HCPCS: Performed by: ANESTHESIOLOGY

## 2023-01-31 PROCEDURE — 63600175 PHARM REV CODE 636 W HCPCS: Performed by: UROLOGY

## 2023-01-31 PROCEDURE — 36000707: Performed by: UROLOGY

## 2023-01-31 PROCEDURE — 37000009 HC ANESTHESIA EA ADD 15 MINS: Performed by: UROLOGY

## 2023-01-31 PROCEDURE — 71000015 HC POSTOP RECOV 1ST HR: Performed by: UROLOGY

## 2023-01-31 PROCEDURE — 25000003 PHARM REV CODE 250: Performed by: STUDENT IN AN ORGANIZED HEALTH CARE EDUCATION/TRAINING PROGRAM

## 2023-01-31 PROCEDURE — 37000008 HC ANESTHESIA 1ST 15 MINUTES: Performed by: UROLOGY

## 2023-01-31 PROCEDURE — 25000003 PHARM REV CODE 250: Performed by: NURSE ANESTHETIST, CERTIFIED REGISTERED

## 2023-01-31 PROCEDURE — D9220A PRA ANESTHESIA: Mod: ANES,,, | Performed by: ANESTHESIOLOGY

## 2023-01-31 PROCEDURE — 87070 CULTURE OTHR SPECIMN AEROBIC: CPT | Mod: 59 | Performed by: UROLOGY

## 2023-01-31 PROCEDURE — 63600175 PHARM REV CODE 636 W HCPCS

## 2023-01-31 PROCEDURE — 63600175 PHARM REV CODE 636 W HCPCS: Mod: TB,JG | Performed by: NURSE ANESTHETIST, CERTIFIED REGISTERED

## 2023-01-31 PROCEDURE — 87075 CULTR BACTERIA EXCEPT BLOOD: CPT | Performed by: UROLOGY

## 2023-01-31 PROCEDURE — 71000016 HC POSTOP RECOV ADDL HR: Performed by: UROLOGY

## 2023-01-31 PROCEDURE — 54406 REMOVE MUTI-COMP PENIS PROS: CPT | Mod: ,,, | Performed by: UROLOGY

## 2023-01-31 PROCEDURE — D9220A PRA ANESTHESIA: ICD-10-PCS | Mod: CRNA,,, | Performed by: NURSE ANESTHETIST, CERTIFIED REGISTERED

## 2023-01-31 PROCEDURE — D9220A PRA ANESTHESIA: ICD-10-PCS | Mod: ANES,,, | Performed by: ANESTHESIOLOGY

## 2023-01-31 PROCEDURE — 36000706: Performed by: UROLOGY

## 2023-01-31 PROCEDURE — 82962 GLUCOSE BLOOD TEST: CPT | Performed by: UROLOGY

## 2023-01-31 PROCEDURE — D9220A PRA ANESTHESIA: Mod: CRNA,,, | Performed by: NURSE ANESTHETIST, CERTIFIED REGISTERED

## 2023-01-31 PROCEDURE — 87116 MYCOBACTERIA CULTURE: CPT | Performed by: UROLOGY

## 2023-01-31 PROCEDURE — 27201423 OPTIME MED/SURG SUP & DEVICES STERILE SUPPLY: Performed by: UROLOGY

## 2023-01-31 PROCEDURE — 63600175 PHARM REV CODE 636 W HCPCS: Performed by: NURSE ANESTHETIST, CERTIFIED REGISTERED

## 2023-01-31 RX ORDER — FENTANYL CITRATE 50 UG/ML
INJECTION, SOLUTION INTRAMUSCULAR; INTRAVENOUS
Status: DISCONTINUED | OUTPATIENT
Start: 2023-01-31 | End: 2023-01-31

## 2023-01-31 RX ORDER — SODIUM CHLORIDE 9 MG/ML
INJECTION, SOLUTION INTRAVENOUS CONTINUOUS
Status: DISCONTINUED | OUTPATIENT
Start: 2023-01-31 | End: 2023-01-31 | Stop reason: HOSPADM

## 2023-01-31 RX ORDER — ONDANSETRON 2 MG/ML
4 INJECTION INTRAMUSCULAR; INTRAVENOUS DAILY PRN
Status: DISCONTINUED | OUTPATIENT
Start: 2023-01-31 | End: 2023-01-31 | Stop reason: HOSPADM

## 2023-01-31 RX ORDER — SODIUM CHLORIDE 0.9 % (FLUSH) 0.9 %
10 SYRINGE (ML) INJECTION
Status: DISCONTINUED | OUTPATIENT
Start: 2023-01-31 | End: 2023-01-31 | Stop reason: HOSPADM

## 2023-01-31 RX ORDER — CEFAZOLIN SODIUM 1 G/3ML
INJECTION, POWDER, FOR SOLUTION INTRAMUSCULAR; INTRAVENOUS
Status: DISCONTINUED | OUTPATIENT
Start: 2023-01-31 | End: 2023-01-31

## 2023-01-31 RX ORDER — HALOPERIDOL 5 MG/ML
0.5 INJECTION INTRAMUSCULAR EVERY 10 MIN PRN
Status: DISCONTINUED | OUTPATIENT
Start: 2023-01-31 | End: 2023-01-31 | Stop reason: HOSPADM

## 2023-01-31 RX ORDER — ROCURONIUM BROMIDE 10 MG/ML
INJECTION, SOLUTION INTRAVENOUS
Status: DISCONTINUED | OUTPATIENT
Start: 2023-01-31 | End: 2023-01-31

## 2023-01-31 RX ORDER — FENTANYL CITRATE 50 UG/ML
25 INJECTION, SOLUTION INTRAMUSCULAR; INTRAVENOUS EVERY 5 MIN PRN
Status: DISCONTINUED | OUTPATIENT
Start: 2023-01-31 | End: 2023-01-31 | Stop reason: HOSPADM

## 2023-01-31 RX ORDER — FENTANYL CITRATE 50 UG/ML
INJECTION, SOLUTION INTRAMUSCULAR; INTRAVENOUS
Status: COMPLETED
Start: 2023-01-31 | End: 2023-01-31

## 2023-01-31 RX ORDER — PROPOFOL 10 MG/ML
VIAL (ML) INTRAVENOUS
Status: DISCONTINUED | OUTPATIENT
Start: 2023-01-31 | End: 2023-01-31

## 2023-01-31 RX ORDER — LIDOCAINE HYDROCHLORIDE 20 MG/ML
INJECTION INTRAVENOUS
Status: DISCONTINUED | OUTPATIENT
Start: 2023-01-31 | End: 2023-01-31

## 2023-01-31 RX ORDER — OXYCODONE HYDROCHLORIDE 5 MG/1
5 TABLET ORAL EVERY 6 HOURS PRN
Qty: 10 TABLET | Refills: 0 | Status: SHIPPED | OUTPATIENT
Start: 2023-01-31 | End: 2023-07-19 | Stop reason: ALTCHOICE

## 2023-01-31 RX ORDER — PHENYLEPHRINE HCL IN 0.9% NACL 1 MG/10 ML
SYRINGE (ML) INTRAVENOUS
Status: DISCONTINUED | OUTPATIENT
Start: 2023-01-31 | End: 2023-01-31

## 2023-01-31 RX ORDER — LIDOCAINE HYDROCHLORIDE 10 MG/ML
1 INJECTION, SOLUTION EPIDURAL; INFILTRATION; INTRACAUDAL; PERINEURAL ONCE
Status: COMPLETED | OUTPATIENT
Start: 2023-01-31 | End: 2023-01-31

## 2023-01-31 RX ORDER — DEXAMETHASONE SODIUM PHOSPHATE 4 MG/ML
INJECTION, SOLUTION INTRA-ARTICULAR; INTRALESIONAL; INTRAMUSCULAR; INTRAVENOUS; SOFT TISSUE
Status: DISCONTINUED | OUTPATIENT
Start: 2023-01-31 | End: 2023-01-31

## 2023-01-31 RX ORDER — ACETAMINOPHEN 10 MG/ML
INJECTION, SOLUTION INTRAVENOUS
Status: DISCONTINUED | OUTPATIENT
Start: 2023-01-31 | End: 2023-01-31

## 2023-01-31 RX ORDER — ONDANSETRON 2 MG/ML
INJECTION INTRAMUSCULAR; INTRAVENOUS
Status: DISCONTINUED | OUTPATIENT
Start: 2023-01-31 | End: 2023-01-31

## 2023-01-31 RX ADMIN — ONDANSETRON 4 MG: 2 INJECTION INTRAMUSCULAR; INTRAVENOUS at 08:01

## 2023-01-31 RX ADMIN — FENTANYL CITRATE 25 MCG: 50 INJECTION, SOLUTION INTRAMUSCULAR; INTRAVENOUS at 09:01

## 2023-01-31 RX ADMIN — SODIUM CHLORIDE: 9 INJECTION, SOLUTION INTRAVENOUS at 06:01

## 2023-01-31 RX ADMIN — Medication 100 MCG: at 08:01

## 2023-01-31 RX ADMIN — DEXAMETHASONE SODIUM PHOSPHATE 4 MG: 4 INJECTION, SOLUTION INTRAMUSCULAR; INTRAVENOUS at 07:01

## 2023-01-31 RX ADMIN — LIDOCAINE HYDROCHLORIDE 2 MG: 10 INJECTION, SOLUTION EPIDURAL; INFILTRATION; INTRACAUDAL; PERINEURAL at 06:01

## 2023-01-31 RX ADMIN — SUGAMMADEX 200 MG: 100 INJECTION, SOLUTION INTRAVENOUS at 08:01

## 2023-01-31 RX ADMIN — FENTANYL CITRATE 100 MCG: 50 INJECTION, SOLUTION INTRAMUSCULAR; INTRAVENOUS at 07:01

## 2023-01-31 RX ADMIN — ROCURONIUM BROMIDE 50 MG: 10 INJECTION INTRAVENOUS at 07:01

## 2023-01-31 RX ADMIN — LIDOCAINE HYDROCHLORIDE 80 MG: 20 INJECTION INTRAVENOUS at 07:01

## 2023-01-31 RX ADMIN — PROPOFOL 130 MG: 10 INJECTION, EMULSION INTRAVENOUS at 07:01

## 2023-01-31 RX ADMIN — ACETAMINOPHEN 1000 MG: 10 INJECTION INTRAVENOUS at 07:01

## 2023-01-31 RX ADMIN — GLYCOPYRROLATE 0.2 MG: 0.2 INJECTION, SOLUTION INTRAMUSCULAR; INTRAVENOUS at 07:01

## 2023-01-31 RX ADMIN — CEFAZOLIN 2 G: 330 INJECTION, POWDER, FOR SOLUTION INTRAMUSCULAR; INTRAVENOUS at 07:01

## 2023-01-31 NOTE — PATIENT INSTRUCTIONS
Post Surgery Instructions  Remove your dressing tomorrow (2/1/23)  Do not strain to have a bowel movement  No strenuous exercise x14 days  No intercourse for 6 weeks  No driving while you are on narcotic pain medications     You can expect:  Some swelling in your scrotum but significant swelling or pain should be evaluated by an MD or ER  Swelling should resolve in the next few months    You can shower in 2 days    You can place ice on your scrotum for 30 min to 1 hour at a time for swelling  You can also elevate your scrotum under towels to help with swelling when you are sitting    You can wear a jock strap or tight white underwear to help with swelling    Call the doctor if:  Temperature is greater than 101F  Persistent vomiting and inability to keep food down  Inability to pee

## 2023-01-31 NOTE — OP NOTE
[facility]   Operative Note     Date: 01/31/2023    Pre-Op Diagnosis:   Erectile dysfunction  Peyronie's disease  Desire for removal of inflatable penile prosthesis    Patient Active Problem List    Diagnosis Date Noted    Coronary artery disease involving coronary bypass graft of native heart without angina pectoris 01/11/2023    Malfunction of penile prosthesis 09/08/2022    Peyronie's disease 11/08/2021    Erectile dysfunction due to arterial insufficiency 11/08/2021    Abdominal pain 01/18/2019    Cervical radiculopathy 03/27/2018    GERD (gastroesophageal reflux disease) 06/24/2014    Gout 12/02/2013    DM type 2 causing complication 11/21/2013    HTN (hypertension)     Hyperlipidemia LDL goal <70       Post-Op Diagnosis: same     Procedure(s) Performed:   Removal of three-piece IPP    Specimen(s):   Urethral swab culture  2.   Three-piece IPP for culture    Staff Surgeon: Juventino Rodriguez MD    Assistant Surgeon: Kevin Dominguez MD     Anesthesia: General endotracheal anesthesia     Indications: Carolann Waite is a 82 y.o. male with h/o ED and Peyronie's disease s/p IPP placement with modeling in 12/2021. His pump migrated cephalad somewhat and was painful for him. On 9/8/22, he underwent repair of his IPP with relocation of the pump. Now, he c/o penile pain with inflation, pain with intercourse, and insufficient size of the penis and desires complete removal of the device. He presents today for removal of his IPP.    Findings:   1.  Three-piece IPP (bilateral cylinders, pump, reservoir, and tubing) removed without complication.    Estimated Blood Loss: <50cc     Drains: none    Procedure in detail:  After the risks and benefits of the procedure were explained informed consent was obtained. The patient was taken to the operating room and placed under general anesthesia. Pre-operative antibiotics had already been administered. He was placed in a frog-leg position. His genitals were shaved. He  was then prepped with a chloraprep. He was then draped in a sterile fashion with ioban placed across all exposed skin leaving room for the penis only. We then scrubbed our hands.    A 16 Malay mckeon catheter was placed and the bladder was drained.     A transverse incision was marked along the penoscrotal junction and incised sharply with a 15 blade.  A Rafiq retractor was then placed for exposure. The capsule surrounding the pump was entered first.  There was no purulent drainage seen around the pump however the biofilm was disrupted and the wound was considered contaminated. The pump was delivered into the wound. We then followed the tubing from the pump to the left corpora and made a corporotomy using bovie cautery. We followed the corporotomy down to the tubing and identified the cylinder. A right angle was placed around the cylinder to bring it out of the corporotomy. The cylinder was then removed with the rear tip extender. The tubing was amputated and the cylinder was passed off the field.  We repeated this on the contralateral side, removing the remaining cylinder and the pump. During both cylinder explantations, we identified the urethra to ensure it was protected and remained uninjured. We thoroughly irrigated each corpora with a vancomycin/gentamicin solution. We also thoroughly irrigated the site of the former pump with the same solution. We placed 2-0 vicryl sutures on each side of the corporotomies for a total of 4 stitches, which would allow us to close to corporotomies. The pieces of the IPP were passed off the field which included both cylinders and the pump.     We ensured the bladder was empty and we then turned our attention towards the reservoir. We followed the tubing to the reservoir location on the left. The reservoir and remaining tubing were able to be removed completely intact. We then irrigated the former reservoir site with the same vancomycin/gentamicin solution.    Dari allan  reapproximated in three separate suture lines using 2-0 Vicryl in a running fashion. Skin was then reapproximated using 3-0 chromic in a running horizontal mattress fashion.  Dermabond was applied and a mummy wrap was performed using coban and kerlix with scrotal fluffs and mesh panties. The patient's Merino catheter was removed.    Once done, the patient was awakened from anesthesia and then transferred to the PACU in stbale condition.     Disposition:  The patient will be discharged home from PACU. He was given detailed post-operative wound care instructions. He will undergo a voiding trial in post-op and he was instructed to self-remove his mummy wrap tomorrow morning.

## 2023-01-31 NOTE — INTERVAL H&P NOTE
The patient has been examined and the H&P has been reviewed:    I concur with the findings and no changes have occurred since H&P was written.    Surgery risks, benefits and alternative options discussed and understood by patient/family.    To OR today for removal of penile implant. Patient denies taking aspirin/NSAID products over the past 7 days.      Active Hospital Problems    Diagnosis  POA    *Malfunction of penile prosthesis [T83.490A]  Yes    Coronary artery disease involving coronary bypass graft of native heart without angina pectoris [I25.810]  Yes    Peyronie's disease [N48.6]  Yes    Erectile dysfunction due to arterial insufficiency [N52.01]  Yes    Cervical radiculopathy [M54.12]  Yes    GERD (gastroesophageal reflux disease) [K21.9]  Yes    Gout [M10.9]  Yes    DM type 2 causing complication [E11.8]  Yes    HTN (hypertension) [I10]  Yes    Hyperlipidemia LDL goal <70 [E78.5]  Yes      Resolved Hospital Problems   No resolved problems to display.

## 2023-01-31 NOTE — TRANSFER OF CARE
"Anesthesia Transfer of Care Note    Patient: Carolann R Waite    Procedure(s) Performed: Procedure(s) (LRB):  REMOVAL, PENILE IMPLANT (N/A)  WASHOUT    Patient location: PACU    Anesthesia Type: general    Transport from OR: Transported from OR on 6-10 L/min O2 by face mask with adequate spontaneous ventilation    Post pain: adequate analgesia    Post assessment: no apparent anesthetic complications    Post vital signs: stable    Level of consciousness: awake    Nausea/Vomiting: no nausea/vomiting    Complications: none    Transfer of care protocol was followed      Last vitals:   Visit Vitals  BP (!) 177/84 (BP Location: Left arm, Patient Position: Lying)   Pulse 82   Temp 36.8 °C (98.3 °F) (Oral)   Resp 18   Ht 6' 2" (1.88 m)   Wt 74.8 kg (165 lb)   SpO2 98%   BMI 21.18 kg/m²     "

## 2023-01-31 NOTE — ANESTHESIA PREPROCEDURE EVALUATION
01/31/2023   Procedure Summary    Case: [id number] Date/Time: 01/31/23 0700   Procedure: REMOVAL, PENILE IMPLANT (Perineum) - 1.5 hr   Anesthesia type: General   Diagnosis: Malfunction of penile prosthesis, initial encounter [T83.262V]       Carolann Waite is a 82 y.o. male w a pmhx of CAD s/p CABG x 4 (3/20/2022), HTN, HLD, and Type 2 DM.     Patient Active Problem List   Diagnosis    HTN (hypertension)    Hyperlipidemia LDL goal <70    DM type 2 causing complication    Gout    GERD (gastroesophageal reflux disease)    Cervical radiculopathy    Abdominal pain    Peyronie's disease    Erectile dysfunction due to arterial insufficiency    Malfunction of penile prosthesis    Coronary artery disease involving coronary bypass graft of native heart without angina pectoris       Review of patient's allergies indicates:   Allergen Reactions    Hydrocodone Itching       Current Facility-Administered Medications on File Prior to Visit   Medication Dose Route Frequency Provider Last Rate Last Admin    ceFAZolin 2 g in dextrose 5 % in water (D5W) 5 % 50 mL IVPB (MB+)  2 g Intravenous On Call Procedure Cameron Simpson MD        LIDOcaine (PF) 10 mg/ml (1%) injection 10 mg  1 mL Intradermal Once Cameron Simpson MD         Current Outpatient Medications on File Prior to Visit   Medication Sig Dispense Refill    acetaminophen (TYLENOL) 325 MG tablet Take 2 tablets (650 mg total) by mouth every 6 (six) hours as needed for Pain.  0    amLODIPine (NORVASC) 10 MG tablet TAKE 1 TABLET EVERY DAY (Patient taking differently: Take by mouth every morning.) 90 tablet 0    atorvastatin (LIPITOR) 20 MG tablet TAKE 1 TABLET EVERY DAY (Patient taking differently: Take by mouth every morning.) 90 tablet 2    blood sugar diagnostic (ACCU-CHEK MILENA PLUS TEST STRP MISC) 1 strip by Misc.(Non-Drug; Combo Route) route 2 (two)  times a day.      cephALEXin (KEFLEX) 500 MG capsule Take 1 capsule by mouth 2 (two) times daily.      diclofenac sodium (VOLTAREN) 1 % Gel Apply 2 g topically 3 (three) times daily. Apply to affected area TID x 7 days then PRN for 7 days 1 each 1    etodolac (LODINE) 200 MG Cap Take 1 capsule (200 mg total) by mouth every 8 (eight) hours as needed. To use for gout attack related pain instead of indocin. 90 capsule 1    Lactobacillus acidophilus (PROBIOTIC ORAL) Take 1 tablet by mouth every morning.      losartan (COZAAR) 100 MG tablet Take 50 mg by mouth every morning.      mupirocin (BACTROBAN) 2 % ointment Apply topically 2 (two) times daily as needed (sores).      pantoprazole (PROTONIX) 40 MG tablet TAKE 1 TABLET EVERY DAY (Patient taking differently: Take by mouth every morning.) 90 tablet 0    polyethylene glycol (GLYCOLAX) 17 gram/dose powder Mix 1 capful (17 g) with a liquid and take by mouth once daily. 238 g 0    RESTASIS 0.05 % ophthalmic emulsion Place 1 drop into both eyes 2 (two) times daily.      SITagliptin (JANUVIA) 100 MG Tab Take 50 mg by mouth every morning.      tamsulosin (FLOMAX) 0.4 mg Cap Take 1 capsule (0.4 mg total) by mouth once daily. (Patient taking differently: Take 0.4 mg by mouth every morning.) 14 capsule 0    triamcinolone acetonide 0.1% (KENALOG) 0.1 % cream Apply topically daily as needed (itch).      vitamin D (VITAMIN D3) 1000 units Tab Take 1,000 Units by mouth every morning.         Past Surgical History:   Procedure Laterality Date    cancer of skin (nose)  02/2014    basal cell ca    CIRCUMCISION, PRIMARY      COLONOSCOPY  7/31/2009  [facility]    Normal colon and TI.    COLONOSCOPY  10/08/2014    Dr. Ricci, repeat in 6-7 years    COLONOSCOPY N/A 01/18/2019    Procedure: COLONOSCOPY;  Surgeon: Ron Ricci Jr., MD;  Location: [facility];  Service: Endoscopy;  Laterality: N/A;    COLONOSCOPY W/ POLYPECTOMY  8/7/2006  [facility]    Three cecal polyps, largest 4mm  x 12mm.  TUBULAR ADENOMAS.  One 2 mm in the hepatic flexure.  TUBULAR ADENOMAS.    CORONARY ARTERY BYPASS GRAFT (CABG) N/A 3/20/2022    Procedure: CABG W/ LIMA X 4 VESSELS;  Surgeon: Catarino Banuelos MD;  Location: [facility] OR;  Service: Cardiovascular;  Laterality: N/A;    ENDOSCOPIC HARVEST OF VEIN Left 3/20/2022    Procedure: SURGICAL PROCUREMENT, VEIN, ENDOSCOPIC;  Surgeon: Catarino Banuelos MD;  Location: [facility] OR;  Service: Cardiovascular;  Laterality: Left;    EPIDURAL STEROID INJECTION INTO CERVICAL SPINE N/A 08/15/2018    Procedure: Injection-steroid-epidural-cervical;  Surgeon: Keny Ibanez MD;  Location: [facility] OR;  Service: Pain Management;  Laterality: N/A;  to left    FLEXIBLE SIGMOIDOSCOPY  ~2001    INSERTION OF INFLATABLE PENILE PROSTHESIS N/A 2021    Procedure: INSERTION, PENILE PROSTHESIS, INFLATABLE;  Surgeon: Juventino Rodriguez MD;  [facility];  Service: Urology;  Laterality: N/A;  1.5hr    LEFT HEART CATHETERIZATION N/A 3/15/2022    Procedure: Left heart cath;  Surgeon: Kulwinder Francis III, MD;  Location: [facility] CATH;  Service: Cardiology;  Laterality: N/A;    RECONSTRUCTION OF PENIS N/A 2021    Procedure: RECONSTRUCTION, PENIS remodeling;  Surgeon: Juventino Rodriguez MD;  Location: [facility] OR 2ND FLR;  Service: Urology;  Laterality: N/A;  1.5hr    removal of lipoma      R arm    removal of melanoma      L arm    TONSILLECTOMY      UPPER GASTROINTESTINAL ENDOSCOPY  in his 30's    normal findings per patient report       Social History     Socioeconomic History    Marital status:    Tobacco Use    Smoking status: Former     Types: Cigars     Quit date: 3/20/2022     Years since quittin.8    Smokeless tobacco: Never    Tobacco comments:     smokes a cigar on occasion   Substance and Sexual Activity    Alcohol use: Not Currently     Comment: 1 drink per month    Drug use: No    Sexual activity: Yes     Partners: Female         CBC:  No results for input(s): WBC, RBC, HGB, HCT, PLT, MCV, MCH, MCHC in the last 72 hours.    CMP: No results for input(s): NA, K, CL, CO2, BUN, CREATININE, GLU, MG, PHOS, CALCIUM, ALBUMIN, PROT, ALKPHOS, ALT, AST, BILITOT in the last 72 hours.    INR  No results for input(s): PT, INR, PROTIME, APTT in the last 72 hours.        Diagnostic Studies:      EKD Echo:  Results for orders placed or performed in visit on 18   2D echo with color flow doppler   Result Value Ref Range    EF + QEF 55 55 - 65    Diastolic Dysfunction No     Est. PA Systolic Pressure 14.44          Pre-op Assessment    I have reviewed the Patient Summary Reports.     I have reviewed the Nursing Notes. I have reviewed the NPO Status.      Review of Systems  Anesthesia Hx:  No problems with previous Anesthesia  Denies Family Hx of Anesthesia complications.   Denies Personal Hx of Anesthesia complications.   Social:  Former Smoker, No Alcohol Use    Cardiovascular:   Exercise tolerance: good Hypertension CAD      Hepatic/GI:   GERD    Neurological:  Neurology Normal    Endocrine:   Diabetes        Physical Exam  General: Well nourished, Cooperative, Alert and Oriented    Airway:  Mallampati: II / I  Mouth Opening: Normal  TM Distance: Normal  Neck ROM: Normal ROM    Dental:  Intact    Chest/Lungs:  Clear to auscultation, Normal Respiratory Rate    Heart:  Rate: Normal  Rhythm: Regular Rhythm        Anesthesia Plan  Type of Anesthesia, risks & benefits discussed:    Anesthesia Type: Gen ETT  Intra-op Monitoring Plan: Standard ASA Monitors  Post Op Pain Control Plan: multimodal analgesia  Induction:  IV  Airway Plan: Direct  ASA Score: 3  Day of Surgery Review of History & Physical: H&P Update referred to the surgeon/provider.    Ready For Surgery From Anesthesia Perspective.     .

## 2023-01-31 NOTE — PROGRESS NOTES
Pt states he has a popped blood blister on his scrotum. Pt is here for penile implant removal today. MD called to assess prior to PIV placement. Pre-op otherwise complete.

## 2023-01-31 NOTE — ANESTHESIA PROCEDURE NOTES
Intubation    Date/Time: 1/31/2023 7:12 AM  Performed by: Glenda Blair CRNA  Authorized by: Melissa Rey MD     Intubation:     Induction:  Intravenous    Intubated:  Postinduction    Mask Ventilation:  Easy with oral airway    Attempts:  1    Attempted By:  CRNA    Method of Intubation:  Direct    Blade:  Arielle 3    Laryngeal View Grade: Grade IIA - cords partially seen      Difficult Airway Encountered?: No      Complications:  None    Airway Device:  Oral endotracheal tube    Airway Device Size:  7.5    Style/Cuff Inflation:  Cuffed (inflated to minimal occlusive pressure)    Tube secured:  24    Secured at:  The lips    Placement Verified By:  Capnometry    Complicating Factors:  None    Findings Post-Intubation:  BS equal bilateral and atraumatic/condition of teeth unchanged

## 2023-01-31 NOTE — BRIEF OP NOTE
[facility] - Surgery ([facility])  Brief Operative Note    SUMMARY     Surgery Date: 1/31/2023     Surgeon(s) and Role:     * Juventino Rodriguez MD - Primary     * Kevin Dominguez MD - Resident - Chief    Assisting Surgeon: None    Pre-op Diagnosis:  Malfunction of penile prosthesis, initial encounter [T83.490A]    Post-op Diagnosis:  Post-Op Diagnosis Codes:     * Malfunction of penile prosthesis, initial encounter [T83.490A]    Procedure Performed:   Procedure(s) (LRB):  REMOVAL, PENILE IMPLANT (N/A)  WASHOUT    Anesthesia: General    Findings:   Three-piece IPP (bilateral cylinders, pump, reservoir, and tubing) removed without complication.    Estimated Blood Loss: min         Specimens:   Urethral swab culture  IPP for culture

## 2023-01-31 NOTE — DISCHARGE SUMMARY
[facility]  Discharge Note  Short Stay    Admit Date: 1/31/2023    Discharge Date and Time: 01/31/2023 8:30 AM      Attending Physician: Juventino Rodriguez MD     Discharge Provider: Kevin Dominguez MD    Diagnoses:  Active Hospital Problems    Diagnosis  POA    *Malfunction of penile prosthesis [T83.490A]  Yes    Coronary artery disease involving coronary bypass graft of native heart without angina pectoris [I25.810]  Yes    Peyronie's disease [N48.6]  Yes    Erectile dysfunction due to arterial insufficiency [N52.01]  Yes    Cervical radiculopathy [M54.12]  Yes    GERD (gastroesophageal reflux disease) [K21.9]  Yes    Gout [M10.9]  Yes    DM type 2 causing complication [E11.8]  Yes    HTN (hypertension) [I10]  Yes    Hyperlipidemia LDL goal <70 [E78.5]  Yes      Resolved Hospital Problems   No resolved problems to display.       Discharged Condition: good    Hospital Course: Patient was admitted for removal of three-piece IPP and tolerated the procedure well with no complications. The patient was discharged home in good condition on the same day.       Final Diagnoses: Same as principal problem.    Disposition: Home or Self Care    Follow up/Patient Instructions:    Medications:  Reconciled Home Medications:   Current Discharge Medication List        START taking these medications    Details   oxyCODONE (ROXICODONE) 5 MG immediate release tablet Take 1 tablet (5 mg total) by mouth every 6 (six) hours as needed for Pain.  Qty: 10 tablet, Refills: 0           CONTINUE these medications which have NOT CHANGED    Details   atorvastatin (LIPITOR) 20 MG tablet TAKE 1 TABLET EVERY DAY  Qty: 90 tablet, Refills: 2      cephALEXin (KEFLEX) 500 MG capsule Take 1 capsule by mouth 2 (two) times daily.      etodolac (LODINE) 200 MG Cap Take 1 capsule (200 mg total) by mouth every 8 (eight) hours as needed. To use for gout attack related pain instead of indocin.  Qty: 90 capsule, Refills: 1      Lactobacillus  acidophilus (PROBIOTIC ORAL) Take 1 tablet by mouth every morning.      losartan (COZAAR) 100 MG tablet Take 50 mg by mouth every morning.      pantoprazole (PROTONIX) 40 MG tablet TAKE 1 TABLET EVERY DAY  Qty: 90 tablet, Refills: 0    Associated Diagnoses: Gastroesophageal reflux disease, esophagitis presence not specified      polyethylene glycol (GLYCOLAX) 17 gram/dose powder Mix 1 capful (17 g) with a liquid and take by mouth once daily.  Qty: 238 g, Refills: 0      RESTASIS 0.05 % ophthalmic emulsion Place 1 drop into both eyes 2 (two) times daily.      SITagliptin (JANUVIA) 100 MG Tab Take 50 mg by mouth every morning.      tamsulosin (FLOMAX) 0.4 mg Cap Take 1 capsule (0.4 mg total) by mouth once daily.  Qty: 14 capsule, Refills: 0      vitamin D (VITAMIN D3) 1000 units Tab Take 1,000 Units by mouth every morning.      acetaminophen (TYLENOL) 325 MG tablet Take 2 tablets (650 mg total) by mouth every 6 (six) hours as needed for Pain.  Refills: 0      amLODIPine (NORVASC) 10 MG tablet TAKE 1 TABLET EVERY DAY  Qty: 90 tablet, Refills: 0      blood sugar diagnostic (ACCU-CHEK MILENA PLUS TEST STRP MISC) 1 strip by Misc.(Non-Drug; Combo Route) route 2 (two) times a day.      diclofenac sodium (VOLTAREN) 1 % Gel Apply 2 g topically 3 (three) times daily. Apply to affected area TID x 7 days then PRN for 7 days  Qty: 1 each, Refills: 1    Associated Diagnoses: S/P insertion of penile implant; Pain in left testicle; Post-operative state      mupirocin (BACTROBAN) 2 % ointment Apply topically 2 (two) times daily as needed (sores).      triamcinolone acetonide 0.1% (KENALOG) 0.1 % cream Apply topically daily as needed (itch).           Discharge Procedure Orders   Diet Adult Regular     No driving until:   Order Comments: Off narcotics     Notify your health care provider if you experience any of the following:  temperature >100.4     Notify your health care provider if you experience any of the following:  persistent  nausea and vomiting or diarrhea     Notify your health care provider if you experience any of the following:  severe uncontrolled pain     Notify your health care provider if you experience any of the following:  difficulty breathing or increased cough     Notify your health care provider if you experience any of the following:  severe persistent headache     Notify your health care provider if you experience any of the following:  worsening rash     Notify your health care provider if you experience any of the following:  persistent dizziness, light-headedness, or visual disturbances     Notify your health care provider if you experience any of the following:  increased confusion or weakness     Notify your health care provider if you experience any of the following:  redness, tenderness, or signs of infection (pain, swelling, redness, odor or green/yellow discharge around incision site)     Activity as tolerated      Follow-up Information       Juventino Rodriguez MD Follow up on 2/27/2023.    Specialty: Urology  Why: Post-op follow up  Contact information:  [address]  [facility] [address]  [phone]

## 2023-01-31 NOTE — ANESTHESIA POSTPROCEDURE EVALUATION
Anesthesia Post Evaluation    Patient: Carolann R Waite    Procedure(s) Performed: Procedure(s) (LRB):  REMOVAL, PENILE IMPLANT (N/A)  WASHOUT    Final Anesthesia Type: general      Patient location during evaluation: PACU  Patient participation: Yes- Able to Participate  Level of consciousness: awake and alert and oriented  Post-procedure vital signs: reviewed and stable  Pain management: adequate  Airway patency: patent    PONV status at discharge: No PONV  Anesthetic complications: no      Cardiovascular status: hemodynamically stable  Respiratory status: nasal cannula  Hydration status: euvolemic  Follow-up not needed.          Vitals Value Taken Time   /64 01/31/23 1031   Temp 36.5 °C (97.7 °F) 01/31/23 1030   Pulse 77 01/31/23 1043   Resp 22 01/31/23 1041   SpO2 95 % 01/31/23 1043   Vitals shown include unvalidated device data.      No case tracking events are documented in the log.      Pain/Prudence Score: Pain Rating Prior to Med Admin: 7 (1/31/2023  9:21 AM)  Pain Rating Post Med Admin: 3 (1/31/2023 10:50 AM)  Prudence Score: 9 (1/31/2023  9:15 AM)

## 2023-02-03 LAB
BACTERIA SPEC AEROBE CULT: NO GROWTH
BACTERIA SPEC AEROBE CULT: NO GROWTH

## 2023-02-06 LAB — BACTERIA SPEC ANAEROBE CULT: ABNORMAL

## 2023-02-08 LAB — BACTERIA SPEC ANAEROBE CULT: NORMAL

## 2023-02-27 ENCOUNTER — OFFICE VISIT (OUTPATIENT)
Dept: UROLOGY | Facility: CLINIC | Age: 83
End: 2023-02-27
Payer: MEDICARE

## 2023-02-27 VITALS
BODY MASS INDEX: 21.17 KG/M2 | HEART RATE: 65 BPM | SYSTOLIC BLOOD PRESSURE: 135 MMHG | DIASTOLIC BLOOD PRESSURE: 71 MMHG | WEIGHT: 165 LBS | HEIGHT: 74 IN

## 2023-02-27 DIAGNOSIS — T83.490D MALFUNCTION OF PENILE PROSTHESIS, SUBSEQUENT ENCOUNTER: Primary | ICD-10-CM

## 2023-02-27 PROBLEM — T83.490A MALFUNCTION OF PENILE PROSTHESIS: Status: RESOLVED | Noted: 2022-09-08 | Resolved: 2023-02-27

## 2023-02-27 PROCEDURE — 99999 PR PBB SHADOW E&M-EST. PATIENT-LVL IV: ICD-10-PCS | Mod: PBBFAC,,, | Performed by: UROLOGY

## 2023-02-27 PROCEDURE — 3078F DIAST BP <80 MM HG: CPT | Mod: CPTII,S$GLB,, | Performed by: UROLOGY

## 2023-02-27 PROCEDURE — 1160F PR REVIEW ALL MEDS BY PRESCRIBER/CLIN PHARMACIST DOCUMENTED: ICD-10-PCS | Mod: CPTII,S$GLB,, | Performed by: UROLOGY

## 2023-02-27 PROCEDURE — 99024 POSTOP FOLLOW-UP VISIT: CPT | Mod: S$GLB,,, | Performed by: UROLOGY

## 2023-02-27 PROCEDURE — 1126F PR PAIN SEVERITY QUANTIFIED, NO PAIN PRESENT: ICD-10-PCS | Mod: CPTII,S$GLB,, | Performed by: UROLOGY

## 2023-02-27 PROCEDURE — 3075F SYST BP GE 130 - 139MM HG: CPT | Mod: CPTII,S$GLB,, | Performed by: UROLOGY

## 2023-02-27 PROCEDURE — 3075F PR MOST RECENT SYSTOLIC BLOOD PRESS GE 130-139MM HG: ICD-10-PCS | Mod: CPTII,S$GLB,, | Performed by: UROLOGY

## 2023-02-27 PROCEDURE — 3288F PR FALLS RISK ASSESSMENT DOCUMENTED: ICD-10-PCS | Mod: CPTII,S$GLB,, | Performed by: UROLOGY

## 2023-02-27 PROCEDURE — 1101F PR PT FALLS ASSESS DOC 0-1 FALLS W/OUT INJ PAST YR: ICD-10-PCS | Mod: CPTII,S$GLB,, | Performed by: UROLOGY

## 2023-02-27 PROCEDURE — 99024 PR POST-OP FOLLOW-UP VISIT: ICD-10-PCS | Mod: S$GLB,,, | Performed by: UROLOGY

## 2023-02-27 PROCEDURE — 1126F AMNT PAIN NOTED NONE PRSNT: CPT | Mod: CPTII,S$GLB,, | Performed by: UROLOGY

## 2023-02-27 PROCEDURE — 99999 PR PBB SHADOW E&M-EST. PATIENT-LVL IV: CPT | Mod: PBBFAC,,, | Performed by: UROLOGY

## 2023-02-27 PROCEDURE — 1101F PT FALLS ASSESS-DOCD LE1/YR: CPT | Mod: CPTII,S$GLB,, | Performed by: UROLOGY

## 2023-02-27 PROCEDURE — 1160F RVW MEDS BY RX/DR IN RCRD: CPT | Mod: CPTII,S$GLB,, | Performed by: UROLOGY

## 2023-02-27 PROCEDURE — 1159F MED LIST DOCD IN RCRD: CPT | Mod: CPTII,S$GLB,, | Performed by: UROLOGY

## 2023-02-27 PROCEDURE — 3078F PR MOST RECENT DIASTOLIC BLOOD PRESSURE < 80 MM HG: ICD-10-PCS | Mod: CPTII,S$GLB,, | Performed by: UROLOGY

## 2023-02-27 PROCEDURE — 1159F PR MEDICATION LIST DOCUMENTED IN MEDICAL RECORD: ICD-10-PCS | Mod: CPTII,S$GLB,, | Performed by: UROLOGY

## 2023-02-27 PROCEDURE — 3288F FALL RISK ASSESSMENT DOCD: CPT | Mod: CPTII,S$GLB,, | Performed by: UROLOGY

## 2023-02-27 RX ORDER — LANCETS
EACH MISCELLANEOUS
COMMUNITY
Start: 2023-02-20

## 2023-02-27 RX ORDER — ISOPROPYL ALCOHOL 70 ML/100ML
1 SWAB TOPICAL
COMMUNITY
Start: 2023-02-14

## 2023-02-27 RX ORDER — BLOOD GLUCOSE CONTROL HIGH,LOW
EACH MISCELLANEOUS
COMMUNITY
Start: 2023-02-20

## 2023-02-27 RX ORDER — BLOOD-GLUCOSE METER
EACH MISCELLANEOUS
COMMUNITY
Start: 2023-02-17

## 2023-02-27 NOTE — PROGRESS NOTES
CHIEF COMPLAINT:    Mr. Waite is a 82 y.o. male presenting with peyronie's.    PRESENTING ILLNESS:    Carolann Waite is a 82 y.o. male who c/o peyronie's disease and severe ED.  He's s/p placement of a 3 piece AMS IPP with modeling on 12/14/21.  His pump migrated cephalad somewhat and was painful for him.  On 9/8/22, he underwent repair of his IPP with relocation of the pump.  He presents today c/o penile pain with inflation, pain with intercourse, and insufficient size of the penis.  Says the pain can be a 7/10 in intensity when he attempts intercourse.  Because of this, he underwent explant on 1/31/23.    REVIEW OF SYSTEMS:    Carolann Waite denies headache, blurred vision, fever, nausea, vomiting, chills, abdominal pain, chest pain, sore throat, bleeding per rectum, cough, SOB, recent loss of consciousness, recent mental status changes, seizures, dizziness, or upper or lower extremity weakness.    LUIS MANUEL  1. 1  2. 0  3. 0  4. 0  5. 0     PATIENT HISTORY:    Past Medical History:   Diagnosis Date    Arthritis     Cataract     OU    Colon polyps     Diabetes     Diabetes mellitus, type 2     Diverticulosis     Gout     History of colon polyps 10/8/2014    HTN (hypertension)     Hyperlipidemia LDL goal <100     Impotence     Melanoma     Left forearm, s/p excision    S/P CABG x 4     Squamous cell carcinoma     Head/nose       Past Surgical History:   Procedure Laterality Date    cancer of skin (nose)  02/2014    basal cell ca    CIRCUMCISION, PRIMARY      COLONOSCOPY  7/31/2009  Radhames    Normal colon and TI.    COLONOSCOPY  10/08/2014    Dr. Ricci, repeat in 6-7 years    COLONOSCOPY N/A 01/18/2019    Procedure: COLONOSCOPY;  Surgeon: Ron Ricci Jr., MD;  Location: [facility];  Service: Endoscopy;  Laterality: N/A;    COLONOSCOPY W/ POLYPECTOMY  8/7/2006  Radhames    Three cecal polyps, largest 4mm x 12mm.  TUBULAR ADENOMAS.  One 2 mm in the hepatic flexure.  TUBULAR ADENOMAS.    CORONARY ARTERY BYPASS GRAFT (CABG) N/A  3/20/2022    Procedure: CABG W/ LIMA X 4 VESSELS;  Surgeon: Catarino Banuelos MD;  Location: [facility] OR;  Service: Cardiovascular;  Laterality: N/A;    ENDOSCOPIC HARVEST OF VEIN Left 3/20/2022    Procedure: SURGICAL PROCUREMENT, VEIN, ENDOSCOPIC;  Surgeon: Catarino Banuelos MD;  Location: [facility] OR;  Service: Cardiovascular;  Laterality: Left;    EPIDURAL STEROID INJECTION INTO CERVICAL SPINE N/A 08/15/2018    Procedure: Injection-steroid-epidural-cervical;  Surgeon: Keny Ibanez MD;  Location: [facility] OR;  Service: Pain Management;  Laterality: N/A;  to left    FLEXIBLE SIGMOIDOSCOPY  ~2001 [place]    INSERTION OF INFLATABLE PENILE PROSTHESIS N/A 12/14/2021    Procedure: INSERTION, PENILE PROSTHESIS, INFLATABLE;  Surgeon: Juventino Rodriguez MD;  [facility];  Service: Urology;  Laterality: N/A;  1.5hr    LEFT HEART CATHETERIZATION N/A 3/15/2022    Procedure: Left heart cath;  Surgeon: Kulwinder Francis III, MD;  Location: [facility] CATH;  Service: Cardiology;  Laterality: N/A;    RECONSTRUCTION OF PENIS N/A 12/14/2021    Procedure: RECONSTRUCTION, PENIS remodeling;  Surgeon: Juventino Rodriguez MD;  [facility];  Service: Urology;  Laterality: N/A;  1.5hr    removal of lipoma      R arm    removal of melanoma  2009    L arm    REMOVAL OF PENILE IMPLANT N/A 1/31/2023    Procedure: REMOVAL, PENILE IMPLANT;  Surgeon: Juventino Rodriguez MD;  [facility];  Service: Urology;  Laterality: N/A;  1.5 hr    TONSILLECTOMY      UPPER GASTROINTESTINAL ENDOSCOPY  in his 30's    normal findings per patient report    WASHOUT  1/31/2023    Procedure: WASHOUT;  Surgeon: Juventino Rodriguez MD;  [facility];  Service: Urology;;       Family History   Problem Relation Age of Onset    Diabetes Mother     Cancer Father         lymphoma    Hypertension Father     Nephrolithiasis Father     Heart disease Brother 56        mi    Cancer Brother         Prostate    Heart attack Brother     Colon  cancer Neg Hx     Colon polyps Neg Hx     Crohn's disease Neg Hx     Ulcerative colitis Neg Hx     Stomach cancer Neg Hx     Esophageal cancer Neg Hx     Anesthesia problems Neg Hx        Social History     Socioeconomic History    Marital status:    Tobacco Use    Smoking status: Former     Types: Cigars     Quit date: 3/20/2022     Years since quittin.9    Smokeless tobacco: Never    Tobacco comments:     smokes a cigar on occasion   Substance and Sexual Activity    Alcohol use: Not Currently     Comment: 1 drink per month    Drug use: No    Sexual activity: Yes     Partners: Female       Allergies:  Hydrocodone    Medications:    Current Outpatient Medications:     ACCU-CHEK GUIDE GLUCOSE METER [facility] [place] , Disp: , Rfl:     ACCU-CHEK GUIDE L1-L2 CTRL SOL Soln, , Disp: , Rfl:     ACCU-CHEK SOFTCLIX LANCETS Misc, Apply topically., Disp: , Rfl:     acetaminophen (TYLENOL) 325 MG tablet, Take 2 tablets (650 mg total) by mouth every 6 (six) hours as needed for Pain., Disp: , Rfl: 0    amLODIPine (NORVASC) 10 MG tablet, TAKE 1 TABLET EVERY DAY (Patient taking differently: Take by mouth every morning.), Disp: 90 tablet, Rfl: 0    atorvastatin (LIPITOR) 20 MG tablet, TAKE 1 TABLET EVERY DAY (Patient taking differently: Take by mouth every morning.), Disp: 90 tablet, Rfl: 2    blood sugar diagnostic (ACCU-CHEK MILENA PLUS TEST STRP MISC), 1 strip by Misc.(Non-Drug; Combo Route) route 2 (two) times a day., Disp: , Rfl:     cephALEXin (KEFLEX) 500 MG capsule, Take 1 capsule by mouth 2 (two) times daily., Disp: , Rfl:     DROPSAFE ALCOHOL PREP PADS PadM, Apply 1 each topically as needed., Disp: , Rfl:     etodolac (LODINE) 200 MG Cap, Take 1 capsule (200 mg total) by mouth every 8 (eight) hours as needed. To use for gout attack related pain instead of indocin., Disp: 90 capsule, Rfl: 1    Lactobacillus acidophilus (PROBIOTIC ORAL), Take 1 tablet by mouth every morning., Disp: , Rfl:     losartan (COZAAR) 100 MG tablet,  Take 50 mg by mouth every morning., Disp: , Rfl:     mupirocin (BACTROBAN) 2 % ointment, Apply topically 2 (two) times daily as needed (sores)., Disp: , Rfl:     pantoprazole (PROTONIX) 40 MG tablet, TAKE 1 TABLET EVERY DAY (Patient taking differently: Take by mouth every morning.), Disp: 90 tablet, Rfl: 0    polyethylene glycol (GLYCOLAX) 17 gram/dose powder, Mix 1 capful (17 g) with a liquid and take by mouth once daily., Disp: 238 g, Rfl: 0    RESTASIS 0.05 % ophthalmic emulsion, Place 1 drop into both eyes 2 (two) times daily., Disp: , Rfl:     SITagliptin (JANUVIA) 100 MG Tab, Take 50 mg by mouth every morning., Disp: , Rfl:     triamcinolone acetonide 0.1% (KENALOG) 0.1 % cream, Apply topically daily as needed (itch)., Disp: , Rfl:     vitamin D (VITAMIN D3) 1000 units Tab, Take 1,000 Units by mouth every morning., Disp: , Rfl:     diclofenac sodium (VOLTAREN) 1 % Gel, Apply 2 g topically 3 (three) times daily. Apply to affected area TID x 7 days then PRN for 7 days, Disp: 1 each, Rfl: 1    oxyCODONE (ROXICODONE) 5 MG immediate release tablet, Take 1 tablet (5 mg total) by mouth every 6 (six) hours as needed for Pain. (Patient not taking: Reported on 2/27/2023), Disp: 10 tablet, Rfl: 0    tamsulosin (FLOMAX) 0.4 mg Cap, Take 1 capsule (0.4 mg total) by mouth once daily. (Patient not taking: Reported on 2/27/2023), Disp: 14 capsule, Rfl: 0  No current facility-administered medications for this visit.    Facility-Administered Medications Ordered in Other Visits:     ceFAZolin 2 g in dextrose 5 % in water (D5W) 5 % 50 mL IVPB (MB+), 2 g, Intravenous, On Call Procedure, Cameron Simpson MD    PHYSICAL EXAMINATION:    The patient generally appears in good health, is appropriately interactive, and is in no apparent distress.     Eyes: anicteric sclerae, moist conjunctivae; no lid-lag; PERRLA     HENT: Atraumatic; oropharynx clear with moist mucous membranes and no mucosal ulcerations;normal hard and soft palate.  No  evidence of lymphadenopathy.    Neck: Trachea midline.  No thyromegaly.    Skin: No lesions.    Mental: Cooperative with normal affect.  Is oriented to time, place, and person.    Neuro: Grossly intact.    Chest: Normal inspiratory effort.   No accessory muscles.  No audible wheezes.  Respirations symmetric on inspiration and expiration.    Heart: Regular rhythm.      Abdomen:  Soft, non-tender. No masses or organomegaly. Bladder is not palpable. No evidence of flank discomfort. No evidence of inguinal hernia.    Genitourinary: The penis is circumcised with a plaque c/w peyronie's on the shaft. The urethral meatus is normal. The testes, epididymides, and cord structures are normal in size and contour bilaterally.     Extremities: No clubbing, cyanosis, or edema    LABS:    UA dipped negative today  Lab Results   Component Value Date    PSA 2.6 08/23/2021    PSA 3.7 11/06/2017    PSA 1.6 05/17/2017    PSADIAG 2.1 08/22/2018       IMPRESSION:    Encounter Diagnoses   Name Primary?    Malfunction of penile prosthesis, subsequent encounter Yes   HTN, stable  Hyperlipidemia, stable  DM, stable      PLAN:    1. Discussed options for his severe ED (affected by above comorbidities).  Will observe it.  2. RTC 1 year to see an CHASIDY.      Copy to:

## 2023-03-01 LAB
FUNGUS SPEC CULT: NORMAL
FUNGUS SPEC CULT: NORMAL

## 2023-03-21 LAB
ACID FAST MOD KINY STN SPEC: NORMAL
ACID FAST MOD KINY STN SPEC: NORMAL
MYCOBACTERIUM SPEC QL CULT: NORMAL
MYCOBACTERIUM SPEC QL CULT: NORMAL

## 2023-05-24 ENCOUNTER — TELEPHONE (OUTPATIENT)
Dept: UROLOGY | Facility: CLINIC | Age: 83
End: 2023-05-24
Payer: MEDICARE

## 2023-05-24 ENCOUNTER — OFFICE VISIT (OUTPATIENT)
Dept: UROLOGY | Facility: CLINIC | Age: 83
End: 2023-05-24
Payer: MEDICARE

## 2023-05-24 VITALS — WEIGHT: 170 LBS | BODY MASS INDEX: 21.82 KG/M2 | HEIGHT: 74 IN

## 2023-05-24 DIAGNOSIS — N52.01 ERECTILE DYSFUNCTION DUE TO ARTERIAL INSUFFICIENCY: ICD-10-CM

## 2023-05-24 DIAGNOSIS — N48.89 PENILE PAIN: ICD-10-CM

## 2023-05-24 DIAGNOSIS — N50.82 SCROTUM PAIN: Primary | ICD-10-CM

## 2023-05-24 LAB
BILIRUBIN, UA POC OHS: ABNORMAL
BLOOD, UA POC OHS: NEGATIVE
CLARITY, UA POC OHS: CLEAR
COLOR, UA POC OHS: YELLOW
GLUCOSE, UA POC OHS: 100
KETONES, UA POC OHS: ABNORMAL
LEUKOCYTES, UA POC OHS: NEGATIVE
NITRITE, UA POC OHS: NEGATIVE
PH, UA POC OHS: 5.5
PROTEIN, UA POC OHS: 100
SPECIFIC GRAVITY, UA POC OHS: >=1.03
UROBILINOGEN, UA POC OHS: 1

## 2023-05-24 PROCEDURE — 1101F PT FALLS ASSESS-DOCD LE1/YR: CPT | Mod: CPTII,S$GLB,, | Performed by: UROLOGY

## 2023-05-24 PROCEDURE — 99999 PR PBB SHADOW E&M-EST. PATIENT-LVL III: ICD-10-PCS | Mod: PBBFAC,,, | Performed by: UROLOGY

## 2023-05-24 PROCEDURE — 3288F PR FALLS RISK ASSESSMENT DOCUMENTED: ICD-10-PCS | Mod: CPTII,S$GLB,, | Performed by: UROLOGY

## 2023-05-24 PROCEDURE — 1125F AMNT PAIN NOTED PAIN PRSNT: CPT | Mod: CPTII,S$GLB,, | Performed by: UROLOGY

## 2023-05-24 PROCEDURE — 99214 OFFICE O/P EST MOD 30 MIN: CPT | Mod: S$GLB,,, | Performed by: UROLOGY

## 2023-05-24 PROCEDURE — 81003 POCT URINALYSIS(INSTRUMENT): ICD-10-PCS | Mod: QW,S$GLB,, | Performed by: UROLOGY

## 2023-05-24 PROCEDURE — 3288F FALL RISK ASSESSMENT DOCD: CPT | Mod: CPTII,S$GLB,, | Performed by: UROLOGY

## 2023-05-24 PROCEDURE — 1125F PR PAIN SEVERITY QUANTIFIED, PAIN PRESENT: ICD-10-PCS | Mod: CPTII,S$GLB,, | Performed by: UROLOGY

## 2023-05-24 PROCEDURE — 1101F PR PT FALLS ASSESS DOC 0-1 FALLS W/OUT INJ PAST YR: ICD-10-PCS | Mod: CPTII,S$GLB,, | Performed by: UROLOGY

## 2023-05-24 PROCEDURE — 99214 PR OFFICE/OUTPT VISIT, EST, LEVL IV, 30-39 MIN: ICD-10-PCS | Mod: S$GLB,,, | Performed by: UROLOGY

## 2023-05-24 PROCEDURE — 1159F PR MEDICATION LIST DOCUMENTED IN MEDICAL RECORD: ICD-10-PCS | Mod: CPTII,S$GLB,, | Performed by: UROLOGY

## 2023-05-24 PROCEDURE — 1159F MED LIST DOCD IN RCRD: CPT | Mod: CPTII,S$GLB,, | Performed by: UROLOGY

## 2023-05-24 PROCEDURE — 81003 URINALYSIS AUTO W/O SCOPE: CPT | Mod: QW,S$GLB,, | Performed by: UROLOGY

## 2023-05-24 PROCEDURE — 99999 PR PBB SHADOW E&M-EST. PATIENT-LVL III: CPT | Mod: PBBFAC,,, | Performed by: UROLOGY

## 2023-05-24 NOTE — PROGRESS NOTES
Subjective:       Patient ID: Carolann Waite is a 82 y.o. male.    Chief Complaint: Groin Pain (Scrotum pain )    HPI    82-year-old with severe ED.  He had a penile prosthesis placed in the past which was subsequently removed as was causing him discomfort.  He now complains discomfort in the scrotum.  There is a knot in the scrotum as well where the previous pump was placed.  Scrotal ultrasound was obtained.  The testes are normal in appearance.  Scrotal pain he has a somewhat generalized and is not focused on this lesion in the scrotum.  Urine dipstick shows negative for all components except glucose.    Impression:     Well-circumscribed heterogeneous nodular structure at midline measuring 3.5 x 2.5 x 3.0 cm which is indeterminate.  Per history there is a prosthesis at midline.    Review of Systems   Constitutional:  Negative for fever.   Genitourinary:  Positive for penile pain and scrotal swelling. Negative for dysuria, hematuria and testicular pain.     Objective:      Physical Exam  Vitals reviewed.   Constitutional:       Appearance: He is well-developed.   Pulmonary:      Effort: Pulmonary effort is normal.   Genitourinary:     Penis: Normal.       Testes: Normal.      Epididymis:      Right: Normal.      Left: Normal.   Skin:     Findings: No rash.   Neurological:      Mental Status: He is alert and oriented to person, place, and time.       Assessment:       1. Scrotum pain    2. Erectile dysfunction due to arterial insufficiency    3. Penile pain        Plan:       Scrotum pain  -     POCT Urinalysis(Instrument)    Erectile dysfunction due to arterial insufficiency    Penile pain       I personally reviewed the US images and made an independent interpretation of the test completed by another healthcare professional.  Soft tissue granuloma at the site of his previous prosthesis pump.  There was no pain elicited with palpation of this lesion and removal would likely not improve his symptoms.  I recommend  scrotal support and anti-inflammatories.

## 2023-05-24 NOTE — TELEPHONE ENCOUNTER
----- Message from Kulwinder May sent at 5/24/2023 10:07 AM CDT -----  Contact: pt at [phone]  Type:  Sooner Appointment Request    Caller is requesting a sooner appointment.  Caller declined first available appointment listed below.  Caller will not accept being placed on the waitlist and is requesting a message be sent to doctor.    Name of Caller:  pt  When is the first available appointment?  8/1  Symptoms:  pain in prostate  Best Call Back Number:  [phone]  Additional Information:  pt is calling the office to schedule an appt due to pain in prostate and the date of 8/1 came up he states he needs to be seen sooner than that date.

## 2023-12-14 ENCOUNTER — TELEPHONE (OUTPATIENT)
Dept: GASTROENTEROLOGY | Facility: CLINIC | Age: 83
End: 2023-12-14
Payer: MEDICARE

## 2023-12-14 NOTE — TELEPHONE ENCOUNTER
----- Message from Hawa Fleming LPN sent at 12/14/2023  8:56 AM CST -----  Contact: pt    ----- Message -----  From: Tonya Pak  Sent: 12/14/2023   8:20 AM CST  To: [facility] Clinical Staff    Type:  Sooner Apoointment Request    Caller is requesting a sooner appointment.  Caller declined first available appointment listed below.  Caller will not accept being placed on the waitlist and is requesting a message be sent to doctor.  Name of Caller:pt    When is the first available appointment?department book    Symptoms:colonoscopy recall letter    Would the patient rather a call back or a response via [facility]? Call back    Best Call Back Number:[phone]    Additional Information: please call to schedule  Thanks

## 2023-12-14 NOTE — TELEPHONE ENCOUNTER
I reach out back to patient to schedule colonoscopy.  Patient does not answer. I leave a brief voicemail for pt to return my call. Callback number is provided.      Agustina Garcia, KELLEN     PM    1/29/19  1:05 PM  Note  Pt notified colon polyp benign recommend repeat colon in 5 yrs. Verbs understanding

## 2023-12-18 ENCOUNTER — TELEPHONE (OUTPATIENT)
Dept: GASTROENTEROLOGY | Facility: CLINIC | Age: 83
End: 2023-12-18
Payer: MEDICARE

## 2023-12-18 NOTE — TELEPHONE ENCOUNTER
Pt is aware c-scope isn't required after 77.  C-scope Dx is verified prior to scheduling from pt's Hx. Prep instructions has been sent via [facility] and/or mail. Address is confirmed. Patient is informed the preop Nurse will call him/her with the arrival time a day or two prior to procedure. Patient verbalizes understanding.

## 2024-02-29 ENCOUNTER — TELEPHONE (OUTPATIENT)
Dept: SURGERY | Facility: CLINIC | Age: 84
End: 2024-02-29
Payer: MEDICARE

## 2024-02-29 NOTE — TELEPHONE ENCOUNTER
----- Message from Taty Llamas sent at 2/29/2024  9:08 AM CST -----  Contact: self  Type: Needs Medical Advice  Who Called:  patient   Best Call Back Number: [phone]  Additional Information: patient has a colonoscopy [place] on 3/7/24 that he needs to cancel.  States he has too much going on right now and will call back to kassi when things settle down.  Thank you

## 2025-01-28 PROBLEM — I25.708 CORONARY ARTERY DISEASE OF BYPASS GRAFT OF NATIVE HEART WITH STABLE ANGINA PECTORIS: Status: ACTIVE | Noted: 2023-01-11

## 2025-06-03 PROBLEM — K40.90 RIGHT INGUINAL HERNIA: Status: ACTIVE | Noted: 2025-06-03

## 2025-08-12 ENCOUNTER — OFFICE VISIT (OUTPATIENT)
Dept: PODIATRY | Facility: CLINIC | Age: 85
End: 2025-08-12
Payer: MEDICARE

## 2025-08-12 ENCOUNTER — HOSPITAL ENCOUNTER (OUTPATIENT)
Dept: RADIOLOGY | Facility: HOSPITAL | Age: 85
Discharge: HOME | End: 2025-08-12
Payer: MEDICARE

## 2025-08-12 VITALS — WEIGHT: 168 LBS | HEIGHT: 74 IN | BODY MASS INDEX: 21.56 KG/M2

## 2025-08-12 DIAGNOSIS — L97.512 TOE ULCER, RIGHT, WITH FAT LAYER EXPOSED: ICD-10-CM

## 2025-08-12 DIAGNOSIS — E11.49 TYPE II DIABETES MELLITUS WITH NEUROLOGICAL MANIFESTATIONS: Primary | ICD-10-CM

## 2025-08-12 PROCEDURE — 99999 PR PBB SHADOW E&M-EST. PATIENT-LVL III: CPT | Mod: PBBFAC,,,

## 2025-08-12 PROCEDURE — 73630 X-RAY EXAM OF FOOT: CPT | Mod: 26,RT,, | Performed by: RADIOLOGY

## 2025-08-12 PROCEDURE — 73630 X-RAY EXAM OF FOOT: CPT | Mod: TC,PO,RT

## 2025-08-19 ENCOUNTER — OFFICE VISIT (OUTPATIENT)
Dept: PODIATRY | Facility: CLINIC | Age: 85
End: 2025-08-19
Payer: MEDICARE

## 2025-08-19 VITALS — HEIGHT: 74 IN | WEIGHT: 168 LBS | BODY MASS INDEX: 21.56 KG/M2

## 2025-08-19 DIAGNOSIS — E11.49 TYPE II DIABETES MELLITUS WITH NEUROLOGICAL MANIFESTATIONS: ICD-10-CM

## 2025-08-19 DIAGNOSIS — L97.512 TOE ULCER, RIGHT, WITH FAT LAYER EXPOSED: Primary | ICD-10-CM

## 2025-08-19 PROCEDURE — 1160F RVW MEDS BY RX/DR IN RCRD: CPT | Mod: CPTII,S$GLB,,

## 2025-08-19 PROCEDURE — 99213 OFFICE O/P EST LOW 20 MIN: CPT | Mod: S$GLB,,,

## 2025-08-19 PROCEDURE — 1101F PT FALLS ASSESS-DOCD LE1/YR: CPT | Mod: CPTII,S$GLB,,

## 2025-08-19 PROCEDURE — 3288F FALL RISK ASSESSMENT DOCD: CPT | Mod: CPTII,S$GLB,,

## 2025-08-19 PROCEDURE — 1159F MED LIST DOCD IN RCRD: CPT | Mod: CPTII,S$GLB,,

## 2025-08-19 PROCEDURE — 99999 PR PBB SHADOW E&M-EST. PATIENT-LVL III: CPT | Mod: PBBFAC,,,

## (undated) DEVICE — RETRACTOR LONE STAR 28.3X18.3

## (undated) DEVICE — APPLICATOR CHLORAPREP CLR 10.5

## (undated) DEVICE — GAUZE FLUFF XXLG 36X36 2 PLY

## (undated) DEVICE — SUT 2-0 VICRYL / SH (J417)

## (undated) DEVICE — TRAY NERVE BLOCK

## (undated) DEVICE — BOWL STERILE LARGE 32OZ

## (undated) DEVICE — PITCHER VAGINAL 32OZ

## (undated) DEVICE — GLOVE SURG BIOGEL LATEX SZ 7.5

## (undated) DEVICE — LINER GLOVE POWDERFREE 8

## (undated) DEVICE — Device

## (undated) DEVICE — RETRACTOR STAY SPIRA  20MM

## (undated) DEVICE — SUT ETHILON 2-0 PSLX 30IN

## (undated) DEVICE — BNDG COFLEX FOAM LF2 ST 4X5YD

## (undated) DEVICE — DRESSING XEROFORM FOIL PK 1X8

## (undated) DEVICE — DRAPE STERI INSTRUMENT 1018

## (undated) DEVICE — RETRACTOR DEEP SCROTAL INF LF

## (undated) DEVICE — SEE MEDLINE ITEM 154981

## (undated) DEVICE — BLADE SURG #15 CARBON STEEL

## (undated) DEVICE — TRAY SKIN SCRUB WET PREMIUM

## (undated) DEVICE — PANTIES FEMININE NAPKIN LG/XLG

## (undated) DEVICE — DRAPE INCISE IOBAN 2 23X17IN

## (undated) DEVICE — SUT VICRYL 3-0 27 SH

## (undated) DEVICE — SYR LUER LOCK 1CC

## (undated) DEVICE — SYR 10CC LUER LOCK

## (undated) DEVICE — TIP YANKAUERS BULB NO VENT

## (undated) DEVICE — GLOVE SURGICAL LATEX SZ 7

## (undated) DEVICE — MARKER SKIN STND TIP BLUE BARR

## (undated) DEVICE — PACK SCROTO-PAK LONE STAR

## (undated) DEVICE — GAUZE SPONGE PEANUT STRL

## (undated) DEVICE — CUP MEDICINE GRADUATED 1OZ

## (undated) DEVICE — SEE MEDLINE ITEM 157148

## (undated) DEVICE — CLOSURE SKIN STERI STRIP 1/2X4

## (undated) DEVICE — TRAY MINOR GEN SURG

## (undated) DEVICE — DRAPE LAP T SHT W/ INSTR PAD

## (undated) DEVICE — SYR 30CC LUER LOCK

## (undated) DEVICE — SUT CTD VICRYL VIL BR SH 27

## (undated) DEVICE — GAUZE SPONGE 4X4 12PLY

## (undated) DEVICE — NDL TUOHY EPIDURAL 20G X 3.5

## (undated) DEVICE — KIT COLLECTION E SWAB REGULAR

## (undated) DEVICE — LUBRICANT SURGILUBE 2 OZ

## (undated) DEVICE — SEE L#95700

## (undated) DEVICE — SET DECANTER MEDICHOICE

## (undated) DEVICE — NDL SPINAL SPINOCAN 22GX3.5

## (undated) DEVICE — CASSETTE DRAPE

## (undated) DEVICE — SYR 50CC LL

## (undated) DEVICE — SPONGE DERMACEA GAUZE 4X4

## (undated) DEVICE — DISCONTINUED HS CLEANUP

## (undated) DEVICE — CLIPPER BLADE MOD 4406 (CAREF)

## (undated) DEVICE — SEE MEDLINE ITEM 152530

## (undated) DEVICE — SYR GLASS 5CC LUER LOK

## (undated) DEVICE — SYR 1CC TB SG 27GX1/2

## (undated) DEVICE — ELECTRODE REM PLYHSV RETURN 9

## (undated) DEVICE — BRIEF MESH LARGE

## (undated) DEVICE — SOL NS 1000CC

## (undated) DEVICE — ADHESIVE DERMABOND ADVANCED

## (undated) DEVICE — SEE MEDLINE ITEM 152622

## (undated) DEVICE — SEE MEDLINE ITEM 157206

## (undated) DEVICE — KIT IRR SUCTION HND PIECE

## (undated) DEVICE — SET BLD COLL SAFETY 21G X 3/4

## (undated) DEVICE — CORD BIPOLAR 12 FOOT

## (undated) DEVICE — SUT CHROMIC 3-0 SH 27IN GUT

## (undated) DEVICE — WRAP COBAN ELASTIC 3X5YD NS

## (undated) DEVICE — TRAY CATH FOL SIL URIMTR 16FR

## (undated) DEVICE — FORCEP STRAIGHT DISP

## (undated) DEVICE — SYR IRRIGATION BULB STER 60ML

## (undated) DEVICE — TRAY MINOR GEN SURG OMC

## (undated) DEVICE — SPONGE KERLIX ANTIMIC 6X6.75IN

## (undated) DEVICE — SOL STERILE WATER 10ML

## (undated) DEVICE — GOWN SURGICAL X-LARGE

## (undated) DEVICE — SUT VICRYL 4-0 RB1 27IN UD

## (undated) DEVICE — TRAY FOLEY 16FR INFECTION CONT

## (undated) DEVICE — NDL HYPO REG 25G X 1 1/2